# Patient Record
Sex: FEMALE | Race: WHITE | NOT HISPANIC OR LATINO | Employment: OTHER | URBAN - METROPOLITAN AREA
[De-identification: names, ages, dates, MRNs, and addresses within clinical notes are randomized per-mention and may not be internally consistent; named-entity substitution may affect disease eponyms.]

---

## 2017-01-23 ENCOUNTER — OFFICE VISIT - HEALTHEAST (OUTPATIENT)
Dept: FAMILY MEDICINE | Facility: CLINIC | Age: 72
End: 2017-01-23

## 2017-01-23 DIAGNOSIS — J20.9 ACUTE BRONCHITIS: ICD-10-CM

## 2017-01-23 DIAGNOSIS — E03.9 UNSPECIFIED HYPOTHYROIDISM: ICD-10-CM

## 2017-01-23 DIAGNOSIS — J01.90 ACUTE SINUSITIS: ICD-10-CM

## 2018-01-16 ENCOUNTER — HOSPITAL ENCOUNTER (OUTPATIENT)
Dept: MAMMOGRAPHY | Facility: HOSPITAL | Age: 73
Discharge: HOME | End: 2018-01-16
Attending: FAMILY MEDICINE

## 2018-01-16 DIAGNOSIS — Z12.31 VISIT FOR SCREENING MAMMOGRAM: ICD-10-CM

## 2018-01-29 ENCOUNTER — COMMUNICATION - HEALTHEAST (OUTPATIENT)
Dept: FAMILY MEDICINE | Facility: CLINIC | Age: 73
End: 2018-01-29

## 2018-01-29 DIAGNOSIS — E03.9 HYPOTHYROIDISM, UNSPECIFIED TYPE: ICD-10-CM

## 2018-01-29 DIAGNOSIS — Z00.00 HEALTH CARE MAINTENANCE: ICD-10-CM

## 2018-02-02 ENCOUNTER — AMBULATORY - HEALTHEAST (OUTPATIENT)
Dept: LAB | Facility: CLINIC | Age: 73
End: 2018-02-02

## 2018-02-02 DIAGNOSIS — Z00.00 HEALTH CARE MAINTENANCE: ICD-10-CM

## 2018-02-02 DIAGNOSIS — E03.9 HYPOTHYROIDISM, UNSPECIFIED TYPE: ICD-10-CM

## 2018-02-02 LAB
ALBUMIN SERPL-MCNC: 3.8 G/DL (ref 3.5–5)
ALP SERPL-CCNC: 67 U/L (ref 45–120)
ALT SERPL W P-5'-P-CCNC: 17 U/L (ref 0–45)
ANION GAP SERPL CALCULATED.3IONS-SCNC: 8 MMOL/L (ref 5–18)
AST SERPL W P-5'-P-CCNC: 21 U/L (ref 0–40)
BILIRUB SERPL-MCNC: 0.9 MG/DL (ref 0–1)
BUN SERPL-MCNC: 13 MG/DL (ref 8–28)
CALCIUM SERPL-MCNC: 10.9 MG/DL (ref 8.5–10.5)
CHLORIDE BLD-SCNC: 105 MMOL/L (ref 98–107)
CHOLEST SERPL-MCNC: 212 MG/DL
CO2 SERPL-SCNC: 30 MMOL/L (ref 22–31)
CREAT SERPL-MCNC: 0.7 MG/DL (ref 0.6–1.1)
FASTING STATUS PATIENT QL REPORTED: YES
GFR SERPL CREATININE-BSD FRML MDRD: >60 ML/MIN/1.73M2
GLUCOSE BLD-MCNC: 99 MG/DL (ref 70–125)
HDLC SERPL-MCNC: 64 MG/DL
LDLC SERPL CALC-MCNC: 129 MG/DL
POTASSIUM BLD-SCNC: 4.4 MMOL/L (ref 3.5–5)
PROT SERPL-MCNC: 7 G/DL (ref 6–8)
SODIUM SERPL-SCNC: 143 MMOL/L (ref 136–145)
T4 FREE SERPL-MCNC: 1.2 NG/DL (ref 0.7–1.8)
TRIGL SERPL-MCNC: 93 MG/DL
TSH SERPL DL<=0.005 MIU/L-ACNC: 6.45 UIU/ML (ref 0.3–5)

## 2018-02-05 ENCOUNTER — OFFICE VISIT - HEALTHEAST (OUTPATIENT)
Dept: FAMILY MEDICINE | Facility: CLINIC | Age: 73
End: 2018-02-05

## 2018-02-05 DIAGNOSIS — E03.9 HYPOTHYROIDISM: ICD-10-CM

## 2018-02-05 DIAGNOSIS — I10 HYPERTENSION: ICD-10-CM

## 2018-02-07 ENCOUNTER — AMBULATORY - HEALTHEAST (OUTPATIENT)
Dept: FAMILY MEDICINE | Facility: CLINIC | Age: 73
End: 2018-02-07

## 2018-02-07 DIAGNOSIS — E03.9 HYPOTHYROIDISM: ICD-10-CM

## 2018-02-26 ENCOUNTER — AMBULATORY - HEALTHEAST (OUTPATIENT)
Dept: FAMILY MEDICINE | Facility: CLINIC | Age: 73
End: 2018-02-26

## 2018-02-26 ENCOUNTER — COMMUNICATION - HEALTHEAST (OUTPATIENT)
Dept: FAMILY MEDICINE | Facility: CLINIC | Age: 73
End: 2018-02-26

## 2018-02-26 DIAGNOSIS — E03.9 HYPOTHYROIDISM, UNSPECIFIED TYPE: ICD-10-CM

## 2018-03-01 ENCOUNTER — AMBULATORY - HEALTHEAST (OUTPATIENT)
Dept: FAMILY MEDICINE | Facility: CLINIC | Age: 73
End: 2018-03-01

## 2018-03-19 ENCOUNTER — OFFICE VISIT - HEALTHEAST (OUTPATIENT)
Dept: FAMILY MEDICINE | Facility: CLINIC | Age: 73
End: 2018-03-19

## 2018-03-19 DIAGNOSIS — E03.9 HYPOTHYROIDISM, UNSPECIFIED TYPE: ICD-10-CM

## 2018-03-19 DIAGNOSIS — E78.5 HYPERLIPIDEMIA: ICD-10-CM

## 2018-03-19 DIAGNOSIS — I10 HYPERTENSION: ICD-10-CM

## 2018-03-19 LAB
T4 FREE SERPL-MCNC: 1 NG/DL (ref 0.7–1.8)
TSH SERPL DL<=0.005 MIU/L-ACNC: 5.22 UIU/ML (ref 0.3–5)

## 2018-05-03 ENCOUNTER — COMMUNICATION - HEALTHEAST (OUTPATIENT)
Dept: FAMILY MEDICINE | Facility: CLINIC | Age: 73
End: 2018-05-03

## 2018-05-03 DIAGNOSIS — I10 HYPERTENSION: ICD-10-CM

## 2018-05-11 ENCOUNTER — COMMUNICATION - HEALTHEAST (OUTPATIENT)
Dept: FAMILY MEDICINE | Facility: CLINIC | Age: 73
End: 2018-05-11

## 2018-05-17 ENCOUNTER — COMMUNICATION - HEALTHEAST (OUTPATIENT)
Dept: FAMILY MEDICINE | Facility: CLINIC | Age: 73
End: 2018-05-17

## 2018-10-15 ENCOUNTER — RECORDS - HEALTHEAST (OUTPATIENT)
Dept: ADMINISTRATIVE | Facility: OTHER | Age: 73
End: 2018-10-15

## 2019-01-07 ENCOUNTER — COMMUNICATION - HEALTHEAST (OUTPATIENT)
Dept: FAMILY MEDICINE | Facility: CLINIC | Age: 74
End: 2019-01-07

## 2019-01-16 ENCOUNTER — OFFICE VISIT - HEALTHEAST (OUTPATIENT)
Dept: FAMILY MEDICINE | Facility: CLINIC | Age: 74
End: 2019-01-16

## 2019-01-16 DIAGNOSIS — E03.9 HYPOTHYROIDISM, UNSPECIFIED TYPE: ICD-10-CM

## 2019-01-16 DIAGNOSIS — I10 ESSENTIAL HYPERTENSION: ICD-10-CM

## 2019-01-16 DIAGNOSIS — Z00.00 ROUTINE GENERAL MEDICAL EXAMINATION AT A HEALTH CARE FACILITY: ICD-10-CM

## 2019-01-16 LAB
ALBUMIN SERPL-MCNC: 3.9 G/DL (ref 3.5–5)
ALP SERPL-CCNC: 55 U/L (ref 45–120)
ALT SERPL W P-5'-P-CCNC: 16 U/L (ref 0–45)
ANION GAP SERPL CALCULATED.3IONS-SCNC: 11 MMOL/L (ref 5–18)
AST SERPL W P-5'-P-CCNC: 23 U/L (ref 0–40)
BILIRUB SERPL-MCNC: 0.8 MG/DL (ref 0–1)
BUN SERPL-MCNC: 18 MG/DL (ref 8–28)
CALCIUM SERPL-MCNC: 10.6 MG/DL (ref 8.5–10.5)
CHLORIDE BLD-SCNC: 103 MMOL/L (ref 98–107)
CO2 SERPL-SCNC: 27 MMOL/L (ref 22–31)
CREAT SERPL-MCNC: 0.69 MG/DL (ref 0.6–1.1)
ERYTHROCYTE [DISTWIDTH] IN BLOOD BY AUTOMATED COUNT: 12.3 % (ref 11–14.5)
GFR SERPL CREATININE-BSD FRML MDRD: >60 ML/MIN/1.73M2
GLUCOSE BLD-MCNC: 95 MG/DL (ref 70–125)
HCT VFR BLD AUTO: 43.9 % (ref 35–47)
HGB BLD-MCNC: 14.5 G/DL (ref 12–16)
MCH RBC QN AUTO: 30.6 PG (ref 27–34)
MCHC RBC AUTO-ENTMCNC: 33.1 G/DL (ref 32–36)
MCV RBC AUTO: 92 FL (ref 80–100)
PLATELET # BLD AUTO: 186 THOU/UL (ref 140–440)
PMV BLD AUTO: 9.2 FL (ref 7–10)
POTASSIUM BLD-SCNC: 4.2 MMOL/L (ref 3.5–5)
PROT SERPL-MCNC: 6.9 G/DL (ref 6–8)
RBC # BLD AUTO: 4.74 MILL/UL (ref 3.8–5.4)
SODIUM SERPL-SCNC: 141 MMOL/L (ref 136–145)
TSH SERPL DL<=0.005 MIU/L-ACNC: 3.76 UIU/ML (ref 0.3–5)
WBC: 6 THOU/UL (ref 4–11)

## 2019-01-16 ASSESSMENT — MIFFLIN-ST. JEOR: SCORE: 1304.62

## 2019-03-21 ENCOUNTER — COMMUNICATION - HEALTHEAST (OUTPATIENT)
Dept: FAMILY MEDICINE | Facility: CLINIC | Age: 74
End: 2019-03-21

## 2019-04-10 ENCOUNTER — AMBULATORY - HEALTHEAST (OUTPATIENT)
Dept: NURSING | Facility: CLINIC | Age: 74
End: 2019-04-10

## 2019-05-10 ENCOUNTER — COMMUNICATION - HEALTHEAST (OUTPATIENT)
Dept: FAMILY MEDICINE | Facility: CLINIC | Age: 74
End: 2019-05-10

## 2019-05-10 ENCOUNTER — COMMUNICATION - HEALTHEAST (OUTPATIENT)
Dept: SCHEDULING | Facility: CLINIC | Age: 74
End: 2019-05-10

## 2019-05-14 ENCOUNTER — OFFICE VISIT - HEALTHEAST (OUTPATIENT)
Dept: FAMILY MEDICINE | Facility: CLINIC | Age: 74
End: 2019-05-14

## 2019-05-14 DIAGNOSIS — M54.6 CHRONIC RIGHT-SIDED THORACIC BACK PAIN: ICD-10-CM

## 2019-05-14 DIAGNOSIS — G89.29 CHRONIC RIGHT-SIDED THORACIC BACK PAIN: ICD-10-CM

## 2019-05-28 ENCOUNTER — RECORDS - HEALTHEAST (OUTPATIENT)
Dept: GENERAL RADIOLOGY | Facility: CLINIC | Age: 74
End: 2019-05-28

## 2019-05-28 ENCOUNTER — OFFICE VISIT - HEALTHEAST (OUTPATIENT)
Dept: FAMILY MEDICINE | Facility: CLINIC | Age: 74
End: 2019-05-28

## 2019-05-28 DIAGNOSIS — Z78.0 POST-MENOPAUSAL: ICD-10-CM

## 2019-05-28 DIAGNOSIS — E03.9 HYPOTHYROIDISM, UNSPECIFIED TYPE: ICD-10-CM

## 2019-05-28 DIAGNOSIS — M54.6 RIGHT-SIDED THORACIC BACK PAIN, UNSPECIFIED CHRONICITY: ICD-10-CM

## 2019-05-28 DIAGNOSIS — M54.6 PAIN IN THORACIC SPINE: ICD-10-CM

## 2019-05-28 DIAGNOSIS — I10 ESSENTIAL HYPERTENSION: ICD-10-CM

## 2019-05-29 ENCOUNTER — AMBULATORY - HEALTHEAST (OUTPATIENT)
Dept: SCHEDULING | Facility: CLINIC | Age: 74
End: 2019-05-29

## 2019-05-29 DIAGNOSIS — Z78.0 POST-MENOPAUSAL: ICD-10-CM

## 2019-06-11 ENCOUNTER — OFFICE VISIT - HEALTHEAST (OUTPATIENT)
Dept: PHYSICAL THERAPY | Facility: REHABILITATION | Age: 74
End: 2019-06-11

## 2019-06-11 DIAGNOSIS — M54.6 PAIN IN THORACIC SPINE: ICD-10-CM

## 2019-06-11 DIAGNOSIS — M62.81 GENERALIZED MUSCLE WEAKNESS: ICD-10-CM

## 2019-06-11 DIAGNOSIS — R29.3 POOR POSTURE: ICD-10-CM

## 2019-06-13 ENCOUNTER — OFFICE VISIT - HEALTHEAST (OUTPATIENT)
Dept: PHYSICAL THERAPY | Facility: REHABILITATION | Age: 74
End: 2019-06-13

## 2019-06-13 DIAGNOSIS — M62.81 GENERALIZED MUSCLE WEAKNESS: ICD-10-CM

## 2019-06-13 DIAGNOSIS — M54.6 PAIN IN THORACIC SPINE: ICD-10-CM

## 2019-06-13 DIAGNOSIS — R29.3 POOR POSTURE: ICD-10-CM

## 2019-06-19 ENCOUNTER — OFFICE VISIT - HEALTHEAST (OUTPATIENT)
Dept: PHYSICAL THERAPY | Facility: REHABILITATION | Age: 74
End: 2019-06-19

## 2019-06-19 DIAGNOSIS — I10 ESSENTIAL HYPERTENSION: ICD-10-CM

## 2019-06-19 DIAGNOSIS — M62.81 GENERALIZED MUSCLE WEAKNESS: ICD-10-CM

## 2019-06-19 DIAGNOSIS — R29.3 POOR POSTURE: ICD-10-CM

## 2019-06-19 DIAGNOSIS — M54.6 PAIN IN THORACIC SPINE: ICD-10-CM

## 2019-06-26 ENCOUNTER — OFFICE VISIT - HEALTHEAST (OUTPATIENT)
Dept: PHYSICAL THERAPY | Facility: REHABILITATION | Age: 74
End: 2019-06-26

## 2019-06-26 DIAGNOSIS — M54.6 PAIN IN THORACIC SPINE: ICD-10-CM

## 2019-06-26 DIAGNOSIS — R29.3 POOR POSTURE: ICD-10-CM

## 2019-06-26 DIAGNOSIS — M62.81 GENERALIZED MUSCLE WEAKNESS: ICD-10-CM

## 2019-07-02 ENCOUNTER — OFFICE VISIT - HEALTHEAST (OUTPATIENT)
Dept: PHYSICAL THERAPY | Facility: REHABILITATION | Age: 74
End: 2019-07-02

## 2019-07-02 DIAGNOSIS — M54.6 PAIN IN THORACIC SPINE: ICD-10-CM

## 2019-07-02 DIAGNOSIS — R29.3 POOR POSTURE: ICD-10-CM

## 2019-07-02 DIAGNOSIS — M62.81 GENERALIZED MUSCLE WEAKNESS: ICD-10-CM

## 2019-07-10 ENCOUNTER — OFFICE VISIT - HEALTHEAST (OUTPATIENT)
Dept: PHYSICAL THERAPY | Facility: REHABILITATION | Age: 74
End: 2019-07-10

## 2019-07-10 DIAGNOSIS — M54.6 PAIN IN THORACIC SPINE: ICD-10-CM

## 2019-07-10 DIAGNOSIS — M62.81 GENERALIZED MUSCLE WEAKNESS: ICD-10-CM

## 2019-07-10 DIAGNOSIS — I10 ESSENTIAL HYPERTENSION: ICD-10-CM

## 2019-07-10 DIAGNOSIS — R29.3 POOR POSTURE: ICD-10-CM

## 2019-07-18 ENCOUNTER — OFFICE VISIT - HEALTHEAST (OUTPATIENT)
Dept: PHYSICAL THERAPY | Facility: REHABILITATION | Age: 74
End: 2019-07-18

## 2019-07-18 DIAGNOSIS — M62.81 GENERALIZED MUSCLE WEAKNESS: ICD-10-CM

## 2019-07-18 DIAGNOSIS — I10 ESSENTIAL HYPERTENSION: ICD-10-CM

## 2019-07-18 DIAGNOSIS — R29.3 POOR POSTURE: ICD-10-CM

## 2019-07-18 DIAGNOSIS — M54.6 PAIN IN THORACIC SPINE: ICD-10-CM

## 2019-07-24 ENCOUNTER — OFFICE VISIT - HEALTHEAST (OUTPATIENT)
Dept: PHYSICAL THERAPY | Facility: REHABILITATION | Age: 74
End: 2019-07-24

## 2019-07-24 DIAGNOSIS — M62.81 GENERALIZED MUSCLE WEAKNESS: ICD-10-CM

## 2019-07-24 DIAGNOSIS — R29.3 POOR POSTURE: ICD-10-CM

## 2019-07-24 DIAGNOSIS — M54.6 PAIN IN THORACIC SPINE: ICD-10-CM

## 2019-07-24 DIAGNOSIS — I10 ESSENTIAL HYPERTENSION: ICD-10-CM

## 2019-07-29 ENCOUNTER — OFFICE VISIT - HEALTHEAST (OUTPATIENT)
Dept: PHYSICAL THERAPY | Facility: REHABILITATION | Age: 74
End: 2019-07-29

## 2019-07-29 DIAGNOSIS — R29.3 POOR POSTURE: ICD-10-CM

## 2019-07-29 DIAGNOSIS — M54.6 PAIN IN THORACIC SPINE: ICD-10-CM

## 2019-07-29 DIAGNOSIS — M62.81 GENERALIZED MUSCLE WEAKNESS: ICD-10-CM

## 2019-08-07 ENCOUNTER — OFFICE VISIT - HEALTHEAST (OUTPATIENT)
Dept: PHYSICAL THERAPY | Facility: REHABILITATION | Age: 74
End: 2019-08-07

## 2019-08-07 DIAGNOSIS — M62.81 GENERALIZED MUSCLE WEAKNESS: ICD-10-CM

## 2019-08-07 DIAGNOSIS — M54.6 PAIN IN THORACIC SPINE: ICD-10-CM

## 2019-08-07 DIAGNOSIS — R29.3 POOR POSTURE: ICD-10-CM

## 2019-08-28 ENCOUNTER — OFFICE VISIT - HEALTHEAST (OUTPATIENT)
Dept: PHYSICAL THERAPY | Facility: REHABILITATION | Age: 74
End: 2019-08-28

## 2019-08-28 DIAGNOSIS — M54.6 PAIN IN THORACIC SPINE: ICD-10-CM

## 2019-08-28 DIAGNOSIS — M62.81 GENERALIZED MUSCLE WEAKNESS: ICD-10-CM

## 2019-08-28 DIAGNOSIS — R29.3 POOR POSTURE: ICD-10-CM

## 2019-08-29 ENCOUNTER — OFFICE VISIT - HEALTHEAST (OUTPATIENT)
Dept: FAMILY MEDICINE | Facility: CLINIC | Age: 74
End: 2019-08-29

## 2019-08-29 DIAGNOSIS — H26.9 CATARACT OF BOTH EYES, UNSPECIFIED CATARACT TYPE: ICD-10-CM

## 2019-08-29 DIAGNOSIS — M54.6 PAIN IN THORACIC SPINE: ICD-10-CM

## 2019-08-29 DIAGNOSIS — Z01.818 PREOP GENERAL PHYSICAL EXAM: ICD-10-CM

## 2019-08-29 ASSESSMENT — MIFFLIN-ST. JEOR: SCORE: 1338.64

## 2019-09-18 ENCOUNTER — OFFICE VISIT - HEALTHEAST (OUTPATIENT)
Dept: PHYSICAL THERAPY | Facility: REHABILITATION | Age: 74
End: 2019-09-18

## 2019-09-18 DIAGNOSIS — R29.3 POOR POSTURE: ICD-10-CM

## 2019-09-18 DIAGNOSIS — M54.6 PAIN IN THORACIC SPINE: ICD-10-CM

## 2019-09-18 DIAGNOSIS — M62.81 GENERALIZED MUSCLE WEAKNESS: ICD-10-CM

## 2019-10-09 ENCOUNTER — OFFICE VISIT - HEALTHEAST (OUTPATIENT)
Dept: PHYSICAL THERAPY | Facility: REHABILITATION | Age: 74
End: 2019-10-09

## 2019-10-09 DIAGNOSIS — R29.3 POOR POSTURE: ICD-10-CM

## 2019-10-09 DIAGNOSIS — M54.6 PAIN IN THORACIC SPINE: ICD-10-CM

## 2019-10-09 DIAGNOSIS — M62.81 GENERALIZED MUSCLE WEAKNESS: ICD-10-CM

## 2020-01-22 ENCOUNTER — COMMUNICATION - HEALTHEAST (OUTPATIENT)
Dept: FAMILY MEDICINE | Facility: CLINIC | Age: 75
End: 2020-01-22

## 2020-01-22 DIAGNOSIS — E03.9 HYPOTHYROIDISM, UNSPECIFIED TYPE: ICD-10-CM

## 2020-01-22 DIAGNOSIS — I10 ESSENTIAL HYPERTENSION: ICD-10-CM

## 2020-03-09 ENCOUNTER — OFFICE VISIT - HEALTHEAST (OUTPATIENT)
Dept: FAMILY MEDICINE | Facility: CLINIC | Age: 75
End: 2020-03-09

## 2020-03-09 DIAGNOSIS — E03.9 HYPOTHYROIDISM, UNSPECIFIED TYPE: ICD-10-CM

## 2020-03-09 DIAGNOSIS — E83.52 HYPERCALCEMIA: ICD-10-CM

## 2020-03-09 DIAGNOSIS — Z12.11 SCREEN FOR COLON CANCER: ICD-10-CM

## 2020-03-09 DIAGNOSIS — Z00.00 ROUTINE GENERAL MEDICAL EXAMINATION AT A HEALTH CARE FACILITY: ICD-10-CM

## 2020-03-09 DIAGNOSIS — I10 ESSENTIAL HYPERTENSION: ICD-10-CM

## 2020-03-09 LAB
ALBUMIN SERPL-MCNC: 4.2 G/DL (ref 3.5–5)
ALP SERPL-CCNC: 59 U/L (ref 45–120)
ALT SERPL W P-5'-P-CCNC: 20 U/L (ref 0–45)
ANION GAP SERPL CALCULATED.3IONS-SCNC: 9 MMOL/L (ref 5–18)
AST SERPL W P-5'-P-CCNC: 27 U/L (ref 0–40)
BILIRUB SERPL-MCNC: 0.8 MG/DL (ref 0–1)
BUN SERPL-MCNC: 14 MG/DL (ref 8–28)
CALCIUM SERPL-MCNC: 11.2 MG/DL (ref 8.5–10.5)
CHLORIDE BLD-SCNC: 102 MMOL/L (ref 98–107)
CHOLEST SERPL-MCNC: 220 MG/DL
CO2 SERPL-SCNC: 29 MMOL/L (ref 22–31)
CREAT SERPL-MCNC: 0.68 MG/DL (ref 0.6–1.1)
FASTING STATUS PATIENT QL REPORTED: YES
GFR SERPL CREATININE-BSD FRML MDRD: >60 ML/MIN/1.73M2
GLUCOSE BLD-MCNC: 97 MG/DL (ref 70–125)
HDLC SERPL-MCNC: 71 MG/DL
LDLC SERPL CALC-MCNC: 137 MG/DL
POTASSIUM BLD-SCNC: 4 MMOL/L (ref 3.5–5)
PROT SERPL-MCNC: 7.3 G/DL (ref 6–8)
PTH-INTACT SERPL-MCNC: 91 PG/ML (ref 10–86)
SODIUM SERPL-SCNC: 140 MMOL/L (ref 136–145)
TRIGL SERPL-MCNC: 58 MG/DL
TSH SERPL DL<=0.005 MIU/L-ACNC: 4.63 UIU/ML (ref 0.3–5)

## 2020-03-09 ASSESSMENT — ANXIETY QUESTIONNAIRES
2. NOT BEING ABLE TO STOP OR CONTROL WORRYING: NOT AT ALL
5. BEING SO RESTLESS THAT IT IS HARD TO SIT STILL: NOT AT ALL
1. FEELING NERVOUS, ANXIOUS, OR ON EDGE: NOT AT ALL
6. BECOMING EASILY ANNOYED OR IRRITABLE: NOT AT ALL
4. TROUBLE RELAXING: NOT AT ALL
GAD7 TOTAL SCORE: 0
7. FEELING AFRAID AS IF SOMETHING AWFUL MIGHT HAPPEN: NOT AT ALL
3. WORRYING TOO MUCH ABOUT DIFFERENT THINGS: NOT AT ALL

## 2020-03-09 ASSESSMENT — MIFFLIN-ST. JEOR: SCORE: 1318.82

## 2020-03-09 ASSESSMENT — PATIENT HEALTH QUESTIONNAIRE - PHQ9: SUM OF ALL RESPONSES TO PHQ QUESTIONS 1-9: 0

## 2020-03-10 LAB
25(OH)D3 SERPL-MCNC: 50.9 NG/ML (ref 30–80)
25(OH)D3 SERPL-MCNC: 50.9 NG/ML (ref 30–80)

## 2020-03-12 ENCOUNTER — COMMUNICATION - HEALTHEAST (OUTPATIENT)
Dept: ADMINISTRATIVE | Facility: CLINIC | Age: 75
End: 2020-03-12

## 2020-03-19 ENCOUNTER — TRANSCRIBE ORDERS (OUTPATIENT)
Dept: OTHER | Age: 75
End: 2020-03-19

## 2020-03-19 DIAGNOSIS — E83.52 HYPERCALCEMIA: Primary | ICD-10-CM

## 2020-03-20 ENCOUNTER — TELEPHONE (OUTPATIENT)
Dept: ENDOCRINOLOGY | Facility: CLINIC | Age: 75
End: 2020-03-20

## 2020-03-20 NOTE — TELEPHONE ENCOUNTER
To schedulers: Please schedule  for new endocrine virtual visit within 28 days.     Kami Chang MD  Endocrine triage

## 2020-03-27 NOTE — TELEPHONE ENCOUNTER
Scheduled pt as telephone visit 3/30. Informed patient that it may change to virtual visit if our capabilities are up and running by then.

## 2020-03-30 PROBLEM — E83.52 HYPERCALCEMIA: Status: ACTIVE | Noted: 2020-03-30

## 2020-04-01 ENCOUNTER — COMMUNICATION - HEALTHEAST (OUTPATIENT)
Dept: FAMILY MEDICINE | Facility: CLINIC | Age: 75
End: 2020-04-01

## 2020-04-20 ENCOUNTER — COMMUNICATION - HEALTHEAST (OUTPATIENT)
Dept: FAMILY MEDICINE | Facility: CLINIC | Age: 75
End: 2020-04-20

## 2020-04-20 DIAGNOSIS — E03.9 HYPOTHYROIDISM, UNSPECIFIED TYPE: ICD-10-CM

## 2020-07-13 ENCOUNTER — COMMUNICATION - HEALTHEAST (OUTPATIENT)
Dept: FAMILY MEDICINE | Facility: CLINIC | Age: 75
End: 2020-07-13

## 2020-07-13 DIAGNOSIS — E03.9 HYPOTHYROIDISM, UNSPECIFIED TYPE: ICD-10-CM

## 2020-09-13 ENCOUNTER — COMMUNICATION - HEALTHEAST (OUTPATIENT)
Dept: FAMILY MEDICINE | Facility: CLINIC | Age: 75
End: 2020-09-13

## 2020-09-21 ENCOUNTER — OFFICE VISIT - HEALTHEAST (OUTPATIENT)
Dept: FAMILY MEDICINE | Facility: CLINIC | Age: 75
End: 2020-09-21

## 2020-09-21 DIAGNOSIS — I10 ESSENTIAL HYPERTENSION: ICD-10-CM

## 2020-09-21 DIAGNOSIS — E03.9 HYPOTHYROIDISM, UNSPECIFIED TYPE: ICD-10-CM

## 2020-09-21 DIAGNOSIS — Z23 NEED FOR INFLUENZA VACCINATION: ICD-10-CM

## 2020-09-21 DIAGNOSIS — E78.2 MIXED HYPERLIPIDEMIA: ICD-10-CM

## 2020-09-21 DIAGNOSIS — E83.52 HYPERCALCEMIA: ICD-10-CM

## 2020-10-05 LAB
25(OH)D3 SERPL-MCNC: 44.6 NG/ML (ref 30–80)
25(OH)D3 SERPL-MCNC: 44.6 NG/ML (ref 30–80)
ANION GAP SERPL CALCULATED.3IONS-SCNC: 10 MMOL/L (ref 5–18)
BUN SERPL-MCNC: 10 MG/DL (ref 8–28)
CALCIUM SERPL-MCNC: 10.6 MG/DL (ref 8.5–10.5)
CHLORIDE BLD-SCNC: 104 MMOL/L (ref 98–107)
CO2 SERPL-SCNC: 25 MMOL/L (ref 22–31)
CREAT SERPL-MCNC: 0.74 MG/DL (ref 0.6–1.1)
GFR SERPL CREATININE-BSD FRML MDRD: >60 ML/MIN/1.73M2
GLUCOSE BLD-MCNC: 101 MG/DL (ref 70–125)
POTASSIUM BLD-SCNC: 4 MMOL/L (ref 3.5–5)
PTH-INTACT SERPL-MCNC: 110 PG/ML (ref 10–86)
SODIUM SERPL-SCNC: 139 MMOL/L (ref 136–145)

## 2020-10-28 ENCOUNTER — DOCUMENTATION ONLY (OUTPATIENT)
Dept: OTHER | Facility: CLINIC | Age: 75
End: 2020-10-28

## 2020-10-28 ENCOUNTER — AMBULATORY - HEALTHEAST (OUTPATIENT)
Dept: OTHER | Facility: CLINIC | Age: 75
End: 2020-10-28

## 2020-11-03 RX ORDER — UBIDECARENONE 100 MG
100 CAPSULE ORAL
COMMUNITY

## 2020-11-03 RX ORDER — LEVOTHYROXINE SODIUM 25 UG/1
25 TABLET ORAL
COMMUNITY
Start: 2020-09-21 | End: 2022-04-04

## 2020-11-03 RX ORDER — HYDROCHLOROTHIAZIDE 12.5 MG/1
12.5 TABLET ORAL
COMMUNITY
Start: 2020-09-21 | End: 2022-04-04

## 2020-11-03 RX ORDER — HYDROCORTISONE 2.5 %
CREAM (GRAM) TOPICAL
COMMUNITY
Start: 2019-05-08 | End: 2023-01-04

## 2020-11-03 RX ORDER — VITAMIN B COMPLEX
1 CAPSULE ORAL EVERY OTHER DAY
COMMUNITY

## 2020-11-03 RX ORDER — CHLORAL HYDRATE 500 MG
2 CAPSULE ORAL
COMMUNITY

## 2020-11-03 RX ORDER — PRAVASTATIN SODIUM 20 MG
20 TABLET ORAL
COMMUNITY
Start: 2020-09-21 | End: 2021-09-03

## 2020-11-03 RX ORDER — LANOLIN ALCOHOL/MO/W.PET/CERES
500 CREAM (GRAM) TOPICAL
COMMUNITY

## 2020-11-03 NOTE — PROGRESS NOTES
"Kat Diego is a 75 year old female who is being evaluated via a billable video visit.      The patient has been notified of following:     \"This video visit will be conducted via a call between you and your physician/provider. We have found that certain health care needs can be provided without the need for an in-person physical exam.  This service lets us provide the care you need with a video conversation.  If a prescription is necessary we can send it directly to your pharmacy.  If lab work is needed we can place an order for that and you can then stop by our lab to have the test done at a later time.    Video visits are billed at different rates depending on your insurance coverage.  Please reach out to your insurance provider with any questions.    If during the course of the call the physician/provider feels a video visit is not appropriate, you will not be charged for this service.\"    Patient has given verbal consent for Video visit? Yes  How would you like to obtain your AVS? Mail a copy  If you are dropped from the video visit, the video invite should be resent to: Text to cell phone: [phone]  Will anyone else be joining your video visit? No        Video-Visit Details    Type of service:  Video Visit    Video Start Time: 12:00 pm  Video End Time: 12:25 pm    Originating Location (pt. Location): Home    Distant Location (provider location):  [facility] ENDOCRINOLOG[facility] [place]     Platform used for Video Visit: [employer]    Benigno Camejo MD      Endocrinology Clinic Visit 11/04/20  NAME:  Kat Diego  PCP:  No primary care provider on file.  MRN:  [id number]  Reason for Consult:  HYpercalcemia  Requesting Provider:  Referred Self    Chief Complaint     Follow up. Hypercalcemia.     History of Present Illness     Kat Diego is a 75 year old female who is seen via video visit for management of hypercalcemia.      I initially evaluated her in March 2020. Briefly: " Hypercalcemia was first noted in September 2016.  Total calcium then was 11 mg/dL.  PTH check then was 60, which was in the normal range. Subsequent calcium levels checked since then have been elevated, at 10.9 in 2018, and 10.6 in 2019.  And 11.2 on 3/9/2020 and it was elevated at 11.2 mg/dL.  Reference range is 8.5-10.5. PTH was checked on 3/9/2020 and it was elevated at 91 pg/mL, with reference range of 10-86.  Vitamin D level was 50.9 ng/mL, other labs at the same date showed normal TSH of 4.6, normal creatinine and normal albumin.  DXA done on 6/11/2019 showed osteopenia with lowest T score of -1.2 at the left femoral neck.    Interval History:   Last visit March 2020 I asked her to discontinue calcium supplementation she was consuming 5 dairy servings/day).   She continued vitamin D 2000 international unit(s) daily, and hydrochlorothiazide.     Labs checked 9/21/2020: Vitamin D: 44.6, , Calcium 10.6.     Symptoms of hypercalcemia: No GERD, has constipation for which she takes psyllium (chronic long standing), no dyspepsia, no mental status changes/lethargy, some more polyuria. Drinks 1 gallon of water a day.      Other risk factors reviewed in initial visit:   - Previous fractures: No  - Family history of fragility fracture in parent: No  - History of kidney stones: No  - History of kidney disease: No  - Family history of any calcium problems or kidney stones: No  - History of vitamin D deficiency: No  - History of HCTZ use: Yes, since 2016  - History of Lithium use: No  - History of malabsorption (IBD, Celiac, gastric bypass ): No  - History of thyroid disease: Yes: on LT4 25 mcg  - Weight history: stable    Problem List     Patient Active Problem List   Diagnosis     Hypercalcemia        Medications     Reviewed from care everywhere, specifically [facility] records and reconciled.    Allergies       Reviewed from care everywhere, specifically [facility] records and reconciled.      Medical / Surgical  History       Reviewed from care everywhere, specifically [facility] records and reconciled.    Social History     Social History     Socioeconomic History     Marital status: Not on file     Spouse name: Not on file     Number of children: Not on file     Years of education: Not on file     Highest education level: Not on file   Occupational History     Not on file   Social Needs     Financial resource strain: Not on file     Food insecurity     Worry: Not on file     Inability: Not on file     Transportation needs     Medical: Not on file     Non-medical: Not on file   Tobacco Use     Smoking status: Not on file   Substance and Sexual Activity     Alcohol use: Not on file     Drug use: Not on file     Sexual activity: Not on file   Lifestyle     Physical activity     Days per week: Not on file     Minutes per session: Not on file     Stress: Not on file   Relationships     Social connections     Talks on phone: Not on file     Gets together: Not on file     Attends Holiness service: Not on file     Active member of club or organization: Not on file     Attends meetings of clubs or organizations: Not on file     Relationship status: Not on file     Intimate partner violence     Fear of current or ex partner: Not on file     Emotionally abused: Not on file     Physically abused: Not on file     Forced sexual activity: Not on file   Other Topics Concern     Not on file   Social History Narrative     Not on file       Family History     No family history on file.    ROS     Constitutional: no fevers, chills, night sweats. No weight loss/gain. No fatigue. Good appetite  Eyes: no vision changes, no eye redness, no diplopia  Ears, Nose, mouth, throat: no hearing changes, no tinnitus, no rhinorrhea, no nasal congestion, no anosmia  Cardiovascular: no chest pain, no orthopnea or PND, no edema, no palpitations  Respiratory: no dyspnea, no cough, no sputum, no wheezing  Gastrointestinal: no nausea, no vomiting, no abdominal  pain, no diarrhea, no constipation  Genitourinary: no dysuria, no frequency, no urgency, no nocturia  Musculoskeletal: no joint pains, no back pain, no cramps, no fractures  Skin: no rash, no itching, no dryness, no ulcers, no hair loss, no nail changes  Neurological:no headaches, no weakness, no numbness, no tingling, no tremors, no difficulty sleeping, no snoring, no AM sleepiness  Psychiatric: no anxiety, no sadness  Hematologic/lymphatic: no easy bruising, no bleeding, no palor    Physical Exam   There were no vitals taken for this visit.   GENERAL: Healthy, alert and no distress  EYES: Eyes grossly normal to inspection.  No discharge or erythema, or obvious scleral/conjunctival abnormalities.  RESP: No audible wheeze, cough, or visible cyanosis.  No visible retractions or increased work of breathing.    SKIN: Visible skin clear. No significant rash, abnormal pigmentation or lesions.  NEURO: Cranial nerves grossly intact.  Mentation and speech appropriate for age.  PSYCH: Mentation appears normal, affect normal/bright, judgement and insight intact, normal speech and appearance well-groomed.      Labs/Imaging     Pertinent Labs were reviewed and updated in Epic HPI above.   Radiology Results were  reviewed and updated in EPIC.    I personally reviewed the patient's outside records from Care Everywhere. Summary of pertinent findings in HPI.    Impression / Plan     1. Hypercalcemia.   With elevated PTH, the most likely diagnosis is primary hyperparathyroidism.     The latest NIH guidelines conclude that surgery for hyperparathyroidism is indicated in symptomatic patients, or in asymptomatic patients who meet any one of the following conditions:  Serum calcium concentration of 1.0 mg/dL (0.25 mmol/L) or more above the upper limit of normal   Creatinine clearance that is reduced to <60 mL/min   Bone density at the hip, lumbar spine, or distal radius that is more than 2.5 standard deviations below peak bone mass (T  score <-2.5) and/or previous fragility fracture   Age less than 50 years  Operative intervention can be delayed in patients over 50 years of age who are asymptomatic or minimally symptomatic and who have serum calcium concentrations <1.0 mg/dL (0.2 mmol/L) above the upper limit of normal, and in patients who are medically unfit for surgery.    Patient is electing to observe for now.     If surgery is not to be performed, we will defer 24-hour urine collection, especially since the patient is on hydrochlorothiazide.  If at some point in the future, she wants surgery, we will stop hydrochlorothiazide and confirm with a 24 hr urine collection that she does not have FHH.       In the meantime for now:   - reduce vitamin D intake to 1000 international unit(s) daily  - continue hydrochlorothiazide since her BP is good and the serum calcium is not very elevated.       Follow up: April 2021, after she completes annual labs with PCP.     Benigno Camejo MD  Endocrinology, Diabetes and Metabolism  [facility]

## 2020-11-04 ENCOUNTER — VIRTUAL VISIT (OUTPATIENT)
Dept: ENDOCRINOLOGY | Facility: CLINIC | Age: 75
End: 2020-11-04
Payer: COMMERCIAL

## 2020-11-04 DIAGNOSIS — E83.52 HYPERCALCEMIA: Primary | ICD-10-CM

## 2020-11-04 DIAGNOSIS — R79.89 ELEVATED PARATHYROID HORMONE: ICD-10-CM

## 2020-11-04 PROCEDURE — 99214 OFFICE O/P EST MOD 30 MIN: CPT | Mod: 95 | Performed by: INTERNAL MEDICINE

## 2020-11-04 NOTE — LETTER
"11/4/2020       RE: Kat Diego  [address]  [facility] [address]     Dear Colleague,    Thank you for referring your patient, Kat Diego, to the [facility]OCRINOLOGY [facility] [place] at [facility]. Please see a copy of my visit note below.    Kat Diego is a 75 year old female who is being evaluated via a billable video visit.      The patient has been notified of following:     \"This video visit will be conducted via a call between you and your physician/provider. We have found that certain health care needs can be provided without the need for an in-person physical exam.  This service lets us provide the care you need with a video conversation.  If a prescription is necessary we can send it directly to your pharmacy.  If lab work is needed we can place an order for that and you can then stop by our lab to have the test done at a later time.    Video visits are billed at different rates depending on your insurance coverage.  Please reach out to your insurance provider with any questions.    If during the course of the call the physician/provider feels a video visit is not appropriate, you will not be charged for this service.\"    Patient has given verbal consent for Video visit? Yes  How would you like to obtain your AVS? Mail a copy  If you are dropped from the video visit, the video invite should be resent to: Text to cell phone: [phone]  Will anyone else be joining your video visit? No        Video-Visit Details    Type of service:  Video Visit    Video Start Time: 12:00 pm  Video End Time: 12:25 pm    Originating Location (pt. Location): Home    Distant Location (provider location):  [facility]CRINOLOG[facility] [place]     Platform used for Video Visit: Caleb Camejo MD      Endocrinology Clinic Visit 11/04/20  NAME:  Kat Diego  PCP:  No primary care provider on file.  MRN:  [id number]  Reason " for Consult:  HYpercalcemia  Requesting Provider:  Referred Self    Chief Complaint     Follow up. Hypercalcemia.     History of Present Illness     Kat Diego is a 75 year old female who is seen via video visit for management of hypercalcemia.      I initially evaluated her in March 2020. Briefly: Hypercalcemia was first noted in September 2016.  Total calcium then was 11 mg/dL.  PTH check then was 60, which was in the normal range. Subsequent calcium levels checked since then have been elevated, at 10.9 in 2018, and 10.6 in 2019.  And 11.2 on 3/9/2020 and it was elevated at 11.2 mg/dL.  Reference range is 8.5-10.5. PTH was checked on 3/9/2020 and it was elevated at 91 pg/mL, with reference range of 10-86.  Vitamin D level was 50.9 ng/mL, other labs at the same date showed normal TSH of 4.6, normal creatinine and normal albumin.  DXA done on 6/11/2019 showed osteopenia with lowest T score of -1.2 at the left femoral neck.    Interval History:   Last visit March 2020 I asked her to discontinue calcium supplementation she was consuming 5 dairy servings/day).   She continued vitamin D 2000 international unit(s) daily, and hydrochlorothiazide.     Labs checked 9/21/2020: Vitamin D: 44.6, , Calcium 10.6.     Symptoms of hypercalcemia: No GERD, has constipation for which she takes psyllium (chronic long standing), no dyspepsia, no mental status changes/lethargy, some more polyuria. Drinks 1 gallon of water a day.      Other risk factors reviewed in initial visit:   - Previous fractures: No  - Family history of fragility fracture in parent: No  - History of kidney stones: No  - History of kidney disease: No  - Family history of any calcium problems or kidney stones: No  - History of vitamin D deficiency: No  - History of HCTZ use: Yes, since 2016  - History of Lithium use: No  - History of malabsorption (IBD, Celiac, gastric bypass ): No  - History of thyroid disease: Yes: on LT4 25 mcg  - Weight history:  stable    Problem List     Patient Active Problem List   Diagnosis     Hypercalcemia        Medications     Reviewed from care everywhere, specifically [facility] records and reconciled.    Allergies       Reviewed from care everywhere, specifically [facility] records and reconciled.      Medical / Surgical History       Reviewed from care everywhere, specifically [facility] records and reconciled.    Social History     Social History     Socioeconomic History     Marital status: Not on file     Spouse name: Not on file     Number of children: Not on file     Years of education: Not on file     Highest education level: Not on file   Occupational History     Not on file   Social Needs     Financial resource strain: Not on file     Food insecurity     Worry: Not on file     Inability: Not on file     Transportation needs     Medical: Not on file     Non-medical: Not on file   Tobacco Use     Smoking status: Not on file   Substance and Sexual Activity     Alcohol use: Not on file     Drug use: Not on file     Sexual activity: Not on file   Lifestyle     Physical activity     Days per week: Not on file     Minutes per session: Not on file     Stress: Not on file   Relationships     Social connections     Talks on phone: Not on file     Gets together: Not on file     Attends Nondenominational service: Not on file     Active member of club or organization: Not on file     Attends meetings of clubs or organizations: Not on file     Relationship status: Not on file     Intimate partner violence     Fear of current or ex partner: Not on file     Emotionally abused: Not on file     Physically abused: Not on file     Forced sexual activity: Not on file   Other Topics Concern     Not on file   Social History Narrative     Not on file       Family History     No family history on file.    ROS     Constitutional: no fevers, chills, night sweats. No weight loss/gain. No fatigue. Good appetite  Eyes: no vision changes, no eye redness, no  diplopia  Ears, Nose, mouth, throat: no hearing changes, no tinnitus, no rhinorrhea, no nasal congestion, no anosmia  Cardiovascular: no chest pain, no orthopnea or PND, no edema, no palpitations  Respiratory: no dyspnea, no cough, no sputum, no wheezing  Gastrointestinal: no nausea, no vomiting, no abdominal pain, no diarrhea, no constipation  Genitourinary: no dysuria, no frequency, no urgency, no nocturia  Musculoskeletal: no joint pains, no back pain, no cramps, no fractures  Skin: no rash, no itching, no dryness, no ulcers, no hair loss, no nail changes  Neurological:no headaches, no weakness, no numbness, no tingling, no tremors, no difficulty sleeping, no snoring, no AM sleepiness  Psychiatric: no anxiety, no sadness  Hematologic/lymphatic: no easy bruising, no bleeding, no palor    Physical Exam   There were no vitals taken for this visit.   GENERAL: Healthy, alert and no distress  EYES: Eyes grossly normal to inspection.  No discharge or erythema, or obvious scleral/conjunctival abnormalities.  RESP: No audible wheeze, cough, or visible cyanosis.  No visible retractions or increased work of breathing.    SKIN: Visible skin clear. No significant rash, abnormal pigmentation or lesions.  NEURO: Cranial nerves grossly intact.  Mentation and speech appropriate for age.  PSYCH: Mentation appears normal, affect normal/bright, judgement and insight intact, normal speech and appearance well-groomed.      Labs/Imaging     Pertinent Labs were reviewed and updated in Epic HPI above.   Radiology Results were  reviewed and updated in EPIC.    I personally reviewed the patient's outside records from [facility]. Summary of pertinent findings in HPI.    Impression / Plan     1. Hypercalcemia.   With elevated PTH, the most likely diagnosis is primary hyperparathyroidism.     The latest NIH guidelines conclude that surgery for hyperparathyroidism is indicated in symptomatic patients, or in asymptomatic patients who meet  any one of the following conditions:  Serum calcium concentration of 1.0 mg/dL (0.25 mmol/L) or more above the upper limit of normal   Creatinine clearance that is reduced to <60 mL/min   Bone density at the hip, lumbar spine, or distal radius that is more than 2.5 standard deviations below peak bone mass (T score <-2.5) and/or previous fragility fracture   Age less than 50 years  Operative intervention can be delayed in patients over 50 years of age who are asymptomatic or minimally symptomatic and who have serum calcium concentrations <1.0 mg/dL (0.2 mmol/L) above the upper limit of normal, and in patients who are medically unfit for surgery.    Patient is electing to observe for now.     If surgery is not to be performed, we will defer 24-hour urine collection, especially since the patient is on hydrochlorothiazide.  If at some point in the future, she wants surgery, we will stop hydrochlorothiazide and confirm with a 24 hr urine collection that she does not have FHH.       In the meantime for now:   - reduce vitamin D intake to 1000 international unit(s) daily  - continue hydrochlorothiazide since her BP is good and the serum calcium is not very elevated.       Follow up: April 2021, after she completes annual labs with PCP.     Benigno Camejo MD  Endocrinology, Diabetes and Metabolism  [facility]

## 2021-01-04 ENCOUNTER — HEALTH MAINTENANCE LETTER (OUTPATIENT)
Age: 76
End: 2021-01-04

## 2021-01-05 ENCOUNTER — COMMUNICATION - HEALTHEAST (OUTPATIENT)
Dept: FAMILY MEDICINE | Facility: CLINIC | Age: 76
End: 2021-01-05

## 2021-02-03 ENCOUNTER — COMMUNICATION - HEALTHEAST (OUTPATIENT)
Dept: FAMILY MEDICINE | Facility: CLINIC | Age: 76
End: 2021-02-03

## 2021-02-19 ENCOUNTER — AMBULATORY - HEALTHEAST (OUTPATIENT)
Dept: NURSING | Facility: CLINIC | Age: 76
End: 2021-02-19

## 2021-03-12 ENCOUNTER — AMBULATORY - HEALTHEAST (OUTPATIENT)
Dept: NURSING | Facility: CLINIC | Age: 76
End: 2021-03-12

## 2021-03-29 ENCOUNTER — OFFICE VISIT - HEALTHEAST (OUTPATIENT)
Dept: FAMILY MEDICINE | Facility: CLINIC | Age: 76
End: 2021-03-29

## 2021-03-29 ENCOUNTER — AMBULATORY - HEALTHEAST (OUTPATIENT)
Dept: FAMILY MEDICINE | Facility: CLINIC | Age: 76
End: 2021-03-29

## 2021-03-29 DIAGNOSIS — E78.2 MIXED HYPERLIPIDEMIA: ICD-10-CM

## 2021-03-29 DIAGNOSIS — R63.5 WEIGHT GAIN: ICD-10-CM

## 2021-03-29 DIAGNOSIS — I10 ESSENTIAL HYPERTENSION: ICD-10-CM

## 2021-03-29 DIAGNOSIS — E03.9 HYPOTHYROIDISM, UNSPECIFIED TYPE: ICD-10-CM

## 2021-03-29 DIAGNOSIS — Z00.00 ENCOUNTER FOR ANNUAL WELLNESS EXAM IN MEDICARE PATIENT: ICD-10-CM

## 2021-03-29 DIAGNOSIS — E83.52 HYPERCALCEMIA: ICD-10-CM

## 2021-03-29 LAB
ALBUMIN SERPL-MCNC: 3.9 G/DL (ref 3.5–5)
ALP SERPL-CCNC: 59 U/L (ref 45–120)
ALT SERPL W P-5'-P-CCNC: 26 U/L (ref 0–45)
ANION GAP SERPL CALCULATED.3IONS-SCNC: 11 MMOL/L (ref 5–18)
AST SERPL W P-5'-P-CCNC: 29 U/L (ref 0–40)
BILIRUB SERPL-MCNC: 0.8 MG/DL (ref 0–1)
BUN SERPL-MCNC: 10 MG/DL (ref 8–28)
CALCIUM SERPL-MCNC: 10.2 MG/DL (ref 8.5–10.5)
CHLORIDE BLD-SCNC: 104 MMOL/L (ref 98–107)
CHOLEST SERPL-MCNC: 169 MG/DL
CO2 SERPL-SCNC: 26 MMOL/L (ref 22–31)
CREAT SERPL-MCNC: 0.72 MG/DL (ref 0.6–1.1)
ERYTHROCYTE [DISTWIDTH] IN BLOOD BY AUTOMATED COUNT: 13.5 % (ref 11–14.5)
FASTING STATUS PATIENT QL REPORTED: YES
GFR SERPL CREATININE-BSD FRML MDRD: >60 ML/MIN/1.73M2
GLUCOSE BLD-MCNC: 102 MG/DL (ref 70–125)
HCT VFR BLD AUTO: 44.2 % (ref 35–47)
HCV AB SERPL QL IA: NEGATIVE
HDLC SERPL-MCNC: 63 MG/DL
HGB BLD-MCNC: 14.7 G/DL (ref 12–16)
LDLC SERPL CALC-MCNC: 92 MG/DL
MCH RBC QN AUTO: 30.4 PG (ref 27–34)
MCHC RBC AUTO-ENTMCNC: 33.3 G/DL (ref 32–36)
MCV RBC AUTO: 92 FL (ref 80–100)
PLATELET # BLD AUTO: 200 THOU/UL (ref 140–440)
PMV BLD AUTO: 11.3 FL (ref 7–10)
POTASSIUM BLD-SCNC: 4.2 MMOL/L (ref 3.5–5)
PROT SERPL-MCNC: 6.7 G/DL (ref 6–8)
PTH-INTACT SERPL-MCNC: 123 PG/ML (ref 10–86)
RBC # BLD AUTO: 4.83 MILL/UL (ref 3.8–5.4)
SODIUM SERPL-SCNC: 141 MMOL/L (ref 136–145)
T4 FREE SERPL-MCNC: 1.1 NG/DL (ref 0.7–1.8)
TRIGL SERPL-MCNC: 70 MG/DL
TSH SERPL DL<=0.005 MIU/L-ACNC: 5.89 UIU/ML (ref 0.3–5)
WBC: 5.7 THOU/UL (ref 4–11)

## 2021-03-29 ASSESSMENT — PATIENT HEALTH QUESTIONNAIRE - PHQ9: SUM OF ALL RESPONSES TO PHQ QUESTIONS 1-9: 7

## 2021-03-29 ASSESSMENT — MIFFLIN-ST. JEOR: SCORE: 1364.15

## 2021-04-12 RX ORDER — MULTIVIT WITH MINERALS/LUTEIN
1 TABLET ORAL DAILY
COMMUNITY
End: 2022-11-07

## 2021-04-12 NOTE — PROGRESS NOTES
Kat Diego  is being evaluated via a billable video visit.      How would you like to obtain your AVS? [employer]  For the video visit, send the invitation by: Text to cell phone: [phone]  Will anyone else be joining your video visit? No        Video Start Time: 12:30    Assessment & Plan     1. Hypercalcemia.   With elevated PTH, the most likely diagnosis is primary hyperparathyroidism.     As per the full explanation from last visits, and with no major changes in her labs or clinical condition to warrant surgery, Patient is electing to observe for now.     In the meantime:  - continue calcium in diet and MVI only. No extra supplements.   - continue vitamin D 1000 international unit(s) daily  - continue hydrochlorothiazide since her BP is good        2. Acquired Hypothyroidism  She is on 25 mcg of levothyroxine. TSH 5.8. c/o hair thinning. Consider increasing the dose slightly by taking 25 mcg alternating with 50 mcg every other day. Patient wished to have her PCP take a look at this and make the final decisions.       Review of the result(s) of each unique test - labs done at Care everywhere 3/29/2021, [facility] and documented in [facility] below     30 minutes spent on the date of the encounter doing chart review, history and exam, documentation and further activities per the note       Follow up in 6 months.     Benigno Camejo MD  [facility] ENDOCRINOLOGY [facility] [place]    ----------------------------------------------------------------------    Subjective   Kat is a 75 year old who presents for the following health issues: hypercalcemia.     HPI     I initially evaluated her in March 2020. Briefly: Hypercalcemia was first noted in September 2016.  Total calcium then was 11 mg/dL.  PTH check then was 60, which was in the normal range. Subsequent calcium levels checked since then have been elevated, at 10.9 in 2018, and 10.6 in 2019.  And 11.2 on 3/9/2020. PTH on 3/9/2020 was elevated at 91  pg/mL, with reference range of 10-86.  Vitamin D level was 50.9 ng/mL, other labs at the same date showed normal TSH of 4.6, normal creatinine and normal albumin.     DXA done on 6/11/2019 showed osteopenia with lowest T score of -1.2 at the left femoral neck.    March 2020 I asked her to discontinue calcium supplementation she was consuming 5 dairy servings/day). She continued vitamin D 2000 international unit(s) daily, and hydrochlorothiazide.   Labs 9/21/2020: Vitamin D: 44.6, , Calcium 10.6.     Interval History:   Last visit November 2020. I asked her to reduce vitamin D to 1000 international unit(s) daily and continue hydrochlorothiazide as long as she is not symptomatic from hypercalcemia.     Labs 3/29/2021: Vitamin D not checked. , Calcium, 10.2 (normal 8.5-10.5).     Symptoms of hypercalcemia: No GERD, has constipation for which she takes psyllium (chronic long standing), no dyspepsia, no mental status changes/lethargy, some more polyuria. Drinks 1 gallon of water a day.  Reports some more Hair thinning  Good energy level  Went back to the gym  Taking hydrochlorothiazide for HTN.     For hypothyroidism, she takes Levothyroxine 25 mcg.   TSH 5.89, just checked March 2021.     Other risk factors reviewed in initial visit:   - Previous fractures: No  - Family history of fragility fracture in parent: No  - History of kidney stones: No  - History of kidney disease: No  - Family history of any calcium problems or kidney stones: No  - History of vitamin D deficiency: No  - History of HCTZ use: Yes, since 2016  - History of Lithium use: No  - History of malabsorption (IBD, Celiac, gastric bypass ): No  - History of thyroid disease: Yes: on LT4 25 mcg  - Weight history: stable      Review of Systems   Constitutional, HEENT, cardiovascular, pulmonary, gi and gu systems are negative, except as otherwise noted.      Objective           Vitals:  No vitals were obtained today due to virtual  visit.    Physical Exam   GENERAL: Healthy, alert and no distress  EYES: Eyes grossly normal to inspection.  No discharge or erythema, or obvious scleral/conjunctival abnormalities.  RESP: No audible wheeze, cough, or visible cyanosis.  No visible retractions or increased work of breathing.    SKIN: Visible skin clear. No significant rash, abnormal pigmentation or lesions.  NEURO: Cranial nerves grossly intact.  Mentation and speech appropriate for age.  PSYCH: Mentation appears normal, affect normal/bright, judgement and insight intact, normal speech and appearance well-groomed.          Video-Visit Details    Type of service:  Video Visit    Video End Time:12:55    Originating Location (pt. Location): Home    Distant Location (provider location):  [facility] ENDOCRINOLOGY [facility]IN[facility] [place]     Platform used for Video Visit: [employer]

## 2021-04-13 ENCOUNTER — RECORDS - HEALTHEAST (OUTPATIENT)
Dept: ADMINISTRATIVE | Facility: OTHER | Age: 76
End: 2021-04-13

## 2021-04-13 ENCOUNTER — VIRTUAL VISIT (OUTPATIENT)
Dept: ENDOCRINOLOGY | Facility: CLINIC | Age: 76
End: 2021-04-13
Payer: COMMERCIAL

## 2021-04-13 DIAGNOSIS — R79.89 ELEVATED PARATHYROID HORMONE: ICD-10-CM

## 2021-04-13 DIAGNOSIS — E03.9 ACQUIRED HYPOTHYROIDISM: ICD-10-CM

## 2021-04-13 DIAGNOSIS — E83.52 HYPERCALCEMIA: Primary | ICD-10-CM

## 2021-04-13 PROCEDURE — 99214 OFFICE O/P EST MOD 30 MIN: CPT | Mod: 95 | Performed by: INTERNAL MEDICINE

## 2021-04-13 NOTE — LETTER
4/13/2021       RE: Kat Diego  [address]  [facility] [address]     Dear Colleague,    Thank you for referring your patient, Kat Diego, to the [facility] [place] at [facility]. Please see a copy of my visit note below.    Kat Diego  is being evaluated via a billable video visit.      How would you like to obtain your AVS? [employer]  For the video visit, send the invitation by: Text to cell phone: [phone]  Will anyone else be joining your video visit? No        Video Start Time: 12:30    Assessment & Plan     1. Hypercalcemia.   With elevated PTH, the most likely diagnosis is primary hyperparathyroidism.     As per the full explanation from last visits, and with no major changes in her labs or clinical condition to warrant surgery, Patient is electing to observe for now.     In the meantime:  - continue calcium in diet and MVI only. No extra supplements.   - continue vitamin D 1000 international unit(s) daily  - continue hydrochlorothiazide since her BP is good        2. Acquired Hypothyroidism  She is on 25 mcg of levothyroxine. TSH 5.8. c/o hair thinning. Consider increasing the dose slightly by taking 25 mcg alternating with 50 mcg every other day. Patient wished to have her PCP take a look at this and make the final decisions.       Review of the result(s) of each unique test - labs done at [facility] 3/29/2021, [facility] and documented in HPI below     30 minutes spent on the date of the encounter doing chart review, history and exam, documentation and further activities per the note       Follow up in 6 months.     Benigno Camejo MD  [facility] [place]    ----------------------------------------------------------------------    Subjective   Kat is a 75 year old who presents for the following health issues: hypercalcemia.     HPI     I initially evaluated her  in March 2020. Briefly: Hypercalcemia was first noted in September 2016.  Total calcium then was 11 mg/dL.  PTH check then was 60, which was in the normal range. Subsequent calcium levels checked since then have been elevated, at 10.9 in 2018, and 10.6 in 2019.  And 11.2 on 3/9/2020. PTH on 3/9/2020 was elevated at 91 pg/mL, with reference range of 10-86.  Vitamin D level was 50.9 ng/mL, other labs at the same date showed normal TSH of 4.6, normal creatinine and normal albumin.     DXA done on 6/11/2019 showed osteopenia with lowest T score of -1.2 at the left femoral neck.    March 2020 I asked her to discontinue calcium supplementation she was consuming 5 dairy servings/day). She continued vitamin D 2000 international unit(s) daily, and hydrochlorothiazide.   Labs 9/21/2020: Vitamin D: 44.6, , Calcium 10.6.     Interval History:   Last visit November 2020. I asked her to reduce vitamin D to 1000 international unit(s) daily and continue hydrochlorothiazide as long as she is not symptomatic from hypercalcemia.     Labs 3/29/2021: Vitamin D not checked. , Calcium, 10.2 (normal 8.5-10.5).     Symptoms of hypercalcemia: No GERD, has constipation for which she takes psyllium (chronic long standing), no dyspepsia, no mental status changes/lethargy, some more polyuria. Drinks 1 gallon of water a day.  Reports some more Hair thinning  Good energy level  Went back to the gym  Taking hydrochlorothiazide for HTN.     For hypothyroidism, she takes Levothyroxine 25 mcg.   TSH 5.89, just checked March 2021.     Other risk factors reviewed in initial visit:   - Previous fractures: No  - Family history of fragility fracture in parent: No  - History of kidney stones: No  - History of kidney disease: No  - Family history of any calcium problems or kidney stones: No  - History of vitamin D deficiency: No  - History of HCTZ use: Yes, since 2016  - History of Lithium use: No  - History of malabsorption (IBD, Celiac,  gastric bypass ): No  - History of thyroid disease: Yes: on LT4 25 mcg  - Weight history: stable      Review of Systems   Constitutional, HEENT, cardiovascular, pulmonary, gi and gu systems are negative, except as otherwise noted.      Objective           Vitals:  No vitals were obtained today due to virtual visit.    Physical Exam   GENERAL: Healthy, alert and no distress  EYES: Eyes grossly normal to inspection.  No discharge or erythema, or obvious scleral/conjunctival abnormalities.  RESP: No audible wheeze, cough, or visible cyanosis.  No visible retractions or increased work of breathing.    SKIN: Visible skin clear. No significant rash, abnormal pigmentation or lesions.  NEURO: Cranial nerves grossly intact.  Mentation and speech appropriate for age.  PSYCH: Mentation appears normal, affect normal/bright, judgement and insight intact, normal speech and appearance well-groomed.          Video-Visit Details    Type of service:  Video Visit    Video End Time:12:55    Originating Location (pt. Location): Home    Distant Location (provider location):  [facility] [place]     Platform used for Video Visit: [employer]

## 2021-04-28 ENCOUNTER — RECORDS - HEALTHEAST (OUTPATIENT)
Dept: ADMINISTRATIVE | Facility: OTHER | Age: 76
End: 2021-04-28

## 2021-05-27 ASSESSMENT — PATIENT HEALTH QUESTIONNAIRE - PHQ9
SUM OF ALL RESPONSES TO PHQ QUESTIONS 1-9: 0
SUM OF ALL RESPONSES TO PHQ QUESTIONS 1-9: 7

## 2021-05-28 ASSESSMENT — ANXIETY QUESTIONNAIRES: GAD7 TOTAL SCORE: 0

## 2021-05-28 NOTE — TELEPHONE ENCOUNTER
Left message to call back for: establishing care   Information to relay to patient:  Patient will need to make an appointment to establish care with new provider. Please assist patient with schedule est care appt.

## 2021-05-29 NOTE — PROGRESS NOTES
ASSESSMENT:  1. Chronic right-sided thoracic back pain  TiZANidine (ZANAFLEX) 2 MG capsule       PLAN:  Right-sided back pain.  Discussed physical therapy.  Patient has a follow-up appoint with Dr. PANIAGUA next week and would like to try low-dose muscle relaxant after discussed that for about a week and if still bothersome at that point when she follows up would be willing to do some physical therapy.  Blood pressure was in range when checked today.  Continue to monitor.  No problem-specific Assessment & Plan notes found for this encounter.      There are no Patient Instructions on file for this visit.    No orders of the defined types were placed in this encounter.    There are no discontinued medications.    No follow-ups on file.    CHIEF COMPLAINT:  Chief Complaint   Patient presents with     Back Pain     c/o low back pain, unable to sleep at night due to pain      Blood Pressure Check     pt is concerned about her blood pressure and her heart rate        HISTORY OF PRESENT ILLNESS:  Kat is a 73 y.o. female here today to discuss low back pain.  Patient is unable to sleep at night due to pain.  Cannot find a comfortable position.  Patient is also concerned about her blood pressure and her heart rate, today was good in clinic, but has had varied numbers.  No symptoms related to blood pressure are present.  Patient complains of low back pain but actually it is more in the thoracic area, it is localized and not radiating to any other region.  No sciatic pain.  No bowel or bladder changes.  No known injury that she is aware of.    REVIEW OF SYSTEMS:        All other systems are negative  PFSH:  Reviewed, no changes      TOBACCO USE:  Social History     Tobacco Use   Smoking Status Never Smoker   Smokeless Tobacco Never Used       VITALS:  Vitals:    05/14/19 1322   Resp: 16   Weight: 181 lb 6 oz (82.3 kg)     Wt Readings from Last 3 Encounters:   05/14/19 181 lb 6 oz (82.3 kg)   01/16/19 177 lb (80.3 kg)   03/19/18  180 lb (81.6 kg)       PHYSICAL EXAM:   Resp 16   Wt 181 lb 6 oz (82.3 kg)   LMP 08/12/1993   BMI 29.27 kg/m    General appearance: alert, appears stated age and cooperative  Back: range of motion normal, Tenderness to palpation right thoracic area slightly below CVA  Lungs: clear to auscultation bilaterally  Heart: regular rate and rhythm, S1, S2 normal, no murmur, click, rub or gallop  Extremities: extremities normal, atraumatic, no cyanosis or edema  Pulses: 2+ and symmetric  Neurologic: Grossly normal    DATA REVIEWED:  Additional History from Old Records Summarized (2): 0  Decision to Obtain Records (1): 0  Radiology Tests Summarized or Ordered (1): 0  Labs Reviewed or Ordered (1): 0  Medicine Test Summarized or Ordered (1): 00  Independent Review of EKG or X-RAY(2 each): 0    The visit lasted a total of 25 minutes face to face with the patient. Over 50% of the time was spent counseling and educating the patient about plan of care.    MEDICATIONS:  Current Outpatient Medications   Medication Sig Dispense Refill     b complex vitamins capsule Take 1 capsule by mouth daily.       calcium-vitamin D (CALCIUM-VITAMIN D) 500 mg(1,250mg) -200 unit per tablet Take 1 tablet by mouth daily.        cholecalciferol, vitamin D3, 1,000 unit tablet Take 1,000 Units by mouth daily.       CINNAMON BARK (CINNAMON ORAL) Take 1 tablet by mouth daily.        coenzyme Q10 100 mg capsule Take 100 mg by mouth daily.       GLUC/CHND/OM3/DHA/EPA/FISH/STR (GLUCOSAMINE CHONDROITIN PLUS ORAL) Take 1 tablet by mouth daily.        hydroCHLOROthiazide (HYDRODIURIL) 12.5 MG tablet Take 1 tablet (12.5 mg total) by mouth daily. 90 tablet 3     levothyroxine (SYNTHROID, LEVOTHROID) 25 MCG tablet Take 1 tablet (25 mcg total) by mouth Daily at 6:00 am. 90 tablet 3     magnesium oxide (MAG-OX) 400 mg tablet Take 400 mg by mouth daily.       multivitamin with minerals (THERA-M) 4.5 mg iron-200 mcg Tab Take 1 split tab by mouth 2 (two) times a day.        niacin (SLO-NIACIN) 500 mg ER tablet Take 500 mg by mouth every morning.       OMEGA-3/DHA/EPA/FISH OIL (FISH OIL-OMEGA-3 FATTY ACIDS) 300-1,000 mg capsule Take 2 g by mouth bedtime.        clindamycin (CLEOCIN) 300 MG capsule Take 2 capsules by mouth. 1 hour before procedure.       TiZANidine (ZANAFLEX) 2 MG capsule Take 1 capsule (2 mg total) by mouth at bedtime as needed for muscle spasms. 20 capsule 0     No current facility-administered medications for this visit.        This note has been dictated using voice recognition software. Any grammatical or context distortions are unintentional and inherent to the software

## 2021-05-29 NOTE — PROGRESS NOTES
Assessment:     1. Right-sided thoracic back pain, unspecified chronicity  XR Ribs Right    TiZANidine (ZANAFLEX) 2 MG capsule    Ambulatory referral to Physical Therapy   2. Post-menopausal  DXA Bone Density Scan   3. Hypothyroidism, unspecified type     4. Essential hypertension       X-rays ordered and reviewed by me.  No acute fractures.  Continue symptoms care.  Printed education materials provided.    Other medical conditions are reviewed.  She has stable blood pressures and her thyroid labs have been checked in January.      Plan:      The diagnosis was discussed with the patient and evaluation and treatment plans outlined.  See orders for lab and imaging studies.     Subjective:      Kat Diego is a  female who presents for evaluation of   Chief Complaint   Patient presents with     [facility]     Requesting meet and greet- former Dr Babin Pt     Follow-up     Pt here today to follow up thoracic back pain- Pt states its 50% better, muscle relaxer helping at night, feels its improving     Patient is here for follow-up of her back pain this is more on her rib area in the back side of the right lateral ribs.  She feels her back pain is much better with using Flexeril and she would like a refill of them.    She does not report any difficulty in breathing.  Pain with breathing or chest pain.  Appetite is good.    She is trying to follow with a group for healthy lifestyles and dietary modification to lose weight.      The following portions of the patient's history were reviewed and updated as appropriate: allergies, current medications, past family history, past medical history, past social history, past surgical history and problem list.  Allergies   Allergen Reactions     Hay Fever And Allergy Relief      Penicillins Rash     Whole body itchy rash.       Current Outpatient Medications on File Prior to Visit   Medication Sig Dispense Refill     b complex vitamins capsule Take 1 capsule by  mouth daily.       calcium-vitamin D (CALCIUM-VITAMIN D) 500 mg(1,250mg) -200 unit per tablet Take 1 tablet by mouth daily.        cholecalciferol, vitamin D3, 1,000 unit tablet Take 1,000 Units by mouth daily.       CINNAMON BARK (CINNAMON ORAL) Take 1 tablet by mouth daily.        coenzyme Q10 100 mg capsule Take 100 mg by mouth daily.       GLUC/CHND/OM3/DHA/EPA/FISH/STR (GLUCOSAMINE CHONDROITIN PLUS ORAL) Take 1 tablet by mouth daily.        hydroCHLOROthiazide (HYDRODIURIL) 12.5 MG tablet Take 1 tablet (12.5 mg total) by mouth daily. 90 tablet 3     levothyroxine (SYNTHROID, LEVOTHROID) 25 MCG tablet Take 1 tablet (25 mcg total) by mouth Daily at 6:00 am. 90 tablet 3     magnesium oxide (MAG-OX) 400 mg tablet Take 400 mg by mouth daily.       multivitamin with minerals (THERA-M) 4.5 mg iron-200 mcg Tab Take 1 split tab by mouth 2 (two) times a day.       niacin (SLO-NIACIN) 500 mg ER tablet Take 500 mg by mouth every morning.       OMEGA-3/DHA/EPA/FISH OIL (FISH OIL-OMEGA-3 FATTY ACIDS) 300-1,000 mg capsule Take 2 g by mouth bedtime.        clindamycin (CLEOCIN) 300 MG capsule Take 2 capsules by mouth. 1 hour before procedure.       dexamethasone sodium phosphate (DECADRON) 0.1 % ophthalmic solution INSTILL ONE DROP INTO AFFECTED EYE 3 TIMES DAILY  11     hydrocortisone 2.5 % cream APPLY TO AFFECTED AREA TWICE A DAY  11     No current facility-administered medications on file prior to visit.        Patient Active Problem List   Diagnosis     Hypothyroidism     Colon polyps     Knee osteoarthritis     Acute blood loss anemia     History of left knee replacement     Essential hypertension       Past Medical History:   Diagnosis Date     Allergic rhinitis      Atopic dermatitis      Disease of thyroid gland     hypo     Diverticul disease small and large intestine, no perforati or abscess      Rosacea        Past Surgical History:   Procedure Laterality Date     COLONOSCOPY       DENTAL SURGERY       DILATION AND  CURETTAGE OF UTERUS       DE TOTAL KNEE ARTHROPLASTY Left 2015    Procedure: KNEE TOTAL ARTHROPLASTY, LEFT;  Surgeon: Rolf Helton MD;  Location: [facility] Main OR;  Service: Orthopedics     DE TOTAL KNEE ARTHROPLASTY Right 2016    Procedure: RIGHT KNEE TOTAL ARTHROPLASTY;  Surgeon: Rolf Helton MD;  Location: [facility] Main OR;  Service: Orthopedics     TOTAL KNEE ARTHROPLASTY Left 2015    [facility]; Rolf Helton MD     WISDOM TOOTH EXTRACTION         Family History   Problem Relation Age of Onset     Heart disease Mother      Stroke Mother      Colon cancer Father      Hyperlipidemia Sister      Hypertension Sister      Heart disease Brother 60        Massive MI,  in sleep       Social History     Socioeconomic History     Marital status:      Spouse name: None     Number of children: None     Years of education: None     Highest education level: None   Occupational History     None   Social Needs     Financial resource strain: None     Food insecurity:     Worry: None     Inability: None     Transportation needs:     Medical: None     Non-medical: None   Tobacco Use     Smoking status: Never Smoker     Smokeless tobacco: Never Used   Substance and Sexual Activity     Alcohol use: Yes     Comment: rarely     Drug use: No     Sexual activity: None   Lifestyle     Physical activity:     Days per week: None     Minutes per session: None     Stress: None   Relationships     Social connections:     Talks on phone: None     Gets together: None     Attends Mandaeism service: None     Active member of club or organization: None     Attends meetings of clubs or organizations: None     Relationship status: None     Intimate partner violence:     Fear of current or ex partner: None     Emotionally abused: None     Physically abused: None     Forced sexual activity: None   Other Topics Concern     None   Social History Narrative    Patient is a .  She follows with a  Denominational group and like minded ladies for healthy lifestyles and dietary modifications.        Kimmie Cain MD  6/2/2019           Review of Systems  Constitutional: negative  Respiratory: negative  Cardiovascular: negative  Gastrointestinal: negative       Objective:     /71 (Patient Site: Left Arm, Patient Position: Sitting, Cuff Size: Adult Large)   Pulse 72   Resp 16   Wt 181 lb 12.8 oz (82.5 kg)   LMP 08/12/1993   SpO2 96%   BMI 29.34 kg/m      General Appearance: healthy, alert, oriented and no distress  HEENT Exam: Oropharynx clear without lesion or exudate  Neck:  supple, no adenopathy, thyroid normal  Heart: Normal heart sounds, S1 and S2, No murmurs.  Lungs: Normal breath sounds, Clear to auscultation bilateral  MSK: Some tenderness in the eighth and ninth lateral rib area and in general the  Para vertebral area of the spine.  Skin exam: Warm and well-perfused  Neurological exam: gait normal, alert and oriented X 3  Hem/Lymph/Immuno exam: negative findings:  no cervical nodes, no supraclavicular nodes,

## 2021-05-29 NOTE — PROGRESS NOTES
[facility] Daily Progress     Patient Name: Kat Diego  Date: 2019  Visit #:    PTA visit #:   Referral Diagnosis: Right-sided thoracic back pain, unspecified chronicity [M54.6]  - Primary   Referring provider: Kimmie Cain MD  Visit Diagnosis:     ICD-10-CM    1. Pain in thoracic spine M54.6    2. Generalized muscle weakness M62.81    3. Poor posture R29.3          Assessment:   Patient returns for first PT FU. She reports improved pain since IE and has been able to perform ADL's without pain medication. Appears happy with her progress and is appropriate for continued postural strengthening and manual therapy at next visit.     Patient is benefitting from skilled physical therapy and is making steady progress toward functional goals.  Patient is appropriate to continue with skilled physical therapy intervention, as indicated by initial plan of care.    Goal Status:  Pt. will demonstrate/verbalize independence in self-management of condition in : 2 weeks  Pt. will be independent with home exercise program in : 2 weeks    Pt will: transition supine<>sidelying or supine<>sit in the AM without increased pain to improve morning transferes in 8 weeks   Pt will: stand unrestricted due to pain to improve ADL's in 8 weeks  Pt will: report occurance of pain/spasms 2x/week or less to improve ADL's and recreation in 8 weeks      Plan / Patient Education:     Continue with initial plan of care.  Progress with home program as tolerated.   Consider lumbar mobilizations (upper lumbar/lower thoracic), postural strengthening - scap strengthening, bridges, thoracic lift.    Subjective:   Patient reports significant improvement in pain since IE. Appears happy with her progress.     Pain Ratin at rest      Objective:       Treatment Today     TREATMENT MINUTES COMMENTS   Evaluation     Self-care/ Home management     Manual therapy 20 PA's T11-L3 grade III - AP  Transverse PA's grade III with  "oscillations T11-L3  Pinch and roll over R paraspinals and QL   Neuromuscular Re-education     Therapeutic Activity     Therapeutic Exercises 9 Exercises reviewed/progressed:  Exercise #1: Reach and roll   Comment #1: x 5-10 reps B, hold 5\"   Exercise #2: TA- advanced during session to TA with alternating march, slow, with breathin  Comment #2: x 10 reps, 2-3 sets        Gait training     Modality__________________                Total 29    Blank areas are intentional and mean the treatment did not include these items.       Mildred Gee  6/13/2019    "

## 2021-05-29 NOTE — PROGRESS NOTES
[facility] Daily Progress     Patient Name: Kat Diego  Date: 2019  Visit #: 3/8   PTA visit #:   Referral Diagnosis: Right-sided thoracic back pain, unspecified chronicity [M54.6]  - Primary   Referring provider: Kimmie Cain MD  Visit Diagnosis:     ICD-10-CM    1. Pain in thoracic spine M54.6    2. Generalized muscle weakness M62.81    3. Poor posture R29.3          Assessment:     HEP/POC compliance is  good .  The patient demonstrates improvements in lumbar ROM after treatment today.  She has a good understanding of her HEP and is appropriate to continue with skilled PT services at this time.    Goal Status:  Pt. will demonstrate/verbalize independence in self-management of condition in : 2 weeks  Pt. will be independent with home exercise program in : 2 weeks    Pt will: transition supine<>sidelying or supine<>sit in the AM without increased pain to improve morning transferes in 8 weeks   Pt will: stand unrestricted due to pain to improve ADL's in 8 weeks  Pt will: report occurance of pain/spasms 2x/week or less to improve ADL's and recreation in 8 weeks    Plan / Patient Education:     Continue with initial plan of care.  Progress with home program as tolerated.   Continue with lumbar mobilizations if helpful; postural strengthening - scap strengthening, thoracic lift.    Subjective:     Pain Ratin   The patient reports that she was doing well until Saturday and then on  her back started bothering her again.  She had a speech yesterday and after 25 minutes, her back really started hurting.  Overall, her pain is 20% less than when she started.  It feels like her pain moved lower in her spine.    Objective:     Pain with lumbar extension on the R as well as R side bending on the R.  Pain with thoracic R side bending on the R.    Appt time: 11:00AM - 11:28AM    Treatment Today     TREATMENT MINUTES COMMENTS   Evaluation     Self-care/ Home management     Manual therapy 15  "-L S/L R lumbar rotation mobs targeting upper lumbar vertebra and then lower lumbar vertebra grades I-II   Neuromuscular Re-education     Therapeutic Activity     Therapeutic Exercises 13 Exercises reviewed/progressed:  Exercise #1: Reach and roll   Comment #1: HEP x 5-10 reps B, hold 5\"   Exercise #2: TA- advanced during session to TA with alternating march, slow, with breathing  Comment #2: HEP x 10 reps, 2-3 sets   Exercise #3: LTR  Comment #3: x 10 with 5\" hold  Exercise #4: Bridge  Comment #4: x 10; cues to roll up and roll down her spine     Gait training     Modality__________________                Total 28    Blank areas are intentional and mean the treatment did not include these items.       Maxine Torres, PT, DPT  6/19/2019  "

## 2021-05-29 NOTE — PROGRESS NOTES
[facility] Certification Request    June 11, 2019      Patient: Kat Diego  MR Number: [id number]  YOB: 1945  Date of Visit: 6/11/2019      Dear Dr. Kimmie Cain,     Thank you for this referral.   We are seeing Kat Diego for Physical Therapy for thoracic pain.    Medicare and/or Medicaid requires physician review and approval of the treatment plan. Please review the plan of care and verify that you agree with the therapy plan of care by co-signing this note.      Plan of Care  Authorization / Certification Start Date: 06/11/19  Authorization / Certification End Date: 08/20/19  Authorization / Certification Number of Visits: up to 8 visits   Communication with: Referral Source  Patient Related Instruction: Nature of Condition;Treatment plan and rationale;Self Care instruction;Basis of treatment;Body mechanics;Precautions;Next steps;Posture  Times per Week: 1x/week  Number of Weeks: up to 10 weeks   Number of Visits: up to 8 visits   Discharge Planning: to I HEP  Therapeutic Exercise: ROM;Stretching;Strengthening  Neuromuscular Reeducation: kinesio tape;posture;balance/proprioception;TNE;postural restoration;core  Manual Therapy: soft tissue mobilization;myofascial release;joint mobilization;muscle energy  Other Plan #1: therapeutic activity       Goals:  Pt. will demonstrate/verbalize independence in self-management of condition in : 2 weeks  Pt. will be independent with home exercise program in : 2 weeks    Pt will: transition supine<>sidelying or supine<>sit in the AM without increased pain to improve morning transferes in 8 weeks   Pt will: stand unrestricted due to pain to improve ADL's in 8 weeks  Pt will: report occurance of pain/spasms 2x/week or less to improve ADL's and recreation in 8 weeks        If you have any questions or concerns, please don't hesitate to call.    Sincerely,      Mildred Gee, PT        Physician recommendation:     ___ Follow  therapist's recommendation        ___ Modify therapy      *Physician co-signature indicates they certify the need for these services furnished within this plan and while under their care.    [facility]   [facility] Initial Evaluation    Patient Name: Kat Diego  Date of evaluation: 6/11/2019  Referral Diagnosis: Right-sided thoracic back pain, unspecified chronicity [M54.6]  - Primary   Referring provider: Kimmie Cain MD  Visit Diagnosis:     ICD-10-CM    1. Pain in thoracic spine M54.6    2. Generalized muscle weakness M62.81    3. Poor posture R29.3        Assessment:   Kat Diego is a 73 y.o. female who presents to therapy today with chief complaints of right sided thoracic/rib pain since March 2019 without specific injury. Pain is located along right thoracic/flank along floating ribs. Pain is aggravated by standing for long periods, transitions, repetitive movements causing mm spasms. Evaluation reveals: restrictions in lumbar extension and R SB with pain, postural deficits, weakness, + facet hypomobility and myofascial tightness R flank. S/s appear consistent with R sided facet pain and hypomobility with radiation along ribs. Patient will benefit from 1:1 skilled physical therapy services to address the above limitations.       Goals:  Pt. will demonstrate/verbalize independence in self-management of condition in : 2 weeks  Pt. will be independent with home exercise program in : 2 weeks    Pt will: transition supine<>sidelying or supine<>sit in the AM without increased pain to improve morning transferes in 8 weeks   Pt will: stand unrestricted due to pain to improve ADL's in 8 weeks  Pt will: report occurance of pain/spasms 2x/week or less to improve ADL's and recreation in 8 weeks      Patient's expectations/goals are realistic.    Barriers to Learning or Achieving Goals:  No Barriers.       Plan / Patient Instructions:        Plan of Care:   Authorization / Certification  Start Date: 06/11/19  Authorization / Certification End Date: 08/20/19  Authorization / Certification Number of Visits: up to 8 visits   Communication with: Referral Source  Patient Related Instruction: Nature of Condition;Treatment plan and rationale;Self Care instruction;Basis of treatment;Body mechanics;Precautions;Next steps;Posture  Times per Week: 1x/week  Number of Weeks: up to 10 weeks   Number of Visits: up to 8 visits   Discharge Planning: to I HEP  Therapeutic Exercise: ROM;Stretching;Strengthening  Neuromuscular Reeducation: kinesio tape;posture;balance/proprioception;TNE;postural restoration;core  Manual Therapy: soft tissue mobilization;myofascial release;joint mobilization;muscle energy  Other Plan #1: therapeutic activity       Plan for next visit: scap and core strengthening, side body stretch      Subjective:          Honey Diego is a 72 y/o female who presents today with chief c/o right sided thoracic/rib pain since March 2019. Pt is unsure when this happened exactly but was lifting her suitcase/luggage. Did not feel pain immediately, but later in April 2019 and has been worsening since.Has some days without pain, fluctuates from day to day. Pt notices pain/spasms when she takes her shoes off at the end of the day. No pain with sleeping, except rolling onto her right side and sleeping in this position. Also notices this pain with transitions. Notices pain after walking or after standing for long periods.   Notices pain with standing.Did not have any other labs/cardiovascular/GI issues. Pain travels down lateral leg on her right leg to her calf.   Estimates frequency 1x/day or more often.       EXAM: XR RIBS RIGHT  LOCATION: [facility]  DATE/TIME: 5/28/2019 1:39 PM     INDICATION: Right 8-9 rib posterior lateral pain.  COMPARISON: None.     FINDINGS: The visualized heart and lungs are negative. No rib fracture identified.    Social information:   Living Situation:single family  home   Occupation:retired    Pain Ratin  Pain rating at best: 1  Pain rating at worst: 7  Pain description: aching and sharp    Patient reports benefit from:  rest           Objective:      Note: Items left blank indicates the item was not performed or not indicated at the time of the evaluation.    Patient Outcome Measures :    Modified Oswestry Low Back Pain Disablity Questionnaire  in %: 20     Scores range from 0-100%, where a score of 0% represents minimal pain and maximal function. The minimal clinically important difference is a score reduction of 12%.    Examination  1. Pain in thoracic spine     2. Generalized muscle weakness     3. Poor posture         Precautions/Restrictions: Hx of TKA  Involved side: Right    Posture Observation:   Standing:  Sitting:        Lumbar ROM:    Date:      *Indicate scale AROM AROM AROM   Lumbar Flexion + L FB no pain     Lumbar Extension + pain on R side      Right Left Right Left Right Left   Lumbar Sidebending 50% R sided pain Full no pain       Lumbar Rotation Full no pain Full no pain        Thoracic Flexion      Thoracic Extension      Thoracic Sidebending         Thoracic Rotation           Lower Extremity Strength:     Date:      LE strength/5 Right Left Right Left Right Left   Hip Flexion (L1-3) 4 4       Hip Extension (L5-S1)         Hip Abduction (L4-5)         Hip Adduction (L2-3)         Hip External Rotation         Hip Internal Rotation         Knee Extension (L3-4) 5 5       Knee Flexion         Ankle Dorsiflexion (L4-5) 5 4       Great Toe Extension (L5)         Ankle Plantar flexion (S1)         Abdominals                       Reflex Testing:  Patellar (L3-4):  Achilles (S1-2)     Sensation:      Hip PROM with overpressure:   L hip: flexion, IR/ER WNL  HS tightness present with SLR and cross over  IR increased, ER decreased    R hip:  ER tightness present B with + LEANA R>L sides  IR increased, ER decreased    Pt resting supine with B knee valgus and IR  "of both femur,tibia, foot      ROM knee:  R knee ext: 9 from 0  L knee ext: 5 from 0       Lumbar Special Tests:    Lumbar Special Tests Right Left SI Tests Right  Left   Quadrant test   SI Compression - -   Straight leg raise   SI Distraction - -   Crossover response   Thigh Thrust - -   Slump   Sacral Thrust - -     LEANA     Trunk extensor endurance test  Resisted Abduction     Prone instability test  Other:         Palpation:   + lateral R IT band pain and lateral hip pain     Repeated Motion Testing:      Passive Mobility - Joint Integrity:  PA's:   +R hypomobility throughout T11-L4  + R sided pain T12, L1       Treatment Today     TREATMENT MINUTES COMMENTS   Evaluation 26 Thoracic/lumbar eval   Self-care/ Home management     Manual therapy 20 PA's T11-L3  Transverse PA's with oscillations T11-L3  Pinch and roll over R paraspinals and QL    * no pain with lumbar extension post treatment  * no pain with R SB post treatment with improved ROM - pt notes \"I can only feel it's there\".    Neuromuscular Re-education     Therapeutic Activity     Therapeutic Exercises 9 Added to HEP:  Reach and roll - full reach and roll with slight hold into pec strech x 3-4 B   Gait training     Modality__________________                Total 55    Blank areas are intentional and mean the treatment did not include these items.           PT Evaluation Code: (Please list factors)  Patient History/Comorbidities: Hx of B TKA, HTN, hypothyroid  Examination: see above  Clinical Presentation: stable  Clinical Decision Making: low    Patient History/  Comorbidities Examination  (body structures and functions, activity limitations, and/or participation restrictions) Clinical Presentation Clinical Decision Making (Complexity)   No documented Comorbidities or personal factors 1-2 Elements Stable and/or uncomplicated Low   1-2 documented comorbidities or personal factor 3 Elements Evolving clinical presentation with changing characteristics " Moderate   3-4 documented comorbidities or personal factors 4 or more Unstable and unpredictable High           Mildred Gee  6/11/2019  3:02 PM

## 2021-05-30 VITALS — WEIGHT: 168 LBS | BODY MASS INDEX: 27.12 KG/M2

## 2021-05-30 NOTE — PROGRESS NOTES
[facility] Daily Progress     Patient Name: Kat Diego  Date: 7/29/2019   Visit #: 1/4 (8/8 prior to recert)   PTA visit #:   Referral Diagnosis: Right-sided thoracic back pain, unspecified chronicity [M54.6]  - Primary   Referring provider: Kimmie Cain MD  Visit Diagnosis:     ICD-10-CM    1. Pain in thoracic spine M54.6    2. Generalized muscle weakness M62.81    3. Poor posture R29.3      Kat Diego is a 73 y.o. female who presents to therapy today with chief complaints of right sided thoracic/rib pain since March 2019 without specific injury. Pain is located along right thoracic/flank along floating ribs. Pain is aggravated by standing for long periods, transitions, repetitive movements causing mm spasms. Evaluation reveals: restrictions in lumbar extension and R SB with pain, postural deficits, weakness, + facet hypomobility and myofascial tightness R flank. S/s appear consistent with R sided facet pain and hypomobility with radiation along ribs. Patient will benefit from 1:1 skilled physical therapy services to address the above limitations.     Assessment:     Patient reports improvements in her pain level and mobility since last week, with decreased pain and increased mobility.  She has been compliant with her HEP but still feels as if her back can get locked up. She felt much benefit from manual therapy and added exercise last week.  Overall, her symptoms have significantly improved since beginning skilled PT services.  She would like to continue with PT as she is making improvements.    Patient is benefiting from skilled physical therapy and is making steady progress toward functional goals.  Patient is appropriate to continue with skilled physical therapy intervention, as indicated by initial plan of care.    Goal Status:  Pt. will demonstrate/verbalize independence in self-management of condition in : 2 weeks;Progressing toward  Pt. will be independent with home  "exercise program in : 2 weeks;Progressing toward    Pt will: transition supine<>sidelying or supine<>sit in the AM without increased pain to improve morning transferes in 8 weeks; Progressing toward  Pt will: stand unrestricted due to pain to improve ADL's in 8 weeks; Progressing toward  Pt will: report occurance of pain/spasms 2x/week or less to improve ADL's and recreation in 8 weeks; Progressing toward    Plan / Patient Education:     Continue with initial plan of care.  Progress with home program as tolerated.   Continue with lumbar/thoracic mobilizations if helpful (gentle grade due to patient sensitivity); postural strengthening, follow up less frequent after next week.    Subjective:     Pain Rating: 3/10   Patient reports the past week has been a great improvement in her pain and symptoms. She reports improvement is closer to 70%. Side lying stretch has seemed to help over the last week or the manual therapy that was performed. She reports being pain free Friday, Saturday, Sunday but having a slight increase in pain today.    Objective:     Pain with thoracic flexion and R side bending thoracic spine.  Pain with lumbar flexion, extension, R side bending, and L rotation.    Appt time: 11:00AM - 11:29AM    Treatment Today     TREATMENT MINUTES COMMENTS   Evaluation     Self-care/ Home management     Manual therapy 16 -L S/L upper lumbar/lower thoracic rotational mobilizations grade II; also did distraction mobs x 20 reps  -L S/L mobilizations directly to R facet T10 and T11 as well as costochondral joints T10 and T11 x 30 x 3 each grade III   Neuromuscular Re-education     Therapeutic Activity     Therapeutic Exercises 13 Exercise #1: Reach and roll   Comment #1: HEP x 5-10 reps B, hold 5\"   Exercise #2: TA- advanced during session to TA with alternating march, slow, with breathing  Comment #2: HEP x 10 reps, 2-3 sets   Exercise #3: LTR  Comment #3: HEP  Exercise #4: Bridge  Comment #4: HEP  Exercise #5: " "Rafael  Comment #5: HEP  Exercise #6: Seated Cat/Cow  Comment #6: HEP  Exercise #7: L S/L with towel under side and arm above head for stretch  Comment #7: 30 seconds  -Patient to lie on L side with pillow underneath and reach above head to stretch R thoracic spine  - Added education on use of tennis ball for TPR as patient requested advice of how to \"rub and work out the muscle at home\"   Gait training     Modality__________________                Total 29    Blank areas are intentional and mean the treatment did not include these items.       Jocelin Mooney, PT, DPT  7/29/2019  "

## 2021-05-30 NOTE — PROGRESS NOTES
"[facility] Daily Progress     Patient Name: Kat Diego  Date: 2019  Visit #:    PTA visit #:   Referral Diagnosis: Right-sided thoracic back pain, unspecified chronicity [M54.6]  - Primary   Referring provider: Kimmie Cain MD  Visit Diagnosis:     ICD-10-CM    1. Pain in thoracic spine M54.6    2. Generalized muscle weakness M62.81    3. Poor posture R29.3          Assessment:   Patient returns to PT reporting nearly 75% improvement in pain. She continues to experience pain with sleeping on R side, standing for long periods, and walking long distances but pain intensity and frequency is improving. She tolerated mobilizations from last session and today's date with a reported decreased in pain post treatment.     Patient is benefitting from skilled physical therapy and is making steady progress toward functional goals.  Patient is appropriate to continue with skilled physical therapy intervention, as indicated by initial plan of care.      Goal Status:  Pt. will demonstrate/verbalize independence in self-management of condition in : 2 weeks  Pt. will be independent with home exercise program in : 2 weeks    Pt will: transition supine<>sidelying or supine<>sit in the AM without increased pain to improve morning transferes in 8 weeks   Pt will: stand unrestricted due to pain to improve ADL's in 8 weeks  Pt will: report occurance of pain/spasms 2x/week or less to improve ADL's and recreation in 8 weeks    Plan / Patient Education:     Continue with initial plan of care.  Progress with home program as tolerated.   Continue with lumbar mobilizations if helpful; postural strengthening - scap strengthening, thoracic lift.    Subjective:     Pain Ratin   Patient reports 75% improvement in pain overall since IE. \"Much better\".     Objective:     Minimal pain with extension, flexion and R SB post treatment today.     Treatment Today     TREATMENT MINUTES COMMENTS   Evaluation   " "  Self-care/ Home management     Manual therapy 25 -L S/L R lumbar rotation mobs mid-upper lumbar. Pt placed in traction position at approx T12/L1 with rotation.     - Transverse PA's L2-L5 R side grade III with oscillations, added MFR to R QL and along upper iliac crest, pt in prone.      Neuromuscular Re-education     Therapeutic Activity     Therapeutic Exercises 4 Exercises reviewed verbally:   Exercise #1: Reach and roll   Comment #1: HEP x 5-10 reps B, hold 5\"   Exercise #2: TA- advanced during session to TA with alternating march, slow, with breathing  Comment #2: HEP x 10 reps, 2-3 sets   Exercise #3: LTR  Comment #3: x 10 with 5\" hold  Exercise #4: Bridge  Comment #4: x 10; cues to roll up and roll down her spine  Exercise #5: Clamshell  Comment #5: no band - x 20 reps B with TA  attempted supine piriforms but pt did not feel stretch and c/o increased pain in groin on L side so discontinued. No new additions to HEP today.    Gait training     Modality__________________                Total 29    Blank areas are intentional and mean the treatment did not include these items.       Mildred Gee, PT, DPT  7/2/2019  "

## 2021-05-30 NOTE — PROGRESS NOTES
[facility] Re-Certification Request    July 24, 2019      Patient: Kat Diego  MR Number: [id number]  YOB: 1945  Date of Visit: 7/24/2019  Onset Date:  March 2019  Date of Eval: 6/11/19    Dear Dr. Cain:    As you may recall, we have been seeing Kat Diego for Physical Therapy of thoracic/lumbar back pain.    For therapy to continue, Medicare and/or Medicaid requires periodic physician review of the treatment plan. Please review the summary of the patient's progress and our plan for continued therapy, and verify  that you agree therapy should continue by entering certification dates at the bottom of this note and co-signing this note.    Plan of Care  Authorization / Certification Start Date: 07/24/19  Authorization / Certification End Date: 08/30/19  Authorization / Certification Number of Visits: up to 4 visits   Communication with: Referral Source  Patient Related Instruction: Nature of Condition;Treatment plan and rationale;Self Care instruction;Basis of treatment;Body mechanics;Precautions;Next steps;Posture  Times per Week: 1x/week  Number of Weeks: up to 5 weeks   Number of Visits: up to 4 visits   Discharge Planning: Patient will be discharged when independent in self-management of condition or when goals are met.  Precautions / Restrictions : None  Therapeutic Exercise: ROM;Stretching;Strengthening  Neuromuscular Reeducation: kinesio tape;posture;balance/proprioception;TNE;postural restoration;core  Manual Therapy: soft tissue mobilization;myofascial release;joint mobilization;muscle energy  Other Plan #1: therapeutic activity       Goal  Pt. will demonstrate/verbalize independence in self-management of condition in : 2 weeks;Progressing toward  Pt. will be independent with home exercise program in : 2 weeks;Progressing toward    Pt will: transition supine<>sidelying or supine<>sit in the AM without increased pain to improve morning transferes in 8 weeks;  "Progressing toward  Pt will: stand unrestricted due to pain to improve ADL's in 8 weeks; Progressing toward  Pt will: report occurance of pain/spasms 2x/week or less to improve ADL's and recreation in 8 weeks; Progressing toward        If you have any questions or concerns, please don't hesitate to call.    Sincerely,      Maxine Torres, PT, DPT        Physician recommendation:     ___ Follow therapist's recommendation        ___ Modify therapy      *Physician co-signature indicates they certify the need for these services furnished within this plan and while under their care.    [facility] Daily Progress     Patient Name: Kat Diego  Date: 7/24/2019   Visit #: 8/8   PTA visit #:   Referral Diagnosis: Right-sided thoracic back pain, unspecified chronicity [M54.6]  - Primary   Referring provider: Kimmie Cain MD  Visit Diagnosis:     ICD-10-CM    1. Pain in thoracic spine M54.6    2. Generalized muscle weakness M62.81    3. Poor posture R29.3      Kat Diego is a 73 y.o. female who presents to therapy today with chief complaints of right sided thoracic/rib pain since March 2019 without specific injury. Pain is located along right thoracic/flank along floating ribs. Pain is aggravated by standing for long periods, transitions, repetitive movements causing mm spasms. Evaluation reveals: restrictions in lumbar extension and R SB with pain, postural deficits, weakness, + facet hypomobility and myofascial tightness R flank. S/s appear consistent with R sided facet pain and hypomobility with radiation along ribs. Patient will benefit from 1:1 skilled physical therapy services to address the above limitations.     Assessment:     Patient reports improvements in her pain level and mobility after treatment today.  She has been compliant with her HEP but still feels like her back is \"locked up.\"  Overall, her symptoms have significantly improved since beginning skilled PT services.  She " would like to continue with PT as she is making improvements.    Patient is benefitting from skilled physical therapy and is making steady progress toward functional goals.  Patient is appropriate to continue with skilled physical therapy intervention, as indicated by initial plan of care.    Goal Status:  Pt. will demonstrate/verbalize independence in self-management of condition in : 2 weeks;Progressing toward  Pt. will be independent with home exercise program in : 2 weeks;Progressing toward    Pt will: transition supine<>sidelying or supine<>sit in the AM without increased pain to improve morning transferes in 8 weeks; Progressing toward  Pt will: stand unrestricted due to pain to improve ADL's in 8 weeks; Progressing toward  Pt will: report occurance of pain/spasms 2x/week or less to improve ADL's and recreation in 8 weeks; Progressing toward    Plan / Patient Education:     Continue with initial plan of care.  Progress with home program as tolerated.   Continue with lumbar/thoracic mobilizations if helpful; postural strengthening.    Subjective:     Pain Ratin  Patient reports that she feels like she has been going backwards for the last 2 weeks.  She will have really good days but she is also having more bad days.  She feels about 50% improved since beginning PT.  Her mm seem to be in spasm more frequently.  She has less tenderness to the touch.  She feels tight in her back when doing the seated cat/cow.    Objective:     Pain with thoracic flexion and R side bending thoracic spine.  Pain with lumbar flexion, extension, R side bending, and L rotation.    Appt time: 11:00AM - 11:27AM    Treatment Today     TREATMENT MINUTES COMMENTS   Evaluation     Self-care/ Home management     Manual therapy 15 -L S/L upper lumbar/lower thoracic rotational mobilizations grade II; also did distraction mobs x 20 reps  -L S/L mobilizations directly to R facet T10 and T11 as well as costochondral joints T10 and T11 x 30 x 3  "each grade III   Neuromuscular Re-education     Therapeutic Activity     Therapeutic Exercises 12 Exercise #1: Reach and roll   Comment #1: HEP x 5-10 reps B, hold 5\"   Exercise #2: TA- advanced during session to TA with alternating march, slow, with breathing  Comment #2: HEP x 10 reps, 2-3 sets   Exercise #3: LTR  Comment #3: HEP  Exercise #4: Bridge  Comment #4: HEP  Exercise #5: Clamshell  Comment #5: HEP  Exercise #6: Seated Cat/Cow  Comment #6: HEP  Exercise #7: L S/L with towel under side and arm above head for stretch  Comment #7: 30 seconds  -Patient to lie on L side with pillow underneath and reach above head to stretch R thoracic spine   Gait training     Modality__________________                Total 27    Blank areas are intentional and mean the treatment did not include these items.       Maxine Torres, PT, DPT  7/24/2019  "

## 2021-05-30 NOTE — PROGRESS NOTES
[facility] Daily Progress     Patient Name: Kat Diego  Date: 7/10/2019   Dates of service: 6/11/19-7/10/19  Visit #: 6/8   PTA visit #:   Referral Diagnosis: Right-sided thoracic back pain, unspecified chronicity [M54.6]  - Primary   Referring provider: Kimmie Cain MD  Visit Diagnosis:     ICD-10-CM    1. Pain in thoracic spine M54.6    2. Generalized muscle weakness M62.81    3. Poor posture R29.3      Kat Diego is a 73 y.o. female who presents to therapy today with chief complaints of right sided thoracic/rib pain since March 2019 without specific injury. Pain is located along right thoracic/flank along floating ribs. Pain is aggravated by standing for long periods, transitions, repetitive movements causing mm spasms. Evaluation reveals: restrictions in lumbar extension and R SB with pain, postural deficits, weakness, + facet hypomobility and myofascial tightness R flank. S/s appear consistent with R sided facet pain and hypomobility with radiation along ribs. Patient will benefit from 1:1 skilled physical therapy services to address the above limitations.     Assessment:     Patient returns to PT reporting continued improvements in her pain level and function.  At this time, her biggest issue is with sleeping on her R side.  She was able to tolerate treatment well and reported improvements after treatment.  She is progressing toward goals and is appropriate to continue with skilled PT services at this time.    Patient is benefitting from skilled physical therapy and is making steady progress toward functional goals.  Patient is appropriate to continue with skilled physical therapy intervention, as indicated by initial plan of care.    Goal Status:  Pt. will demonstrate/verbalize independence in self-management of condition in : 2 weeks;Progressing toward  Pt. will be independent with home exercise program in : 2 weeks;Progressing toward    Pt will: transition supine<>sidelying  "or supine<>sit in the AM without increased pain to improve morning transferes in 8 weeks; Progressing toward  Pt will: stand unrestricted due to pain to improve ADL's in 8 weeks; Progressing toward  Pt will: report occurance of pain/spasms 2x/week or less to improve ADL's and recreation in 8 weeks; Progressing toward    Plan / Patient Education:     Continue with initial plan of care.  Progress with home program as tolerated.   Continue with lumbar mobilizations if helpful; postural strengthening.    Subjective:     Pain Ratin   Patient reports that she was pretty sore after her last session, but since  she has been feeling really good.  She has not been taking Advil or using the heating pad.  She is having difficulty sleeping on her R side.  There is just the one spot that hurts.  She is doing her exercises 2x/day.    Objective:     Mild pain with lumbar extension ROM; Mod limited.  More tightness with L side bending than R.    Appt time: 9:30AM - 9:57AM    Treatment Today     TREATMENT MINUTES COMMENTS   Evaluation     Self-care/ Home management     Manual therapy 15 -L S/L R lumbar rotation mobs mid-upper lumbar.  -L S/L PA mobs to L1/L2 grade II-III   Neuromuscular Re-education     Therapeutic Activity     Therapeutic Exercises 12 Exercise #1: Reach and roll   Comment #1: HEP x 5-10 reps B, hold 5\"   Exercise #2: TA- advanced during session to TA with alternating march, slow, with breathing  Comment #2: HEP x 10 reps, 2-3 sets   Exercise #3: LTR  Comment #3: x 10 with 5\" hold  Exercise #4: Bridge  Comment #4: x 10; cues to roll up and roll down her spine  Exercise #5: Clamshell  Comment #5: no band - x 20 reps B with TA  Added thoracic lifts and scapular strengthening to HEP for posture.   Gait training     Modality__________________                Total 27    Blank areas are intentional and mean the treatment did not include these items.       Maxine Torres, PT, DPT  7/10/2019  "

## 2021-05-30 NOTE — PROGRESS NOTES
[facility] Daily Progress     Patient Name: Kat Diego  Date: 2019  Visit #:    PTA visit #:   Referral Diagnosis: Right-sided thoracic back pain, unspecified chronicity [M54.6]  - Primary   Referring provider: Kimmie Cain MD  Visit Diagnosis:     ICD-10-CM    1. Pain in thoracic spine M54.6    2. Generalized muscle weakness M62.81    3. Poor posture R29.3          Assessment:     HEP/POC compliance is  good .  Patient reports soreness following last visit but improved overall pain today. She continues to display significant lateral hip weakness as evidenced by knee valgus with amb and with sit>stand and other transfers. She will benefit from continued postural strengthening at next visit.     Goal Status:  Pt. will demonstrate/verbalize independence in self-management of condition in : 2 weeks  Pt. will be independent with home exercise program in : 2 weeks    Pt will: transition supine<>sidelying or supine<>sit in the AM without increased pain to improve morning transferes in 8 weeks   Pt will: stand unrestricted due to pain to improve ADL's in 8 weeks  Pt will: report occurance of pain/spasms 2x/week or less to improve ADL's and recreation in 8 weeks    Plan / Patient Education:     Continue with initial plan of care.  Progress with home program as tolerated.   Continue with lumbar mobilizations if helpful; postural strengthening - scap strengthening, thoracic lift.    Subjective:     Pain Ratin   Patient reports she was sore for approximately 3 days after last treatment, using ice/heat and pain medication. She has returned to baseline today feeing 20-25% improvement in pain since IE.    Objective:     Minimal pain with extension, flexion and R SB post treatment today.     Treatment Today     TREATMENT MINUTES COMMENTS   Evaluation     Self-care/ Home management     Manual therapy 15 -L S/L R lumbar rotation mobs mid-upper lumbar. Pt placed in traction position at approx  "T12/L1 with rotation.      Neuromuscular Re-education     Therapeutic Activity     Therapeutic Exercises 15 Exercises reviewed/progressed:  Exercise #1: Reach and roll   Comment #1: HEP x 5-10 reps B, hold 5\"   Exercise #2: TA- advanced during session to TA with alternating march, slow, with breathing  Comment #2: HEP x 10 reps, 2-3 sets   Exercise #3: LTR  Comment #3: x 10 with 5\" hold  Exercise #4: Bridge  Comment #4: x 10; cues to roll up and roll down her spine  Exercise #5: Clamshell  Comment #5: no band - x 20 reps B with TA     Gait training     Modality__________________                Total 30    Blank areas are intentional and mean the treatment did not include these items.       Mildred Gee, PT, DPT  6/26/2019  "

## 2021-05-30 NOTE — PROGRESS NOTES
[facility] Daily Progress     Patient Name: Kat Diego  Date: 7/18/2019   Visit #: 7/8   PTA visit #:   Referral Diagnosis: Right-sided thoracic back pain, unspecified chronicity [M54.6]  - Primary   Referring provider: Kimmie Cain MD  Visit Diagnosis:     ICD-10-CM    1. Pain in thoracic spine M54.6    2. Generalized muscle weakness M62.81    3. Poor posture R29.3      Kat Diego is a 73 y.o. female who presents to therapy today with chief complaints of right sided thoracic/rib pain since March 2019 without specific injury. Pain is located along right thoracic/flank along floating ribs. Pain is aggravated by standing for long periods, transitions, repetitive movements causing mm spasms. Evaluation reveals: restrictions in lumbar extension and R SB with pain, postural deficits, weakness, + facet hypomobility and myofascial tightness R flank. S/s appear consistent with R sided facet pain and hypomobility with radiation along ribs. Patient will benefit from 1:1 skilled physical therapy services to address the above limitations.     Assessment:     Patient demonstrates overall improvements in her pain level and function since beginning PT.  She reports a 50-60% improvement overall.  The patient was able to tolerate treatment well and felt improvements in mobility afterwards.  She may be appropriate to continue beyond 8 visits as she is making progress and does require skilled manual treatments.  She is therefore appropriate to continue with skilled PT services at this time.    Patient is benefitting from skilled physical therapy and is making steady progress toward functional goals.  Patient is appropriate to continue with skilled physical therapy intervention, as indicated by initial plan of care.    Goal Status:  Pt. will demonstrate/verbalize independence in self-management of condition in : 2 weeks;Progressing toward  Pt. will be independent with home exercise program in : 2  "weeks;Progressing toward    Pt will: transition supine<>sidelying or supine<>sit in the AM without increased pain to improve morning transferes in 8 weeks; Progressing toward  Pt will: stand unrestricted due to pain to improve ADL's in 8 weeks; Progressing toward  Pt will: report occurance of pain/spasms 2x/week or less to improve ADL's and recreation in 8 weeks; Progressing toward    Plan / Patient Education:     Continue with initial plan of care.  Progress with home program as tolerated.   Continue with thoracic mobilizations if helpful; postural strengthening.    Subjective:     Pain Ratin (no Advil or heating pad)   Patient reports that she still has the pain.  She can feel the spot when she does her shoulder blade exercises but it's not painful.  She has been able to go shopping for hours at a time now rather than 25 minutes or so.  Lying on the R side is still painful after she wakes, which gets to a 7/10.  The rest of her pain has come down from a 7 to a 5/10.  She'll get a twinge rather than a seizing of her pain.  She is typically sore for a day or two after treatment, but then overall her pain is better.    Objective:     Pain with lumbar extension and R side bending lumbar spine with decreased ROM.  Pain bilateral facet joints T10-T12.    Appt time: 8:32AM - 8:58AM    Treatment Today     TREATMENT MINUTES COMMENTS   Evaluation     Self-care/ Home management     Manual therapy 15 -Prone PA mobs to bilateral facet joints T10/11 and T11/12 x 30 x 3 grades II-III   Neuromuscular Re-education     Therapeutic Activity     Therapeutic Exercises 11 Exercise #1: Reach and roll   Comment #1: HEP x 5-10 reps B, hold 5\"   Exercise #2: TA- advanced during session to TA with alternating march, slow, with breathing  Comment #2: HEP x 10 reps, 2-3 sets   Exercise #3: LTR  Comment #3: HEP  Exercise #4: Bridge  Comment #4: HEP  Exercise #5: Clamshell  Comment #5: HEP  Exercise #6: Seated Cat/Cow  Comment #6: x " 5  Added seated cat/cow for thoracic mobility.   Gait training     Modality__________________                Total 26    Blank areas are intentional and mean the treatment did not include these items.       Maxine Torres, PT, DPT  7/18/2019

## 2021-05-31 NOTE — PROGRESS NOTES
[facility] Daily Progress     Patient Name: Kat Diego  Date: 8/7/2019   Visit #: 2/4 (8/8 prior to recert)   PTA visit #:   Referral Diagnosis: Right-sided thoracic back pain, unspecified chronicity [M54.6]  - Primary   Referring provider: Kimmie Cain MD  Visit Diagnosis:     ICD-10-CM    1. Pain in thoracic spine M54.6    2. Generalized muscle weakness M62.81    3. Poor posture R29.3      Initial Assessment: Kat Diego is a 73 y.o. female who presents to therapy today with chief complaints of right sided thoracic/rib pain since March 2019 without specific injury. Pain is located along right thoracic/flank along floating ribs. Pain is aggravated by standing for long periods, transitions, repetitive movements causing mm spasms. Evaluation reveals: restrictions in lumbar extension and R SB with pain, postural deficits, weakness, + facet hypomobility and myofascial tightness R flank. S/s appear consistent with R sided facet pain and hypomobility with radiation along ribs. Patient will benefit from 1:1 skilled physical therapy services to address the above limitations.     Assessment:     Patient reports a slight set back since last week, with increased soreness.  She has been compliant with her HEP and feels benefit from all exercises other than the tennis ball exercise. She thinks the tennis ball exercise is what caused her pain. She felt much benefit from manual therapy last week.  Overall, her symptoms have significantly improved since beginning skilled PT services. Treatment continues to focus on strengthening, mobility and manual therapy. She would like to continue with PT as she is making improvements, but will decrease frequency to trial independent management.    Patient is benefiting from skilled physical therapy and is making steady progress toward functional goals.  Patient is appropriate to continue with skilled physical therapy intervention, as indicated by initial plan  "of care.    Goal Status:  Pt. will demonstrate/verbalize independence in self-management of condition in : 2 weeks;Progressing toward  Pt. will be independent with home exercise program in : 2 weeks;Progressing toward    Pt will: transition supine<>sidelying or supine<>sit in the AM without increased pain to improve morning transferes in 8 weeks; Progressing toward  Pt will: stand unrestricted due to pain to improve ADL's in 8 weeks; Progressing toward  Pt will: report occurance of pain/spasms 2x/week or less to improve ADL's and recreation in 8 weeks; Progressing toward    Plan / Patient Education:     Continue with initial plan of care.  Progress with home program as tolerated.   Continue with lumbar/thoracic mobilizations if helpful (gentle grade due to patient sensitivity); postural strengthening, possible discharge    Subjective:     Pain Rating: 3/10   Patient reports the past week she has had some increase in her pain and symptoms. She reports overall improvement is still close to 70%. Side lying stretch has seemed to help but the tennis ball seemed to increase her pain and soreness.    Objective:     Pain location has moved to R side bottom two ribs.    Appt time: 11:00AM - 11:29AM    Treatment Today     TREATMENT MINUTES COMMENTS   Evaluation     Self-care/ Home management     Manual therapy 16 -L S/L upper lumbar/lower thoracic rotational mobilizations grade II; also did distraction mobs x 20 reps  -L S/L mobilizations rib mobilizations grade I-II (due to change in patients pain location)   Neuromuscular Re-education     Therapeutic Activity     Therapeutic Exercises 13 Exercise #1: Reach and roll   Comment #1: HEP x 5-10 reps B, hold 5\"   Exercise #2: TA- advanced during session to TA with alternating march, slow, with breathing  Comment #2: HEP x 10 reps, 2-3 sets   Exercise #3: LTR  Comment #3: HEP  Exercise #4: Bridge  Comment #4: HEP  Exercise #5: Clamshell  Comment #5: HEP  Exercise #6: Seated " Cat/Cow  Comment #6: HEP  Exercise #7: L S/L with towel under side and arm above head for stretch  Comment #7: 30 seconds  -Patient to lie on L side with pillow underneath and reach above head to stretch R thoracic spine  - Added education on use of tennis ball for TPR adjusted to be done on wall as tolerated and as long as pain is not increased.   Gait training     Modality__________________                Total 29    Blank areas are intentional and mean the treatment did not include these items.       Jocelin Mooney, PT, DPT  8/7/2019

## 2021-05-31 NOTE — PROGRESS NOTES
Preoperative Exam    Scheduled Procedure: Bilateral Cataract surgery left eye 9/5/2019, right eye 9/26/2019  Surgery Date:  Left eye 9/5/2019 and Right Eye 9/26/2019  Surgery Location: [facility] [place], [place], fax [phone]    Surgeon:  Dr. Pawan Fuller     Assessment/Plan:     1. Preop general physical exam    2. Cataract of both eyes, unspecified cataract type    3. Pain in thoracic spine  Continue PT  - Ambulatory referral to PT/OT    Surgical Procedure Risk: Low (reported cardiac risk generally < 1%)  Have you had prior anesthesia?: Yes  Have you or any family members had a previous anesthesia reaction:  No  Do you or any family members have a history of a clotting or bleeding disorder?: No  Cardiac Risk Assessment: no increased risk for major cardiac complications    APPROVAL GIVEN to proceed with proposed procedure, without further diagnostic evaluation      Functional Status: Independent  Patient plans to recover at home with family.     Subjective:      Kat Diego is a 73 y.o. female who presents for a preoperative consultation.  She will be having bilateral catarcact surgery left then right. She is feeling well without recent illness or fevers. No recent or remote chest pain shortness of breath or palpitations. Good exercise tolerance. Has tolerated anesthesia well with prior procedures. We discussed to hold supplements prior to surgery and continue other medications.     All other systems reviewed and are negative, other than those listed in the HPI.    Pertinent History  Do you have difficulty breathing or chest pain after walking up a flight of stairs: No  History of obstructive sleep apnea: No  Steroid use in the last 6 months: No  Frequent Aspirin/NSAID use: Yes: baby aspirin  Prior Blood Transfusion: No  Prior Blood Transfusion Reaction: No  If for some reason prior to, during or after the procedure, if it is medically indicated, would you be willing to have a blood transfusion?:   There is no transfusion refusal.    Current Outpatient Medications   Medication Sig Dispense Refill     b complex vitamins capsule Take 1 capsule by mouth daily.       calcium-vitamin D (CALCIUM-VITAMIN D) 500 mg(1,250mg) -200 unit per tablet Take 1 tablet by mouth daily.        cholecalciferol, vitamin D3, 1,000 unit tablet Take 1,000 Units by mouth daily.       CINNAMON BARK (CINNAMON ORAL) Take 1 tablet by mouth daily.        coenzyme Q10 100 mg capsule Take 100 mg by mouth daily.       GLUC/CHND/OM3/DHA/EPA/FISH/STR (GLUCOSAMINE CHONDROITIN PLUS ORAL) Take 1 tablet by mouth daily.        hydroCHLOROthiazide (HYDRODIURIL) 12.5 MG tablet Take 1 tablet (12.5 mg total) by mouth daily. 90 tablet 3     hydrocortisone 2.5 % cream APPLY TO AFFECTED AREA TWICE A DAY  11     levothyroxine (SYNTHROID, LEVOTHROID) 25 MCG tablet Take 1 tablet (25 mcg total) by mouth Daily at 6:00 am. 90 tablet 3     magnesium oxide (MAG-OX) 400 mg tablet Take 400 mg by mouth daily.       multivitamin with minerals (THERA-M) 4.5 mg iron-200 mcg Tab Take 1 split tab by mouth 2 (two) times a day.       niacin (SLO-NIACIN) 500 mg ER tablet Take 500 mg by mouth every morning.       OMEGA-3/DHA/EPA/FISH OIL (FISH OIL-OMEGA-3 FATTY ACIDS) 300-1,000 mg capsule Take 2 g by mouth bedtime.        clindamycin (CLEOCIN) 300 MG capsule Take 2 capsules by mouth. 1 hour before procedure.       dexamethasone sodium phosphate (DECADRON) 0.1 % ophthalmic solution INSTILL ONE DROP INTO AFFECTED EYE 3 TIMES DAILY  11     TiZANidine (ZANAFLEX) 2 MG capsule Take 1 capsule (2 mg total) by mouth at bedtime as needed for muscle spasms. 20 capsule 0     No current facility-administered medications for this visit.         Allergies   Allergen Reactions     Hay Fever And Allergy Relief      Penicillins Rash     Whole body itchy rash.       Patient Active Problem List   Diagnosis     Hypothyroidism     Colon polyps     Knee osteoarthritis     Acute blood loss anemia      History of left knee replacement     Essential hypertension       Past Medical History:   Diagnosis Date     Allergic rhinitis      Atopic dermatitis      Disease of thyroid gland     hypo     Diverticul disease small and large intestine, no perforati or abscess      Rosacea        Past Surgical History:   Procedure Laterality Date     COLONOSCOPY       DENTAL SURGERY       DILATION AND CURETTAGE OF UTERUS       UT TOTAL KNEE ARTHROPLASTY Left 11/25/2015    Procedure: KNEE TOTAL ARTHROPLASTY, LEFT;  Surgeon: Rolf Helton MD;  Location: [facility];  Service: Orthopedics     UT TOTAL KNEE ARTHROPLASTY Right 9/23/2016    Procedure: RIGHT KNEE TOTAL ARTHROPLASTY;  Surgeon: Rolf Helton MD;  Location: [facility];  Service: Orthopedics     TOTAL KNEE ARTHROPLASTY Left 11/25/2015    [facility]; Rolf Helton MD     WISDOM TOOTH EXTRACTION         Social History     Socioeconomic History     Marital status:      Spouse name: Not on file     Number of children: Not on file     Years of education: Not on file     Highest education level: Not on file   Occupational History     Not on file   Social Needs     Financial resource strain: Not on file     Food insecurity:     Worry: Not on file     Inability: Not on file     Transportation needs:     Medical: Not on file     Non-medical: Not on file   Tobacco Use     Smoking status: Never Smoker     Smokeless tobacco: Never Used   Substance and Sexual Activity     Alcohol use: Yes     Comment: rarely     Drug use: No     Sexual activity: Not on file   Lifestyle     Physical activity:     Days per week: Not on file     Minutes per session: Not on file     Stress: Not on file   Relationships     Social connections:     Talks on phone: Not on file     Gets together: Not on file     Attends Zoroastrianism service: Not on file     Active member of club or organization: Not on file     Attends meetings of clubs or organizations: Not on file      "Relationship status: Not on file     Intimate partner violence:     Fear of current or ex partner: Not on file     Emotionally abused: Not on file     Physically abused: Not on file     Forced sexual activity: Not on file   Other Topics Concern     Not on file   Social History Narrative    Patient is a .  She follows with a Taoist group and like minded ladies for healthy lifestyles and dietary modifications.        Kimmie Cain MD  6/2/2019           Patient Care Team:  Kimmie Cain MD as PCP - General (Family Medicine)          Objective:     Vitals:    08/29/19 1112   BP: 147/74   Pulse: 80   Resp: 16   Temp: 98.5  F (36.9  C)   TempSrc: Oral   Weight: 184 lb 8 oz (83.7 kg)   Height: 5' 6\" (1.676 m)   LMP: 08/12/1993          Physical Exam:   /74 (Patient Site: Left Arm, Patient Position: Sitting, Cuff Size: Adult Large)   Pulse 80   Temp 98.5  F (36.9  C) (Oral)   Resp 16   Ht 5' 6\" (1.676 m)   Wt 184 lb 8 oz (83.7 kg)   LMP 08/12/1993   BMI 29.78 kg/m    General appearance: alert, appears stated age and cooperative  Head: Normocephalic, without obvious abnormality, atraumatic  Eyes: conjunctivae/corneas clear. PERRL, EOM's intact. Fundi benign.  Ears: normal TM's and external ear canals both ears  Nose: Nares normal. Septum midline. Mucosa normal. No drainage or sinus tenderness.  Throat: lips, mucosa, and tongue normal; teeth and gums normal  Lungs: clear to auscultation bilaterally  Heart: regular rate and rhythm, S1, S2 normal, no murmur, click, rub or gallop  Extremities: extremities normal, atraumatic, no cyanosis or edema  Pulses: 2+ and symmetric  Neurologic: Grossly normal  Psychologic: Mood and affect normal.      There are no Patient Instructions on file for this visit.    Labs:  No labs were ordered during this visit    Immunization History   Administered Date(s) Administered     Influenza high dose,seasonal,PF, 65+ yrs 09/07/2016     Influenza, inj, historic,unspecified " 11/04/2015     Pneumo Conj 13-V (2010&after) 11/04/2015     Pneumo Polysac 23-V 09/07/2016     Tdap 10/15/2015     ZOSTER, LIVE 01/21/2007     ZOSTER, RECOMBINANT, IM 01/16/2019, 04/10/2019           Electronically signed by MARIANO Diaz 08/29/19 11:07 AM

## 2021-05-31 NOTE — PROGRESS NOTES
[facility] Daily Progress     Patient Name: Kat Diego  Date: 8/28/2019   Visit #: 3/4 (8/8 prior to recert)   PTA visit #:   Referral Diagnosis: Right-sided thoracic back pain, unspecified chronicity [M54.6]  - Primary   Referring provider: Kimmie Cain MD  Visit Diagnosis:     ICD-10-CM    1. Pain in thoracic spine M54.6    2. Generalized muscle weakness M62.81    3. Poor posture R29.3      Initial Assessment: Kat Diego is a 73 y.o. female who presents to therapy today with chief complaints of right sided thoracic/rib pain since March 2019 without specific injury. Pain is located along right thoracic/flank along floating ribs. Pain is aggravated by standing for long periods, transitions, repetitive movements causing mm spasms. Evaluation reveals: restrictions in lumbar extension and R SB with pain, postural deficits, weakness, + facet hypomobility and myofascial tightness R flank. S/s appear consistent with R sided facet pain and hypomobility with radiation along ribs. Patient will benefit from 1:1 skilled physical therapy services to address the above limitations.     Assessment:     Patient presents to follow up appointment for right sided thoracic/rib pain. She is discouraged as her pain has come back at times throughout the past couple weeks. She is hoping to continue therapy and planning on trying to contact the doctor for another referral to continue.   She has been compliant with her HEP and feels benefit from all exercises. The pain comes on as a spasm when she is on her feet walking and feels as if the only limitation at this time is walking for long times. She is hoping to be able to walk without pain.  Overall, her symptoms have significantly improved since beginning skilled PT services. Treatment continues to focus on strengthening, mobility and manual therapy. She would like to continue with PT as she is making improvements, but will decrease frequency to continue  "trial independent management.     Patient is benefiting from skilled physical therapy and is making steady progress toward functional goals.  Patient is appropriate to continue with skilled physical therapy intervention, as indicated by initial plan of care.  Will appreciate doctor recommendation.    Goal Status:  Pt. will demonstrate/verbalize independence in self-management of condition in : 2 weeks;Progressing toward  Pt. will be independent with home exercise program in : 2 weeks;Progressing toward    Pt will: transition supine<>sidelying or supine<>sit in the AM without increased pain to improve morning transferes in 8 weeks; Progressing toward  Pt will: stand unrestricted due to pain to improve ADL's in 8 weeks; Progressing toward  Pt will: report occurance of pain/spasms 2x/week or less to improve ADL's and recreation in 8 weeks; Progressing toward    Plan / Patient Education:     Continue with initial plan of care.  Progress with home program as tolerated.   Continue with lumbar/thoracic mobilizations if helpful (gentle grade due to patient sensitivity); postural strengthening, possible discharge, ask about goals and standing exercise    Subjective:     Pain Rating: no pain right now or today    standing and shopping get a spasm.   Patient reports after about one week of not coming it got bad again, she is \"almost back to square one\". Able to still do functional things she has improved but the pain is back. Good days and bad days present. The back spasm have occurred when walking for long at grocery store or when out and about. Would like to be able to walk more. She notices foot wear makes a big difference. Patient is seeing TARYN De Oliveira tomorrow hoping to get a continued order for PT.    Objective:     Only tender to palpation on R side paraspinal low thoracic spine    Appt time: 10:00AM - 10:29AM    Treatment Today     TREATMENT MINUTES COMMENTS   Evaluation     Self-care/ Home management     Manual " "therapy 16 -L S/L upper lumbar/lower thoracic rotational mobilizations grade II; also did distraction mobs x 20 reps  -L S/L mobilizations rib mobilizations grade I-II (due to change in patients pain location)   Neuromuscular Re-education     Therapeutic Activity     Therapeutic Exercises 13 Exercise #1: Reach and roll   Comment #1: HEP x 5-10 reps B, hold 5\"   Exercise #2: TA- advanced during session to TA with alternating march, slow, with breathing  Comment #2: HEP x 10 reps, 2-3 sets   Exercise #3: LTR  Comment #3: HEP  Exercise #4: Bridge  Comment #4: HEP  Exercise #5: Clamshell  Comment #5: HEP  Exercise #6: Seated Cat/Cow  Comment #6: HEP  Exercise #7: L S/L with towel under side and arm above head for stretch  Comment #7: 30 seconds  Exercise #8: Standing marching x10bil  Comment #8: Standing hip abduction x10bil  -Patient to lie on L side with pillow underneath and reach above head to stretch R thoracic spine  - Added standing exercise to target standing tolerance and strength as patient is only getting pain after standing or walking for a while.   Gait training     Modality__________________                Total 29    Blank areas are intentional and mean the treatment did not include these items.       Jocelin Mooney, PT, DPT  8/28/2019  "

## 2021-06-01 VITALS — WEIGHT: 178.3 LBS | BODY MASS INDEX: 28.78 KG/M2

## 2021-06-01 VITALS — WEIGHT: 180 LBS | BODY MASS INDEX: 29.05 KG/M2

## 2021-06-01 NOTE — PROGRESS NOTES
[facility] Re-Certification Request    September 18, 2019      Patient: Kat Diego  MR Number: [id number]  YOB: 1945  Date of Visit: 9/18/2019  Onset Date:  March 2019  Date of Eval: 6/11/2019    Dear Alexa Redd,    As you may recall, we have been seeing Kat Diego for Physical Therapy of pain in thoracic spine.    For therapy to continue, Medicare and/or Medicaid requires periodic physician review of the treatment plan. Please review the summary of the patient's progress and our plan for continued therapy, and verify  that you agree therapy should continue by entering certification dates at the bottom of this note and co-signing this note.    Plan of Care  Authorization / Certification Start Date: 09/30/19  Authorization / Certification End Date: 12/30/19  Authorization / Certification Number of Visits: 4  Communication with: Referral Source  Patient Related Instruction: Nature of Condition;Treatment plan and rationale;Self Care instruction;Basis of treatment;Body mechanics;Posture;Precautions;Next steps;Expected outcome  Times per Week: 1  Number of Weeks: 2-4  Number of Visits: 2-4  Discharge Planning: Patient will discharge when physical therapy goals are met and patient is confident in independent management of condition.  Therapeutic Exercise: ROM;Stretching;Strengthening  Neuromuscular Reeducation: posture;core  Manual Therapy: joint mobilization;soft tissue mobilization      Goal  Pt. will demonstrate/verbalize independence in self-management of condition in : 4 weeks  Pt. will be independent with home exercise program in : 4 weeks    Pt will: be able to walk for 20+min without an increase in back pain or spasm, within 4 weeks.  Pt will: stand unrestricted due to pain to improve ADL's in 4 weeks; goal met up to 20min (patient encouraged)  Pt will: report occurance of pain/spasms 2x/week or less to improve ADL's and recreation in 8 weeks; Goal met for 2x a week or less,  progress to 1x/ week or less        If you have any questions or concerns, please don't hesitate to call.    Sincerely,      Jocelin Mooney, PT,DPT        Physician recommendation:     ___ Follow therapist's recommendation        ___ Modify therapy      *Physician co-signature indicates they certify the need for these services furnished within this plan and while under their care.    [facility] Assessment and Progress     Patient Name: Kat Diego  Date: 9/18/2019   Visit #: 1/4 (12/12 prior to recert)   PTA visit #:   Referral Diagnosis: Right-sided thoracic back pain, unspecified chronicity [M54.6]  - Primary   Referring provider: Kimmie Cain MD  Visit Diagnosis:     ICD-10-CM    1. Pain in thoracic spine M54.6    2. Generalized muscle weakness M62.81    3. Poor posture R29.3      Initial Assessment: Kat Diego is a 73 y.o. female who presents to therapy today with chief complaints of right sided thoracic/rib pain since March 2019 without specific injury. Pain is located along right thoracic/flank along floating ribs. Pain is aggravated by standing for long periods, transitions, repetitive movements causing mm spasms. Evaluation reveals: restrictions in lumbar extension and R SB with pain, postural deficits, weakness, + facet hypomobility and myofascial tightness R flank. S/s appear consistent with R sided facet pain and hypomobility with radiation along ribs. Patient will benefit from 1:1 skilled physical therapy services to address the above limitations.     Assessment:     Patient presents to follow up appointment for right sided thoracic/rib pain. Patient has been benefiting from physical therapy, with great improvement and progress with performance of HEP focusing on strengthening and mobility. She is hoping to continue therapy as she feels benefit and would like a couple more appointments to ensure she is on the right track towards managing her pain. Patient reports doctor  agrees to this at this time.  She has been compliant with her HEP and feels benefit from all exercises. The pain greatly improved from starting therapy with the only limitation at this time is walking for longer distances (20min+) while running errands or walking for exercise. She is hoping to be able to walk without pain.  Overall, her symptoms have significantly improved since beginning skilled PT services. Goals were adjusted as appropriate.Treatment continues to focus on strengthening, mobility and manual therapy. She would like to continue with PT as she is making improvements and will follow up in three weeks as she trials independent management and presents for progression and checking of goals.      Patient is benefiting from skilled physical therapy and is making steady progress toward functional goals.  Patient is appropriate to continue with skilled physical therapy intervention, as indicated by initial plan of care.  Doctor recommendation to continue for up to 4 appointments.     Goal Status:  Pt. will demonstrate/verbalize independence in self-management of condition in : 4 weeks  Pt. will be independent with home exercise program in : 4 weeks    Pt will: be able to walk for 20+min without an increase in back pain or spasm, within 4 weeks.  Pt will: stand unrestricted due to pain to improve ADL's in 4 weeks; goal met up to 20min (patient encouraged)  Pt will: report occurance of pain/spasms 2x/week or less to improve ADL's and recreation in 8 weeks; Goal met for 2x a week or less, progress to 1x/ week or less      Plan / Patient Education:     Continue with updated plan of care.  Progress with home program as tolerated.     Plan for next session: manual as needed, ensure correct performance of exercise, assess goals and discharge as appropriate    Subjective:     Pain Rating: very minimal, ache still there no pain right now or today    Patient presents to physical therapy appointment reporting continued  "improvement in thoracic back pain over the past three weeks. She reports only getting back spasms when walking for 15-20minutes while at the store with this occurring about 2x/week. She has felt great benefit in exercises and physical therapy treatment. She requested 4 more approved appointments for doctor for exercise progression and to ensure she is on the right track to meet her goals. She will plan on only using 2 if able. She has felt more confident in her standing with no floating rib pain present at this time. She is encouraged by her progress and wants it to continue.    Objective:     Only tender to palpation on R side paraspinal mid to low thoracic spine    ROM not limited on this date    Assessed goals- see above    Appt time: 2:00-2:39    Treatment Today     TREATMENT MINUTES COMMENTS   Evaluation     Self-care/ Home management     Manual therapy 15 -L S/L mid/lower thoracic rotational mobilizations grade II, cross friction to area of increased tissue tension in paraspinal mid-lower thoracic spine.   Neuromuscular Re-education     Therapeutic Activity     Therapeutic Exercises 15 Exercise #1: Reach and roll   Comment #1: HEP x 5-10 reps B, hold 5\"   Exercise #2: TA- advanced during session to TA with alternating march, slow, with breathing  Comment #2: HEP x 10 reps, 2-3 sets   Exercise #3: LTR  Comment #3: HEP  Exercise #4: Bridge  Comment #4: HEP  Exercise #5: Clamshell  Comment #5: HEP  Exercise #6: Seated Cat/Cow  Comment #6: HEP  Exercise #7: L S/L with towel under side and arm above head for stretch  Comment #7: 30 seconds  Exercise #8: Standing marching x10bil  Comment #8: Standing hip abduction x10bil  Exercise #9: Scapula retraction rows x10 L2 band (L3 band provided for progression)  -Patient to lie on L side with pillow underneath and reach above head to stretch R thoracic spine  - Added standing exercise to target standing tolerance and strength as patient is only getting pain after standing " or walking for a while.   Gait training     Modality__________________     Physical Performance Test 9 Reassessed patients goals and outcome measures taken.         Total 39    Blank areas are intentional and mean the treatment did not include these items.       Jocelin Mooney, PT, DPT  9/18/2019

## 2021-06-02 VITALS — WEIGHT: 177 LBS | HEIGHT: 66 IN | BODY MASS INDEX: 28.45 KG/M2

## 2021-06-02 NOTE — PROGRESS NOTES
[facility] Assessment and Progress     Patient Name: Kat Diego  Date: 10/9/2019   Visit #: 2/4 (12/12 prior to recert)   PTA visit #:   Referral Diagnosis: Right-sided thoracic back pain, unspecified chronicity [M54.6]  - Primary   Referring provider: Kimmei Cain MD  Visit Diagnosis:     ICD-10-CM    1. Pain in thoracic spine M54.6    2. Generalized muscle weakness M62.81    3. Poor posture R29.3      Initial Assessment: Kat Diego is a 73 y.o. female who presents to therapy today with chief complaints of right sided thoracic/rib pain since March 2019 without specific injury. Pain is located along right thoracic/flank along floating ribs. Pain is aggravated by standing for long periods, transitions, repetitive movements causing mm spasms. Evaluation reveals: restrictions in lumbar extension and R SB with pain, postural deficits, weakness, + facet hypomobility and myofascial tightness R flank. S/s appear consistent with R sided facet pain and hypomobility with radiation along ribs. Patient will benefit from 1:1 skilled physical therapy services to address the above limitations.     Assessment:     Patient presents to follow up appointment for right sided thoracic/rib pain. Patient has noted that she is continuing to benefit from exercise and activity recommendations and has had no increase in pain since last seen. At this point she is still having back spasm on average 4 days a week, but it is not limiting what she can do functionally as she knows how to manage it.  Patient is progressing towards goals and has met some goals through therapy. Will trial independent management at this time. Educated on when to call or come back in as needed, patient agreed to POC.    Patient is benefiting from skilled physical therapy and is making steady progress toward functional goals.     Goal Status:  Pt. will demonstrate/verbalize independence in self-management of condition in : 4  "weeks;Met  Pt. will be independent with home exercise program in : 4 weeks;Met    Pt will: be able to walk for 20+min without an increase in back pain or spasm, within 4 weeks.- Goal MET  Pt will: stand unrestricted due to pain to improve ADL's in 4 weeks; goal met up to 20min (patient encouraged)  Pt will: report occurance of pain/spasms 2x/week or less to improve ADL's and recreation in 8 weeks; progressing towards goals  Able to walk as long as want no limitation after about 35min spasm, 4 bad days a week at this point with spasms per week.  Progressing towards goals    Plan / Patient Education:     Continue with updated plan of care.  Progress with home program as tolerated.     Trial independent management.  Subjective:     Pain Rating: very minimal, ache still there no pain right now or today      Patient reports there are good days and bad days. Today is a great day ,no pain or spasms present. She is now sleeping on the R side and is able to roll around with no problem. She has found it works well to do exercises before getting up.Patient is encouraged that even though she has good and bad days, in general nothing has gotten worse for a long time. Would like to trial independent management      Objective:       Modified Oswestry Low Back Pain Disablity Questionnaire  in %: 16  In June 20%    Non tender to palpation on this date    ROM not limited on this date    Assessed goals- see above    Appt time: 2:00-2:39    Treatment Today     TREATMENT MINUTES COMMENTS   Evaluation     Self-care/ Home management     Manual therapy     Neuromuscular Re-education     Therapeutic Activity     Therapeutic Exercises 15 Exercise #1: Reach and roll   Comment #1: HEP x 5-10 reps B, hold 5\"   Exercise #2: TA- advanced during session to TA with alternating march, slow, with breathing  Comment #2: HEP x 10 reps, 2-3 sets   Exercise #3: LTR  Comment #3: HEP  Exercise #4: Bridge  Comment #4: HEP  Exercise #5: Clamshell  Comment " #5: HEP  Exercise #6: Seated Cat/Cow  Comment #6: HEP  Exercise #7: L S/L with towel under side and arm above head for stretch  Comment #7: 30 seconds  Exercise #8: Standing marching x10bil  Comment #8: Standing hip abduction x10bil  Exercise #9: Scapula retraction rows x10 L2 band (L3 band provided for progression)  Reviewed and ensured understanding  Educated on progress and trial of independent management.   Gait training     Modality__________________     Physical Performance Test 9 Reassessed patients goals and outcome measures taken.         Total 24    Blank areas are intentional and mean the treatment did not include these items.       Jocelin Mooney, PT, DPT  10/9/2019

## 2021-06-03 VITALS — WEIGHT: 181.38 LBS | BODY MASS INDEX: 29.27 KG/M2

## 2021-06-03 VITALS — WEIGHT: 184.5 LBS | BODY MASS INDEX: 29.65 KG/M2 | HEIGHT: 66 IN

## 2021-06-03 VITALS — BODY MASS INDEX: 29.34 KG/M2 | WEIGHT: 181.8 LBS

## 2021-06-04 VITALS
DIASTOLIC BLOOD PRESSURE: 68 MMHG | SYSTOLIC BLOOD PRESSURE: 149 MMHG | RESPIRATION RATE: 16 BRPM | WEIGHT: 180.6 LBS | BODY MASS INDEX: 29.02 KG/M2 | OXYGEN SATURATION: 98 % | HEART RATE: 81 BPM | HEIGHT: 66 IN

## 2021-06-05 VITALS
RESPIRATION RATE: 16 BRPM | OXYGEN SATURATION: 98 % | WEIGHT: 184.2 LBS | HEART RATE: 100 BPM | DIASTOLIC BLOOD PRESSURE: 77 MMHG | SYSTOLIC BLOOD PRESSURE: 131 MMHG | BODY MASS INDEX: 29.85 KG/M2

## 2021-06-05 VITALS
BODY MASS INDEX: 30.7 KG/M2 | DIASTOLIC BLOOD PRESSURE: 79 MMHG | WEIGHT: 191 LBS | SYSTOLIC BLOOD PRESSURE: 133 MMHG | TEMPERATURE: 97.7 F | HEART RATE: 58 BPM | HEIGHT: 66 IN

## 2021-06-05 NOTE — TELEPHONE ENCOUNTER
RN cannot approve Refill Request    RN can NOT refill this medication Protocol failed and NO refill given.       Annalisa Finley, [facility] Triage/Med Refill 1/22/2020    Requested Prescriptions   Pending Prescriptions Disp Refills     levothyroxine (SYNTHROID, LEVOTHROID) 25 MCG tablet 90 tablet 3     Sig: Take 1 tablet (25 mcg total) by mouth Daily at 6:00 am.       Thyroid Hormones Protocol Failed - 1/22/2020  5:42 PM        Failed - TSH on file in past 12 months for patient age 12 & older     TSH   Date Value Ref Range Status   01/16/2019 3.76 0.30 - 5.00 uIU/mL Final                   Passed - Provider visit in past 12 months or next 3 months     Last office visit with prescriber/PCP: 5/28/2019 Kimmie Cain MD OR same dept: 5/28/2019 Kimmie Cain MD OR same specialty: 5/28/2019 Kimmie Cain MD  Last physical: Visit date not found Last MTM visit: Visit date not found   Next visit within 3 mo: Visit date not found  Next physical within 3 mo: Visit date not found  Prescriber OR PCP: Kimmie Cain MD  Last diagnosis associated with med order: 1. Hypothyroidism, unspecified type  - levothyroxine (SYNTHROID, LEVOTHROID) 25 MCG tablet; Take 1 tablet (25 mcg total) by mouth Daily at 6:00 am.  Dispense: 90 tablet; Refill: 3    2. Essential hypertension  - hydroCHLOROthiazide (HYDRODIURIL) 12.5 MG tablet; Take 1 tablet (12.5 mg total) by mouth daily.  Dispense: 90 tablet; Refill: 3    If protocol passes may refill for 12 months if within 3 months of last provider visit (or a total of 15 months).             hydroCHLOROthiazide (HYDRODIURIL) 12.5 MG tablet 90 tablet 3     Sig: Take 1 tablet (12.5 mg total) by mouth daily.       Diuretics/Combination Diuretics Refill Protocol  Failed - 1/22/2020  5:42 PM        Failed - Serum Potassium in past 12 months      No results found for: LN-POTASSIUM          Failed - Serum Sodium in past 12 months      No results found for: LN-SODIUM          Failed -  Serum Creatinine in past 12 months      Creatinine   Date Value Ref Range Status   01/16/2019 0.69 0.60 - 1.10 mg/dL Final             Passed - Visit with PCP or prescribing provider visit in past 12 months     Last office visit with prescriber/PCP: 5/28/2019 Kimmie Cain MD OR same dept: 5/28/2019 Kimmie Cain MD OR same specialty: 5/28/2019 Kimmie Cain MD  Last physical: Visit date not found Last MTM visit: Visit date not found   Next visit within 3 mo: Visit date not found  Next physical within 3 mo: Visit date not found  Prescriber OR PCP: Kimmie Cain MD  Last diagnosis associated with med order: 1. Hypothyroidism, unspecified type  - levothyroxine (SYNTHROID, LEVOTHROID) 25 MCG tablet; Take 1 tablet (25 mcg total) by mouth Daily at 6:00 am.  Dispense: 90 tablet; Refill: 3    2. Essential hypertension  - hydroCHLOROthiazide (HYDRODIURIL) 12.5 MG tablet; Take 1 tablet (12.5 mg total) by mouth daily.  Dispense: 90 tablet; Refill: 3    If protocol passes may refill for 12 months if within 3 months of last provider visit (or a total of 15 months).             Passed - Blood pressure on file in past 12 months     BP Readings from Last 1 Encounters:   08/29/19 147/74

## 2021-06-06 NOTE — PROGRESS NOTES
[facility] Discharge Summary  Patient Name: Kat Diego  Date: 2/18/2020  Referral Diagnosis: right sided thoracic back pain, unspecified  Referring provider: Kimmie Cain MD  Visit Diagnosis:   1. Pain in thoracic spine     2. Generalized muscle weakness     3. Poor posture          Goals:  Pt. will demonstrate/verbalize independence in self-management of condition in : 4 weeks;Met  Pt. will be independent with home exercise program in : 4 weeks;Met     Pt will: be able to walk for 20+min without an increase in back pain or spasm, within 4 weeks.- Goal MET  Pt will: stand unrestricted due to pain to improve ADL's in 4 weeks; goal met up to 20min - goal MET  Pt will: report occurance of pain/spasms 2x/week or less to improve ADL's and recreation in 8 weeks; progressing towards goals  Able to walk as long as want no limitation after about 35min spasm, 4 bad days a week at this point with spasms per week.    Patient was seen for 14 visits from 6/11/2019 to 10/9/2019 with no missed appointments.  The patient met goals and has demonstrated understanding of and independence in the home program for self-care, and progression to next steps.  She will initiate contact if questions or concerns arise.    Therapy will be discontinued at this time.  The patient will need a new referral to resume.    Thank you for your referral.  Jocelin Mooney, PT, DPT  2/18/2020  7:42 AM

## 2021-06-06 NOTE — PROGRESS NOTES
Assessment and Plan:     1. Routine general medical examination at a health care facility  Lipid [place]    Comprehensive Metabolic Panel   2. Screen for colon cancer     3. Hypercalcemia  Parathyroid Hormone Intact    Vitamin D, Total (25-Hydroxy)   4. Essential hypertension  Comprehensive Metabolic Panel   5. Hypothyroidism, unspecified type  Thyroid [place]         The patient's current medical problems were reviewed.  Noting elevated calcium, will check parathyroid and vitamin D.  She does have osteopenia and her hip fracture is high.  Will refer her to endocrinology if needed.    I have had an Advance Directives discussion with the patient.  The following health maintenance schedule was reviewed with the patient and provided in printed form in the after visit summary:   Health Maintenance   Topic Date Due     MEDICARE ANNUAL WELLNESS VISIT  03/09/2021     FALL RISK ASSESSMENT  03/09/2021     DXA SCAN  06/07/2021     LIPID  02/02/2023     COLORECTAL CANCER SCREENING  10/15/2023     ADVANCE CARE PLANNING  01/16/2024     TD 18+ HE  10/15/2025     PNEUMOCOCCAL IMMUNIZATION 65+ LOW/MEDIUM RISK  Completed     INFLUENZA VACCINE RULE BASED  Completed     ZOSTER VACCINES  Completed        Subjective:     Chief Complaint   Patient presents with     Annual Wellness Visit     Fasting labs- No concerns     Chief Complaint: Kat Diego is an 74 y.o. female here for an Annual Wellness visit.   HPI: She feels well.  I discussed her previously elevated calcium.  She is not aware about this.  Otherwise she takes her other medications regularly.  She does admit to taking calcium and vitamin D daily for her osteopenia.    1- HTN:She indicates that she is feeling well and   denies any symptoms referable to her elevated blood pressure.   Specifically denies chest pain, palpitations, dyspnea, orthopnea,   PND or peripheral edema    2- Hypothyroidism: Feels normal energy levels and appetite.  Weight has been stable    Review  of Systems:    Please see above.  The rest of the review of systems are negative for all systems.    Patient Care Team:  Kimmie Cain MD as PCP - General (Family Medicine)  Kimmie Cain MD as Assigned PCP     Patient Active Problem List   Diagnosis     Hypothyroidism     Colon polyps     Knee osteoarthritis     Acute blood loss anemia     History of left knee replacement     Essential hypertension     Past Medical History:   Diagnosis Date     Allergic rhinitis      Atopic dermatitis      Disease of thyroid gland     hypo     Diverticul disease small and large intestine, no perforati or abscess      Rosacea       Past Surgical History:   Procedure Laterality Date     DENTAL SURGERY       DILATION AND CURETTAGE OF UTERUS       TN TOTAL KNEE ARTHROPLASTY Left 2015    Procedure: KNEE TOTAL ARTHROPLASTY, LEFT;  Surgeon: Rolf Helton MD;  Location: [facility] Main OR;  Service: Orthopedics     TN TOTAL KNEE ARTHROPLASTY Right 2016    Procedure: RIGHT KNEE TOTAL ARTHROPLASTY;  Surgeon: Rolf Helton MD;  Location: [facility] OR;  Service: Orthopedics     TOTAL KNEE ARTHROPLASTY Left 2015    [facility]; Rolf Helton MD     WISDOM TOOTH EXTRACTION        Family History   Problem Relation Age of Onset     Heart disease Mother      Stroke Mother      Colon cancer Father      Hyperlipidemia Sister      Hypertension Sister      Heart disease Brother 60        Massive MI,  in sleep      Social History     Socioeconomic History     Marital status:      Spouse name: Not on file     Number of children: Not on file     Years of education: Not on file     Highest education level: Not on file   Occupational History     Not on file   Social Needs     Financial resource strain: Not on file     Food insecurity     Worry: Not on file     Inability: Not on file     Transportation needs     Medical: Not on file     Non-medical: Not on file   Tobacco Use     Smoking status:  Never Smoker     Smokeless tobacco: Never Used   Substance and Sexual Activity     Alcohol use: Yes     Comment: rarely     Drug use: No     Sexual activity: Not on file   Lifestyle     Physical activity     Days per week: Not on file     Minutes per session: Not on file     Stress: Not on file   Relationships     Social connections     Talks on phone: Not on file     Gets together: Not on file     Attends Confucianism service: Not on file     Active member of club or organization: Not on file     Attends meetings of clubs or organizations: Not on file     Relationship status: Not on file     Intimate partner violence     Fear of current or ex partner: Not on file     Emotionally abused: Not on file     Physically abused: Not on file     Forced sexual activity: Not on file   Other Topics Concern     Not on file   Social History Narrative    Patient is a .  She follows with a Buddhism group and like minded ladies for healthy lifestyles and dietary modifications.        Kimmie Cain MD  6/2/2019            The 10-year ASCVD risk score (Sheela GONZALEZ Jr., et al., 2013) is: 30.5%      Values used to calculate the score:        Age: 74 years        Sex: Female        Is Non- : No        Diabetic: No        Tobacco smoker: No        Systolic Blood Pressure: 149 mmHg        Is BP treated: Yes        HDL Cholesterol: 64 mg/dL        Total Cholesterol: 212 mg/dL      Current Outpatient Medications   Medication Sig Dispense Refill     b complex vitamins capsule Take 1 capsule by mouth daily.       calcium-vitamin D (CALCIUM-VITAMIN D) 500 mg(1,250mg) -200 unit per tablet Take 1 tablet by mouth daily.        cholecalciferol, vitamin D3, 1,000 unit tablet Take 1,000 Units by mouth daily.       CINNAMON BARK (CINNAMON ORAL) Take 1 tablet by mouth daily.        coenzyme Q10 100 mg capsule Take 100 mg by mouth daily.       GLUC/CHND/OM3/DHA/EPA/FISH/STR (GLUCOSAMINE CHONDROITIN PLUS ORAL) Take 1 tablet by  "mouth daily.        hydroCHLOROthiazide (HYDRODIURIL) 12.5 MG tablet Take 1 tablet (12.5 mg total) by mouth daily. 90 tablet 3     levothyroxine (SYNTHROID, LEVOTHROID) 25 MCG tablet Take 1 tablet (25 mcg total) by mouth Daily at 6:00 am. 90 tablet 0     magnesium oxide (MAG-OX) 400 mg tablet Take 325 mg by mouth daily. Powder form- dissolves in water- daily       multivitamin with minerals (THERA-M) 4.5 mg iron-200 mcg Tab Take 1 split tab by mouth 2 (two) times a day.       niacin (SLO-NIACIN) 500 mg ER tablet Take 500 mg by mouth every morning.       OMEGA-3/DHA/EPA/FISH OIL (FISH OIL-OMEGA-3 FATTY ACIDS) 300-1,000 mg capsule Take 2 g by mouth bedtime.        clindamycin (CLEOCIN) 300 MG capsule Take 2 capsules by mouth. 1 hour before procedure.       dexamethasone sodium phosphate (DECADRON) 0.1 % ophthalmic solution INSTILL ONE DROP INTO AFFECTED EYE 3 TIMES DAILY  11     hydrocortisone 2.5 % cream APPLY TO AFFECTED AREA TWICE A DAY  11     No current facility-administered medications for this visit.       Objective:   Vital Signs:   Visit Vitals  /68 (Patient Site: Left Arm, Patient Position: Sitting, Cuff Size: Adult Large)   Pulse 81   Resp 16   Ht 5' 5.87\" (1.673 m)   Wt 180 lb 9.6 oz (81.9 kg)   LMP 08/12/1993   SpO2 98%   BMI 29.27 kg/m         VisionScreening:  No exam data present     PHYSICAL EXAM    General Appearance: healthy, alert, oriented and no distress  HEENT Exam: Oropharynx clear without lesion or exudate  Neck:  supple, no adenopathy, thyroid normal  Heart: Normal heart sounds, S1 and S2, No murmurs.  Lungs: Normal breath sounds, Clear to auscultation bilateral  Skin exam: No visible or palpable abnormalities  Neurological exam: gait normal, alert and oriented X 3  Hem/Lymph/Immuno exam: negative findings:  no cervical nodes, no supraclavicular nodes,      Assessment Results 3/9/2020   Activities of Daily Living No help needed   Instrumental Activities of Daily Living No help needed "   Get Up and Go Score -   Mini Cog Total Score 5   Some recent data might be hidden     A Mini-Cog score of 0-2 suggests the possibility of dementia, score of 3-5 suggests no dementia    Identified Health Risks:     Patient's advanced directive was discussed and I am comfortable with the patient's wishes.

## 2021-06-06 NOTE — PATIENT INSTRUCTIONS - HE
Advance Directive  Patient s advance directive was discussed and I am comfortable with the patient s wishes.  Patient Education   Personalized Prevention Plan  You are due for the preventive services outlined below.  Your care team is available to assist you in scheduling these services.  If you have already completed any of these items, please share that information with your care team to update in your medical record.  Health Maintenance   Topic Date Due     MEDICARE ANNUAL WELLNESS VISIT  03/09/2021     FALL RISK ASSESSMENT  03/09/2021     DXA SCAN  06/07/2021     LIPID  02/02/2023     COLORECTAL CANCER SCREENING  10/15/2023     ADVANCE CARE PLANNING  01/16/2024     TD 18+ HE  10/15/2025     PNEUMOCOCCAL IMMUNIZATION 65+ LOW/MEDIUM RISK  Completed     INFLUENZA VACCINE RULE BASED  Completed     ZOSTER VACCINES  Completed

## 2021-06-06 NOTE — TELEPHONE ENCOUNTER
Authorizing: Kimmie Cain MD in [facility] FAMILY [facility]ICINE/OB             Diagnosis: Hypercalcemia [E83.52]     Okay with [facility] or should the patient be seen sooner with [facility] or [facility]?

## 2021-06-07 NOTE — TELEPHONE ENCOUNTER
Requested Prescriptions     Pending Prescriptions Disp Refills     levothyroxine (SYNTHROID, LEVOTHROID) 25 MCG tablet 90 tablet 0     Sig: Take 1 tablet (25 mcg total) by mouth Daily at 6:00 am.           Ref Range & Units  3/9/20 0936     TSH  0.30 - 5.00 uIU/mL  4.63        Last OV:  3/9/2020

## 2021-06-08 NOTE — PROGRESS NOTES
Subjective: Kat Diego is a 71 y.o. year old female who presents with sinus pain/pressure and cough. Symptoms started 3 weeks ago initially with fever, rhinorrhea, nasal congestion, cough.  Now symptoms continue with frontal sinus pain/pressure, headache, postnasal drip, dry cough with spells, occasional wheezing in lungs home treatment of symptoms include Sudafed, Delsym, Robitussin, Marielle-[place] without any significant relief of symptoms.  Has never had a sinus infection before.      ROS  General: No weight changes, fatigue, weaknesses, chills, fever, night sweats.  Ears: No hearing loss, tinnitus, vertigo (spinning), discharge, ear pain, ear pressure or fullness.  Nose/Sinuses: No  sneezing, itching, allergy, epistaxis.  Mouth/throat/neck: No bleeding gums, hoarseness, sore throat, swollen neck.  Resp: Denies shortness of breath, wheezing, sputum, hemoptysis, or pain with respiration.     History   Smoking Status     Never Smoker   Smokeless Tobacco     Not on file     Patient Active Problem List   Diagnosis     Hypothyroidism     Colon polyps     Knee osteoarthritis     Disease of thyroid gland     Acute blood loss anemia     History of left knee replacement     Past Medical History   Diagnosis Date     Allergic rhinitis      Atopic dermatitis      Disease of thyroid gland      hypo     Diverticul disease small and large intestine, no perforati or abscess      Rosacea      Current Outpatient Prescriptions on File Prior to Visit   Medication Sig Dispense Refill     b complex vitamins capsule Take 1 capsule by mouth daily.       calcium-vitamin D (CALCIUM-VITAMIN D) 500 mg(1,250mg) -200 unit per tablet Take 1 tablet by mouth daily.        cholecalciferol, vitamin D3, 1,000 unit tablet Take 1,000 Units by mouth daily.       CHROMIUM ORAL Take 1 tablet by mouth daily.        CINNAMON BARK (CINNAMON ORAL) Take 1 tablet by mouth daily.        coenzyme Q10 100 mg capsule Take 100 mg by mouth daily.        GLUC/CHND/OM3/DHA/EPA/FISH/STR (GLUCOSAMINE CHONDROITIN PLUS ORAL) Take 1 tablet by mouth daily.        levothyroxine (SYNTHROID, LEVOTHROID) 50 MCG tablet Take 1 tablet (50 mcg total) by mouth daily. 90 tablet 3     multivitamin with minerals (THERA-M) 4.5 mg iron-200 mcg Tab Take 1 split tab by mouth 2 (two) times a day.       niacin (SLO-NIACIN) 500 mg ER tablet Take 500 mg by mouth every morning.       OMEGA-3/DHA/EPA/FISH OIL (FISH OIL-OMEGA-3 FATTY ACIDS) 300-1,000 mg capsule Take 2 g by mouth bedtime.        senna-docusate (PERICOLACE) 8.6-50 mg tablet Take 1 tablet by mouth 2 (two) times a day.  0     No current facility-administered medications on file prior to visit.      Allergies   Allergen Reactions     Penicillins Rash     Whole body itchy rash.       Objective:   Visit Vitals     /78     Pulse 78     Temp 97.9  F (36.6  C) (Oral)     Resp 14     Wt 168 lb (76.2 kg)     LMP 08/12/1993     BMI 27.12 kg/m2     General: Patient appears to be in no3 acute distress.  Ears: No nodules, lesions, masses, discharge or tenderness in auricles or mastoid area. no cerumen in ear canals. Tympanic membranes pearly gray, normal bony landmarks and cone of light bilaterally, no bulges or perforations.  Nose: Inferior turbinate mucosa pink and moist, no polyps,  discharge. Septum midline. Frontal and maxillary sinuses tender with palpitation.  Oropharynx: Uvula midline. Posterior pharynx with out erythema, no exudates. Tonsils + 1.  Lymph: bilateral submandibular Lymph nodes arepalpable and not tender.  Lungs: Unlabored, expiratory rhonchi breath sounds heard throughout lung fields.  Heart: Regular rate and rhythm.    Kat was seen today for sinus problem.    Diagnoses and all orders for this visit:    Acute sinusitis  -     doxycycline (VIBRA-TABS) 100 MG tablet; Take 1 tablet (100 mg total) by mouth 2 (two) times a day for 10 days.  -     guaiFENesin ER (MUCINEX) 600 mg 12 hr tablet; Take 1 tablet (600 mg  total) by mouth daily.    Acute bronchitis  -     codeine-guaiFENesin (GUAIFENESIN AC)  mg/5 mL liquid; Take 5 mL by mouth 3 (three) times a day as needed for cough.  -     benzonatate (TESSALON PERLES) 100 MG capsule; Take 1 capsule (100 mg total) by mouth every 8 (eight) hours as needed for cough.    Hypothyroidism  -     levothyroxine (SYNTHROID, LEVOTHROID) 50 MCG tablet; Take 1 tablet (50 mcg total) by mouth daily.        Return in about 1 week (around 1/30/2017), or if symptoms worsen or fail to improve.      Leatha Dash, APRN, CNP

## 2021-06-09 ENCOUNTER — RECORDS - HEALTHEAST (OUTPATIENT)
Dept: ADMINISTRATIVE | Facility: OTHER | Age: 76
End: 2021-06-09

## 2021-06-09 NOTE — TELEPHONE ENCOUNTER
Requested Prescriptions     Pending Prescriptions Disp Refills     levothyroxine (SYNTHROID, LEVOTHROID) 25 MCG tablet 90 tablet 3     Sig: Take 1 tablet (25 mcg total) by mouth Daily at 6:00 am.

## 2021-06-09 NOTE — TELEPHONE ENCOUNTER
Refill Approved    Rx renewed per Medication Renewal Policy. Medication was last renewed on 4/20/20.    Annalisa Finley, [facility] Triage/Med Refill 7/14/2020     Requested Prescriptions   Pending Prescriptions Disp Refills     levothyroxine (SYNTHROID, LEVOTHROID) 25 MCG tablet 90 tablet 3     Sig: Take 1 tablet (25 mcg total) by mouth Daily at 6:00 am.       Thyroid Hormones Protocol Passed - 7/13/2020  1:27 PM        Passed - Provider visit in past 12 months or next 3 months     Last office visit with prescriber/PCP: 5/28/2019 Kimmie Cain MD OR same dept: Visit date not found OR same specialty: 5/28/2019 Kimmie Cain MD  Last physical: 3/9/2020 Last MTM visit: Visit date not found   Next visit within 3 mo: Visit date not found  Next physical within 3 mo: Visit date not found  Prescriber OR PCP: Kimmie Cain MD  Last diagnosis associated with med order: 1. Hypothyroidism, unspecified type  - levothyroxine (SYNTHROID, LEVOTHROID) 25 MCG tablet; Take 1 tablet (25 mcg total) by mouth Daily at 6:00 am.  Dispense: 90 tablet; Refill: 3    If protocol passes may refill for 12 months if within 3 months of last provider visit (or a total of 15 months).             Passed - TSH on file in past 12 months for patient age 12 & older     TSH   Date Value Ref Range Status   03/09/2020 4.63 0.30 - 5.00 uIU/mL Final

## 2021-06-11 NOTE — PROGRESS NOTES
Assessment:     1. Hypercalcemia  Basic Metabolic Panel    Parathyroid Hormone Intact    Vitamin D, Total (25-Hydroxy)   2. Essential hypertension  hydroCHLOROthiazide (HYDRODIURIL) 12.5 MG tablet   3. Hypothyroidism, unspecified type  levothyroxine (SYNTHROID, LEVOTHROID) 25 MCG tablet    Basic Metabolic Panel   4. Mixed hyperlipidemia  pravastatin (PRAVACHOL) 20 MG tablet   5. Need for influenza vaccination  Influenza,Quad,High Dose,PF 65 YR+     Starting you on Pravasatin - that is Cholesterol medication. Recheck fasting labs in 3-6 months  Checking calcium, Vit D and Parathyroid    BP is fairly normal  If calcium is normal we will continue hydrochlorothiazide  If it is high then we will switch to Lisinopril    Here is your note from your specialist-  I did copy and paste the specialist recommendation which were sent to her in my chart.  This is due to the fact that she is unable to access [facility] messages.    Plan:      The diagnosis was discussed with the patient and evaluation and treatment plans outlined.  See orders for lab and imaging studies.  Further follow-up plans will be based on outcome of lab/imaging studies; see orders.     Subjective:      Kat Diego is a  female who presents for evaluation of   Chief Complaint   Patient presents with     Follow-up     Pt here today to follow up cholesterol and calcium     1- HTN: She indicates that she is feeling well and   denies any symptoms referable to her elevated blood pressure.   Specifically denies chest pain, palpitations, dyspnea, orthopnea,   PND or peripheral edema    2- Hypercalcemia: She saw the endocrinologist at [facility] with a virtual visit at end of March.  The recommendation was to cut down on supplemental calcium intake.  Recheck calcium and vitamin D.  If calcium persist then change hydrochlorothiazide medication.  Reduce vitamin D intake to 2000 units daily.  Patient is already taking only 1000 unit and is here to  recheck the labs.    3- Hyperlipidemia: We had been discussing her labs and calculated ASCVD score.  Patient is willing to try a statin.  However she does not want to try Lipitor as her sister had bad reaction.  She is willing to try other.      The following portions of the patient's history were reviewed and updated as appropriate: allergies, current medications, past family history, past medical history, past social history, past surgical history and problem list.  Allergies   Allergen Reactions     Hay Fever And Allergy Relief      Penicillins Rash     Whole body itchy rash.       Current Outpatient Medications on File Prior to Visit   Medication Sig Dispense Refill     b complex vitamins capsule Take 1 capsule by mouth daily.       calcium-vitamin D (CALCIUM-VITAMIN D) 500 mg(1,250mg) -200 unit per tablet Take 1 tablet by mouth daily.        cholecalciferol, vitamin D3, 1,000 unit tablet Take 1,000 Units by mouth daily.       CINNAMON BARK (CINNAMON ORAL) Take 1 tablet by mouth daily.        coenzyme Q10 100 mg capsule Take 100 mg by mouth daily.       GLUC/CHND/OM3/DHA/EPA/FISH/STR (GLUCOSAMINE CHONDROITIN PLUS ORAL) Take 1 tablet by mouth daily.        magnesium oxide (MAG-OX) 400 mg tablet Take 325 mg by mouth daily. Powder form- dissolves in water- daily       multivitamin with minerals (THERA-M) 4.5 mg iron-200 mcg Tab Take 1 split tab by mouth 2 (two) times a day.       niacin (SLO-NIACIN) 500 mg ER tablet Take 500 mg by mouth every morning.       OMEGA-3/DHA/EPA/FISH OIL (FISH OIL-OMEGA-3 FATTY ACIDS) 300-1,000 mg capsule Take 2 g by mouth bedtime.        [DISCONTINUED] hydroCHLOROthiazide (HYDRODIURIL) 12.5 MG tablet Take 1 tablet (12.5 mg total) by mouth daily. 90 tablet 3     [DISCONTINUED] levothyroxine (SYNTHROID, LEVOTHROID) 25 MCG tablet Take 1 tablet (25 mcg total) by mouth Daily at 6:00 am. 90 tablet 2     hydrocortisone 2.5 % cream APPLY TO AFFECTED AREA TWICE A DAY  11     No current  facility-administered medications on file prior to visit.        Patient Active Problem List   Diagnosis     Hypothyroidism     Colon polyps     Knee osteoarthritis     Acute blood loss anemia     History of left knee replacement     Essential hypertension       Past Medical History:   Diagnosis Date     Allergic rhinitis      Atopic dermatitis      Disease of thyroid gland     hypo     Diverticul disease small and large intestine, no perforati or abscess      Rosacea        Past Surgical History:   Procedure Laterality Date     DENTAL SURGERY       DILATION AND CURETTAGE OF UTERUS       PA TOTAL KNEE ARTHROPLASTY Left 2015    Procedure: KNEE TOTAL ARTHROPLASTY, LEFT;  Surgeon: Rolf Helton MD;  Location: [facility] OR;  Service: Orthopedics     PA TOTAL KNEE ARTHROPLASTY Right 2016    Procedure: RIGHT KNEE TOTAL ARTHROPLASTY;  Surgeon: Rolf Helton MD;  Location: [facility] OR;  Service: Orthopedics     TOTAL KNEE ARTHROPLASTY Left 2015    [facility]; Rolf Helton MD     WISDOM TOOTH EXTRACTION         Family History   Problem Relation Age of Onset     Heart disease Mother      Stroke Mother      Colon cancer Father      Hyperlipidemia Sister      Hypertension Sister      Heart disease Brother 60        Massive MI,  in sleep       Social History     Socioeconomic History     Marital status:      Spouse name: None     Number of children: None     Years of education: None     Highest education level: None   Occupational History     None   Social Needs     Financial resource strain: None     Food insecurity     Worry: None     Inability: None     Transportation needs     Medical: None     Non-medical: None   Tobacco Use     Smoking status: Never Smoker     Smokeless tobacco: Never Used   Substance and Sexual Activity     Alcohol use: Yes     Comment: rarely     Drug use: No     Sexual activity: None   Lifestyle     Physical activity     Days per week: None      Minutes per session: None     Stress: None   Relationships     Social connections     Talks on phone: None     Gets together: None     Attends Church service: None     Active member of club or organization: None     Attends meetings of clubs or organizations: None     Relationship status: None     Intimate partner violence     Fear of current or ex partner: None     Emotionally abused: None     Physically abused: None     Forced sexual activity: None   Other Topics Concern     None   Social History Narrative    Patient is a .  She follows with a [employer] group and like minded ladies for healthy lifestyles and dietary modifications.        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        The 10-year ASCVD risk score (Sheelakinsey GONZALEZ Jr., et al., 2013) is: 30.7%      Values used to calculate the score:        Age: 74 years        Sex: Female        Is Non- : No        Diabetic: No        Tobacco smoker: No        Systolic Blood Pressure: 149 mmHg        Is BP treated: Yes        HDL Cholesterol: 71 mg/dL        Total Cholesterol: 220 mg/dL       Review of Systems  Constitutional: negative  Respiratory: negative  Cardiovascular: negative       Objective:   /77   Pulse 100   Resp 16   Wt 184 lb 3.2 oz (83.6 kg)   LMP 08/12/1993   SpO2 98%   BMI 29.85 kg/m      GENERAL: Healthy, alert and no distress  EYES: Eyes grossly normal to inspection. No discharge or erythema, or obvious scleral/conjunctival abnormalities.  RESP: No audible wheeze, cough, or visible cyanosis.  No visible retractions or increased work of breathing.    NEURO: Cranial nerves grossly intact. Mentation and speech appropriate for age.  PSYCH: Mentation appears normal, affect normal/bright, judgement and insight intact, normal speech and appearance well-groomed

## 2021-06-11 NOTE — PATIENT INSTRUCTIONS - HE
Starting you on Pravasatin - that is Cholesterol medication. Recheck fasting labs in 3-6 months  Checking calcium, Vit D and Parathyroid    BP is fairly normal  If calcium is normal we will continue hydrochlorothiazide  If it is high then we will switch to Lisinopril    Here is your note from your specialist-      Dear Mrs. Diego.  Thank you for a wonderful telephone visit today. I have completed your note which will be available to your primary care physician when she sees you again.    In summary here are my recommendations:  -Please discontinue the extra calcium supplement that you have been taking  -Continue to ensure sufficient calcium intake in your diet as you have been already  -Continue vitamin D supplement Tatian at the current dosage.  -Repeat labs including calcium and vitamin D in September when you see your primary care physician.  -Repeat bone density scan every 2 years    If your calcium lab in September 2020 is above 11, I would advise that you discuss with your primary care the following options:  -Reducing vitamin D intake to 1000 units daily instead of 2000 units daily, while continuing to take your multivitamin  -Switching from hydrochlorothiazide to another blood pressure medicine that will not affect your blood calcium levels.    Sincerely,    Benigno Camejo MD  Endocrinology, Diabetes and Metabolism  [facility]

## 2021-06-15 NOTE — PROGRESS NOTES
Assessment/Plan:        Diagnoses and all orders for this visit:    Hypothyroidism  -     levothyroxine (SYNTHROID, LEVOTHROID) 25 MCG tablet; Take 1 tablet (25 mcg total) by mouth Daily at 6:00 am.  Dispense: 90 tablet; Refill: 3    Hypertension  -     hydroCHLOROthiazide (HYDRODIURIL) 12.5 MG tablet; Take 1 tablet (12.5 mg total) by mouth daily.  Dispense: 90 tablet; Refill: 0    Patient to wait to start taking HCTZ until after trip to [place]. In meantime; I want her to do random checks of BP at home and write them down. She can recheck with me in clinic when she returns from [place] to evaluate what BPs are at home and whether she should be started on HCTZ.    ASCVD 10 year risk is 20% will, speak to her when she returns about risk/benefits of statin therapy        Subjective:    Patient ID: Kat Diego is a 72 y.o. female.    HPI patient is here for medication check for her hypothyroidism.  Her recent blood levels TSH were high with a normal T4.  She denies any symptoms.  She would like to go down on her levothyroxine she has been on 25 micrograms of levothyroxine in the past.  She is currently at 50 mcg.  We discussed going down a little less.  But she is really rather go down to 25 and then retest in a month.    Hypertension: She patient wanted to discuss starting a bite high blood pressure.  She has heard that the new blood pressure guidelines were 130/80.  She has a family history of coronary artery disease in her father who  at 60 from a massive heart attack.  She states that she does have a healthy lifestyle of daily exercise and eating well.  She is to leave on vacation to [place] this next Saturday and will be gone for 2 weeks.  She has had most of the labs done already to check for high blood pressure.  They are essentially normal.  I will calculate her ACSR from current lipid panel.  She refuses an EKG today.    The following portions of the patient's history were reviewed and updated as  appropriate: allergies, current medications, past family history, past medical history, past social history, past surgical history and problem list.    Review of Systems   All other systems reviewed and are negative.            Objective:    Physical Exam   Constitutional: She is oriented to person, place, and time. She appears well-developed and well-nourished.   Neurological: She is alert and oriented to person, place, and time.   Skin: Skin is warm and dry.   Psychiatric: She has a normal mood and affect. Her behavior is normal. Judgment and thought content normal.   Nursing note and vitals reviewed.

## 2021-06-16 NOTE — PROGRESS NOTES
Assessment/Plan:        Diagnoses and all orders for this visit:    Hypothyroidism, unspecified type  -     Thyroid Cascade  -     T4, Free    Hyperlipidemia     risks/benefits were explained of higher risk of heart attack and treatment guidelines.  Patient is not interested in Statin initiation due to side effects. Spoke to patient about the clinical trial of Bemboic Acid as a possible option but not the same side effect profile as statins. Instead patient is more interested in diatary change and recheck cholesterol in November with PCP. Educated her on a low carb/high fat diet. Diet doctor.I-Pulse given as a resource.     Hypertension  Patient would like to try taking HCTZ 12. 5 mg  at night time to see if she has better BP in the morning and throughout the day. She will follow up with me in 2 weeks and we will reevaluate; can consider 25 mg of HCTZ or switching to Lisinopril for better control. I have asked her to continue to check BP at home and bring with when she comes in again. Patient likes to make changes slowly.         Subjective:    Patient ID: Kat Diego is a 72 y.o. female.    HPI Is here for follow up Thyroid check and BP pressure check. Her BP had been consistently above 150/80 at home. I started her on HCTC 12. 5 mg in February. She stated that now her BPs are low at nighttime but high in the morning. She feels well and fine. Has family history of CAD.      We also changed her dose of levothyroxine to 25 mcg per her request because her TSH was elevated in February.     Lipids were checked last time and her ASCVD risk was 20%. Patient is not interested in taking a statin. Her sister has really negative side effects from them.     The following portions of the patient's history were reviewed and updated as appropriate: allergies, current medications, past family history, past medical history, past social history, past surgical history and problem list.    Review of Systems   All other systems  reviewed and are negative.            Objective:    Physical Exam   Constitutional: She is oriented to person, place, and time. She appears well-developed and well-nourished.   Neurological: She is alert and oriented to person, place, and time.   Psychiatric: She has a normal mood and affect. Her behavior is normal. Judgment and thought content normal.   Nursing note and vitals reviewed.

## 2021-06-16 NOTE — PATIENT INSTRUCTIONS - HE
Patient Education   Your Health Risk Assessment indicates you feel you are not in good emotional health.    Recreation   Recreation is not limited to sports and team events. It includes any activity that provides relaxation, interest, enjoyment, and exercise. Recreation provides an outlet for physical, mental, and social energy. It can give a sense of worth and achievement. It can help you stay healthy.    Mental Exercise and Social Involvement  Mental and emotional health is as important as physical health. Keep in touch with friends and family. Stay as active as possible. Continue to learn and challenge yourself.   Things you can do to stay mentally active are:    Learn something new, like a foreign language or musical instrument.     Play SCRABBLE or do crossword puzzles. If you cannot find people to play these games with you at home, you can play them with others on your computer through the Internet.     Join a games club--anything from card games to chess or checkers or lawn bowling.     Start a new hobby.     Go back to school.     Volunteer.     Read.     Keep up with world events.         Advance Directive  Patient s advance directive was discussed and I am comfortable with the patient s wishes.  Patient Education   Personalized Prevention Plan  You are due for the preventive services outlined below.  Your care team is available to assist you in scheduling these services.  If you have already completed any of these items, please share that information with your care team to update in your medical record.  Health Maintenance Due   Topic Date Due     HEPATITIS C SCREENING  Never done

## 2021-06-16 NOTE — PROGRESS NOTES
Assessment and Plan:     1. Encounter for annual wellness exam in Medicare patient  Comprehensive Metabolic Panel    HM2(CBC w/o Differential)    Hepatitis C Antibody (Anti-HCV)   2. Essential hypertension     3. Hypothyroidism, unspecified type  Thyroid Cascade   4. Mixed hyperlipidemia  Lipid [place] FASTING   5. Hypercalcemia  Parathyroid Hormone Intact   6. Weight gain       The patient's current medical problems were reviewed.    Medical decision making: Patient with hypertension, hypothyroidism and hyperlipidemia presents today for follow-up.  Lab and testing as above.  Except for the noted weight gain, otherwise she appears to be in good health.  She will work on exercise and weight loss and dietary modification.  Lab work for hypercalcemia as above.  Lab work for hypothyroidism as above.    I have had an Advance Directives discussion with the patient.  The following health maintenance schedule was reviewed with the patient and provided in printed form in the after visit summary:   Health Maintenance Due   Topic Date Due     HEPATITIS C SCREENING  Never done        Subjective:     Chief Complaint   Patient presents with     Annual Wellness Visit     Fasting     Chief Complaint: Kat Diego is an 75 y.o. female here for an Annual Wellness visit.   HPI: Patient with hypertension, hyperlipidemia and hypercalcemia is here today for follow-up.  She has been worked up for hyperparathyroidism and being followed with endocrinology after she was noted to have calcium at upper limit.  She was started on pravastatin at last visit for lipids and she has tolerated that well.  She denies any shortness of breath or chest pain.  She does have shoulder stiffness and does exercises.  She informs me that she has a small tear in her left shoulder, not enough to be repaired but enough to cause stiffness.  She has gained weight during this pandemic due to lack of activity.  She has stayed home for the last 13 months.  She  just got finished with the COVID-19 vaccine and plans to start gym soon.    She has 1 biological child who lives in [place].  His wife has a child with special needs.  They do not have biological children.  She has 7 stepchildren and 25 grandchildren most of them who reside in [place].  She informs me that sending cards and keeping the birthday in order keeps her busy    Review of Systems:    Please see above.  The rest of the review of systems are negative for all systems.    Patient Care Team:  Kimmie Cain MD as PCP - General (Family Medicine)  Kimmie Cain MD as Assigned PCP     Patient Active Problem List   Diagnosis     Hypothyroidism     Colon polyps     Knee osteoarthritis     Acute blood loss anemia     History of left knee replacement     Essential hypertension     Hypercalcemia     Past Medical History:   Diagnosis Date     Allergic rhinitis      Atopic dermatitis      Disease of thyroid gland     hypo     Diverticul disease small and large intestine, no perforati or abscess      Rosacea       Past Surgical History:   Procedure Laterality Date     DENTAL SURGERY       DILATION AND CURETTAGE OF UTERUS       HI TOTAL KNEE ARTHROPLASTY Left 2015    Procedure: KNEE TOTAL ARTHROPLASTY, LEFT;  Surgeon: Rolf Helton MD;  Location: [facility] OR;  Service: Orthopedics     HI TOTAL KNEE ARTHROPLASTY Right 2016    Procedure: RIGHT KNEE TOTAL ARTHROPLASTY;  Surgeon: Rolf Helton MD;  Location: [facility] OR;  Service: Orthopedics     TOTAL KNEE ARTHROPLASTY Left 2015    [facility]; Rolf Helton MD     WISDOM TOOTH EXTRACTION        Family History   Problem Relation Age of Onset     Heart disease Mother      Stroke Mother      Colon cancer Father      Hyperlipidemia Sister      Hypertension Sister      Heart disease Brother 60        Massive MI,  in sleep      Social History     Socioeconomic History     Marital status:      Spouse name: Not  on file     Number of children: Not on file     Years of education: Not on file     Highest education level: Not on file   Occupational History     Not on file   Social Needs     Financial resource strain: Not on file     Food insecurity     Worry: Not on file     Inability: Not on file     Transportation needs     Medical: Not on file     Non-medical: Not on file   Tobacco Use     Smoking status: Never Smoker     Smokeless tobacco: Never Used   Substance and Sexual Activity     Alcohol use: Yes     Comment: rarely     Drug use: No     Sexual activity: Not on file   Lifestyle     Physical activity     Days per week: Not on file     Minutes per session: Not on file     Stress: Not on file   Relationships     Social connections     Talks on phone: Not on file     Gets together: Not on file     Attends Confucianism service: Not on file     Active member of club or organization: Not on file     Attends meetings of clubs or organizations: Not on file     Relationship status: Not on file     Intimate partner violence     Fear of current or ex partner: Not on file     Emotionally abused: Not on file     Physically abused: Not on file     Forced sexual activity: Not on file   Other Topics Concern     Not on file   Social History Narrative    Patient is a .  She follows with a [employer] group and like minded ladies for healthy lifestyles and dietary modifications.        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        The 10-year ASCVD risk score ([place] CARLOS Jr., et al., 2013) is: 30.7%      Values used to calculate the score:        Age: 74 years        Sex: Female        Is Non- : No        Diabetic: No        Tobacco smoker: No        Systolic Blood Pressure: 149 mmHg        Is BP treated: Yes        HDL Cholesterol: 71 mg/dL        Total Cholesterol: 220 mg/dL      Current Outpatient Medications   Medication Sig  "Dispense Refill     b complex vitamins capsule Take 1 capsule by mouth daily.       cholecalciferol, vitamin D3, 1,000 unit tablet Take 1,000 Units by mouth daily.       CINNAMON BARK (CINNAMON ORAL) Take 1 tablet by mouth daily.        coenzyme Q10 100 mg capsule Take 100 mg by mouth daily.       docosahexaenoic acid-epa 120-180 mg cap Take 2 g by mouth.       GLUC/CHND/OM3/DHA/EPA/FISH/STR (GLUCOSAMINE CHONDROITIN PLUS ORAL) Take 1 tablet by mouth daily.        hydroCHLOROthiazide (HYDRODIURIL) 12.5 MG tablet Take 1 tablet (12.5 mg total) by mouth daily. 90 tablet 3     hydrocortisone 2.5 % cream APPLY TO AFFECTED AREA TWICE A DAY  11     levothyroxine (SYNTHROID, LEVOTHROID) 25 MCG tablet Take 1 tablet (25 mcg total) by mouth Daily at 6:00 am. 90 tablet 2     magnesium oxide (MAG-OX) 400 mg tablet Take 325 mg by mouth daily. Powder form- dissolves in water- daily       multivitamin with minerals (THERA-M) 4.5 mg iron-200 mcg Tab Take 1 split tab by mouth 2 (two) times a day.       niacin (SLO-NIACIN) 500 mg ER tablet Take 500 mg by mouth every morning.       OMEGA-3/DHA/EPA/FISH OIL (FISH OIL-OMEGA-3 FATTY ACIDS) 300-1,000 mg capsule Take 2 g by mouth bedtime.        pravastatin (PRAVACHOL) 20 MG tablet Take 1 tablet (20 mg total) by mouth every evening. 30 tablet 11     VITAMIN B COMPLEX ORAL Take 1 capsule by mouth.       No current facility-administered medications for this visit.       Objective:   Vital Signs:   Visit Vitals  /79   Pulse (!) 58   Temp 97.7  F (36.5  C) (Oral)   Ht 5' 5.75\" (1.67 m)   Wt 191 lb (86.6 kg)   LMP 08/12/1993   BMI 31.06 kg/m           VisionScreening:  No exam data present     PHYSICAL EXAM  GENERAL: Healthy, alert and no distress  EYES: Eyes grossly normal to inspection. No discharge or erythema, or obvious scleral/conjunctival abnormalities.  NECK: no adenopathy, no asymmetry, masses, or scars, thyroid normal to palpation, trachea midline and normal to palpation and no " carotid bruits  RESP: lungs clear to auscultation - no rales, rhonchi or wheezes  CV: regular rates and rhythm  MS: no edema.  Wears PATRICIA stockings.  NEURO: Cranial nerves grossly intact. Mentation and speech appropriate for age.  PSYCH: Mentation appears normal, affect normal/bright, judgement and insight intact, normal speech and appearance well-groomed      Assessment Results 3/29/2021   Activities of Daily Living No help needed   Instrumental Activities of Daily Living No help needed   Get Up and Go Score -   Mini Cog Total Score 5   Some recent data might be hidden     A Mini-Cog score of 0-2 suggests the possibility of dementia, score of 3-5 suggests no dementia    Identified Health Risks:     The patient was provided with suggestions to help her develop a healthy emotional lifestyle.   Patient's advanced directive was discussed and I am comfortable with the patient's wishes.

## 2021-06-17 NOTE — PATIENT INSTRUCTIONS - HE
Patient Instructions by Maxine Torres PT at 7/18/2019  8:30 AM     Author: Maxine Torres PT Service: -- Author Type: Physical Therapist    Filed: 7/18/2019  9:00 AM Encounter Date: 7/18/2019 Status: Signed    : Maxine Torres PT (Physical Therapist)         Stretch forward and backwards  Hold 5 seconds each position  x10-15 repetitions  1-2 sets

## 2021-06-17 NOTE — PATIENT INSTRUCTIONS - HE
Patient Instructions by Jocelin Mooney PT at 8/28/2019 10:00 AM     Author: Jocelin Mooney PT Service: -- Author Type: Physical Therapist    Filed: 8/28/2019 11:32 AM Encounter Date: 8/28/2019 Status: Addendum    : Joceiln Mooney PT (Physical Therapist)    Related Notes: Original Note by Jocelin Mooney PT (Physical Therapist) filed at 8/28/2019 10:28 AM        STANDING MARCHING - SINGLE LEG    While standing, lift your foot and knee up, set it down and then repeat on the same side.    Use your arms for support if needed for balance and safety. Stand on the side with one arm support      GO SLOW, 10x each leg, 2-3 sets             HIP ABDUCTION - STANDING     While standing, raise your leg out to the side. Keep your knee straight and maintain your toes pointed forward the entire time.      Use your arms for support if needed for balance and safety.     10x each leg, 2-3 sets      Reach arm overhead exercise

## 2021-06-17 NOTE — PATIENT INSTRUCTIONS - HE
Patient Instructions by Jocelin Mooney PT at 7/29/2019 10:00 AM     Author: Jocelin Mooney PT Service: -- Author Type: Physical Therapist    Filed: 7/29/2019 10:28 AM Encounter Date: 7/29/2019 Status: Signed    : Jocelin Mooney PT (Physical Therapist)        side lying thoracic stretch    With pillow under your side raise your arm up over your head as far as you can to feel a good stretch along your side. Make sure that you breath and do not stretch into discomfort     ** Make sure hips are in line on your side.

## 2021-06-17 NOTE — PATIENT INSTRUCTIONS - HE
"Patient Instructions by Maxine Torres PT at 6/19/2019 11:00 AM     Author: Maxine Torres PT Service: -- Author Type: Physical Therapist    Filed: 6/19/2019 11:27 AM Encounter Date: 6/19/2019 Status: Signed    : Maxine Torres PT (Physical Therapist)        BRIDGING    While lying on your back, tighten your lower abdominals, squeeze your buttocks and then raise your buttocks off the floor/bed as creating a \"Bridge\" with your body.    Roll up and roll down  x10-15 repetitions  1-2 sets      LOWER TRUNK ROTATIONS - LTR    Lying on your back with your knees bent, gently move your knees side-to-side.    Hold 5-10 seconds  x10-15 repetitions each side            "

## 2021-06-17 NOTE — PATIENT INSTRUCTIONS - HE
Patient Instructions by Jocelin Mooney PT at 9/18/2019  2:00 PM     Author: Jocelin Mooney PT Service: -- Author Type: Physical Therapist    Filed: 9/18/2019  2:30 PM Encounter Date: 9/18/2019 Status: Signed    : Jocelin Mooney PT (Physical Therapist)        ELASTIC BAND ROWS     Holding elastic band with both hands,  Thumbs down. Drop shoulders pull back and in. Keep your elbows near the side of your body.      2x10-15    Orange is easier, green is more resistance

## 2021-06-17 NOTE — PATIENT INSTRUCTIONS - HE
Patient Instructions by Kimmie Cain MD at 5/28/2019 12:50 PM     Author: Kimmie Cain MD Service: -- Author Type: Physician    Filed: 5/28/2019  1:53 PM Encounter Date: 5/28/2019 Status: Addendum    : Kimmie Cain MD (Physician)    Related Notes: Original Note by Kimmie aCin MD (Physician) filed at 5/28/2019  1:52 PM         Relieving Tension in Your Back  Being relaxed helps keep your mind healthy and your back ready to move. Take short breaks often. Walk around. Stretch. Switch tasks. Also give the following a try.  Make time to relax. Start by setting aside 5 minutes daily.   Deep breathing    Deep breathing is a simple way to reduce stress. You can do it almost any time you need to relax.    Inhale slowly through your nose. Let your lungs and stomach expand.    Hold your breath for 2 to 3 seconds.    Exhale slowly through your mouth until your lungs feel empty. Repeat 3 to 4 times.  Relieve tension  Muscle tension can create tender spots called trigger points. The tips below may help relieve muscle tension.    Press the trigger point if you can reach it. If not, lie on a soft tennis ball, or ask a friend to press the spot. Use steady pressure for 10 to 15 seconds. Breathe deeply. Repeat a few times.    Massage trigger points with ice for 2 to 5 minutes. Press lightly at first. Slowly increase firmness.  Date Last Reviewed: 10/18/2015    6032-3745 [employer]. [address] [place] [address], [place] [id number]. All rights reserved. This information is not intended as a substitute for professional medical care. Always follow your healthcare professional's instructions.

## 2021-06-17 NOTE — PATIENT INSTRUCTIONS - HE
Patient Instructions by Maxine Torres PT at 7/10/2019  9:30 AM     Author: Maxine Torres PT Service: -- Author Type: Physical Therapist    Filed: 7/10/2019  9:56 AM Encounter Date: 7/10/2019 Status: Signed    : Maxine Torres PT (Physical Therapist)          THORACIC LIFT    Press both shoulders down toward the floor; keeping your shoulder pressed back, lift your mid-back (between shoulder blades) off the floor.    Hold 2-3 seconds  x10-15 repetitions  1-2 sets        SCAPULAR RETRACTIONS    Draw your shoulder blades back and down.    Hold 5 seconds  x50 throughout the day

## 2021-06-22 NOTE — TELEPHONE ENCOUNTER
New Appointment Needed  What is the reason for the visit:    Shingrix Vaccine #1  Provider Preference: RN  How soon do you need to be seen?: when it becomes available  Waitlist offered?: No  Okay to leave a detailed message:  Yes

## 2021-06-22 NOTE — TELEPHONE ENCOUNTER
I added patient to the wait list for the Shingrix vaccine and we will call once we have some in stock.

## 2021-06-23 NOTE — PATIENT INSTRUCTIONS - HE
Call and check for insurance coverage for new shingles vaccine        Advance Directive  Patient s advance directive was discussed and I am comfortable with the patient s wishes.  Patient Education   Personalized Prevention Plan  You are due for the preventive services outlined below.  Your care team is available to assist you in scheduling these services.  If you have already completed any of these items, please share that information with your care team to update in your medical record.  Health Maintenance   Topic Date Due     ZOSTER VACCINES (2 of 3) 03/18/2007     DXA SCAN  10/27/2008     FALL RISK ASSESSMENT  02/05/2019     ADVANCE DIRECTIVES DISCUSSED WITH PATIENT  01/23/2022     COLONOSCOPY  10/15/2023     TD 18+ HE  10/15/2025     PNEUMOCOCCAL POLYSACCHARIDE VACCINE AGE 65 AND OVER  Completed     INFLUENZA VACCINE RULE BASED  Completed     PNEUMOCOCCAL CONJUGATE VACCINE FOR ADULTS (PCV13 OR PREVNAR)  Completed

## 2021-06-23 NOTE — PROGRESS NOTES
Assessment and Plan:       1. Routine general medical examination at a health care facility  Immunizations reviewed and updated .  Alcohol use, safety and moderation discussed.  Recommended adequate calcium intake/osteoporosis prevention.  Discussed colon cancer screening at age 50, 45 if -American.  Diet and exercise reviewed, including goal of aerobic exercise 30-90 minutes most days of the week, moderation of portions sizes, avoiding eating out and fast food and increase in fruits and vegetables.  Discussed sun protection.  Discussed weight management.    - Comprehensive Metabolic Panel  - HM2(CBC w/o Differential)    2. Hypothyroidism, unspecified type  Pt with h/o hypothyroidism.  Due for labs and refill  - Thyroid Cascade  - levothyroxine (SYNTHROID, LEVOTHROID) 25 MCG tablet; Take 1 tablet (25 mcg total) by mouth Daily at 6:00 am.  Dispense: 90 tablet; Refill: 3    3. Essential hypertension  BP under control due for labs.  Plan to continue HCTZ.  - Comprehensive Metabolic Panel  - HM2(CBC w/o Differential)  - hydroCHLOROthiazide (HYDRODIURIL) 12.5 MG tablet; Take 1 tablet (12.5 mg total) by mouth daily.  Dispense: 90 tablet; Refill: 3     The patient's current medical problems were reviewed.    I have had an Advance Directives discussion with the patient.  The following high BMI interventions were performed this visit: encouragement to exercise and lifestyle education regarding diet  The following health maintenance schedule was reviewed with the patient and provided in printed form in the after visit summary:   Health Maintenance   Topic Date Due     ZOSTER VACCINES (2 of 3) 03/18/2007     DXA SCAN  10/27/2008     FALL RISK ASSESSMENT  02/05/2019     ADVANCE DIRECTIVES DISCUSSED WITH PATIENT  01/23/2022     COLONOSCOPY  10/15/2023     TD 18+ HE  10/15/2025     PNEUMOCOCCAL POLYSACCHARIDE VACCINE AGE 65 AND OVER  Completed     INFLUENZA VACCINE RULE BASED  Completed     PNEUMOCOCCAL CONJUGATE VACCINE  FOR ADULTS (PCV13 OR PREVNAR)  Completed        Subjective:   Chief Complaint: Kat Diego is an 73 y.o. female here for an Annual Wellness visit.   HPI: Patient presents today for her annual wellness exam.  She does have a history of hypothyroidism and is due for labs today.  Patient states she exercises regularly at [employer].  She wears daily and does between 5 and 7000 steps a day.  She is also and a [employer] weight loss program and her goal weight is 155.  So she is hoping to lose 20.  Patient states that since she has had both knees replaced she is able to exercise more and that has helped her with her weight.  She is happy with the knee replacement.  Finally patient noticed that she once had a very fast heart rate.  Last about a day and went away.  It happened one time only and has not reoccurred.  She denies any triggers for it.  No pain when she had it.  She denies any caffeine use.    Review of Systems:    Please see above.  The rest of the review of systems are negative for all systems.    Patient Care Team:  Farida Babin MD as PCP - General (Family Medicine)     Patient Active Problem List   Diagnosis     Hypothyroidism     Colon polyps     Knee osteoarthritis     Disease of thyroid gland     Acute blood loss anemia     History of left knee replacement     Past Medical History:   Diagnosis Date     Allergic rhinitis      Atopic dermatitis      Disease of thyroid gland     hypo     Diverticul disease small and large intestine, no perforati or abscess      Rosacea       Past Surgical History:   Procedure Laterality Date     COLONOSCOPY       DENTAL SURGERY       DILATION AND CURETTAGE OF UTERUS       SD TOTAL KNEE ARTHROPLASTY Left 11/25/2015    Procedure: KNEE TOTAL ARTHROPLASTY, LEFT;  Surgeon: Rolf Helton MD;  Location: [facility];  Service: Orthopedics     SD TOTAL KNEE ARTHROPLASTY Right 9/23/2016    Procedure: RIGHT KNEE TOTAL ARTHROPLASTY;  Surgeon: Rolf Helton MD;   Location: [facility];  Service: Orthopedics     TOTAL KNEE ARTHROPLASTY Left 2015    [facility]; Rolf Helton MD     WISDOM TOOTH EXTRACTION        Family History   Problem Relation Age of Onset     Heart disease Mother      Stroke Mother      Colon cancer Father      Hyperlipidemia Sister      Hypertension Sister      Heart disease Brother 60        Massive MI,  in sleep      Social History     Socioeconomic History     Marital status:      Spouse name: Not on file     Number of children: Not on file     Years of education: Not on file     Highest education level: Not on file   Social Needs     Financial resource strain: Not on file     Food insecurity - worry: Not on file     Food insecurity - inability: Not on file     Transportation needs - medical: Not on file     Transportation needs - non-medical: Not on file   Occupational History     Not on file   Tobacco Use     Smoking status: Never Smoker     Smokeless tobacco: Never Used   Substance and Sexual Activity     Alcohol use: Yes     Comment: rarely     Drug use: No     Sexual activity: Not on file   Other Topics Concern     Not on file   Social History Narrative     Not on file      Current Outpatient Medications   Medication Sig Dispense Refill     b complex vitamins capsule Take 1 capsule by mouth daily.       calcium-vitamin D (CALCIUM-VITAMIN D) 500 mg(1,250mg) -200 unit per tablet Take 1 tablet by mouth daily.        cholecalciferol, vitamin D3, 1,000 unit tablet Take 1,000 Units by mouth daily.       CINNAMON BARK (CINNAMON ORAL) Take 1 tablet by mouth daily.        clindamycin (CLEOCIN) 300 MG capsule Take 2 capsules by mouth. 1 hour before procedure.       coenzyme Q10 100 mg capsule Take 100 mg by mouth daily.       GLUC/CHND/OM3/DHA/EPA/FISH/STR (GLUCOSAMINE CHONDROITIN PLUS ORAL) Take 1 tablet by mouth daily.        hydroCHLOROthiazide (HYDRODIURIL) 12.5 MG tablet Take 1 tablet (12.5 mg total) by mouth daily. 90  "tablet 2     levothyroxine (SYNTHROID, LEVOTHROID) 25 MCG tablet Take 1 tablet (25 mcg total) by mouth Daily at 6:00 am. 90 tablet 3     magnesium oxide (MAG-OX) 400 mg tablet Take 400 mg by mouth daily.       multivitamin with minerals (THERA-M) 4.5 mg iron-200 mcg Tab Take 1 split tab by mouth 2 (two) times a day.       niacin (SLO-NIACIN) 500 mg ER tablet Take 500 mg by mouth every morning.       OMEGA-3/DHA/EPA/FISH OIL (FISH OIL-OMEGA-3 FATTY ACIDS) 300-1,000 mg capsule Take 2 g by mouth bedtime.        No current facility-administered medications for this visit.       Objective:   Vital Signs:   Visit Vitals  /73 (Patient Site: Left Arm, Patient Position: Sitting, Cuff Size: Adult Large)   Pulse 80   Temp 96.9  F (36.1  C) (Oral)   Resp 16   Ht 5' 6\" (1.676 m)   Wt 177 lb (80.3 kg)   LMP 08/12/1993   BMI 28.57 kg/m         VisionScreening:  No exam data present     PHYSICAL EXAM  GENERAL:  Reveals an alert female in NAD.  Vitals:  Per nursing notes.  EYES: PERRLA. Extraocular movements intact. Normal conjunctiva and lids.   ENT:  Hearing grossly normal.  Normal appearance to ears and nose.  Bilateral TM s, external canals, oropharynx normal. Normal lips, gums and teeth.  Normal nasal mucosa, septum and turbinates.  NECK:  Supple, without thyromegaly or mass.  LUNGS:  Clear to auscultation without crackles, wheezes or distress.  Normal respiratory effort.   CV:  Regular rate and rhythm without murmurs, rubs or gallops. No varicosities or edema. Carotids without bruits.   ABDOMEN:  Soft, non-tender, without organomegaly, masses, or hernias.   BREASTS:  Non-tender, without masses, nipple discharge, erythema, or axillary adenopathy.    :  Not examined  LYMPH: Normal palpation of neck, groin and axilla. No lymphadenopathy. No bruising.  NEURO:  CN II-XII intact.   PSYCH:  Alert & oriented with normal mood and affect.   SKIN:  Normal inspection and palpation.  MSK: Normal gait and station. Normal inspection, " ROM, stability and strength    Assessment Results 1/16/2019   Activities of Daily Living No help needed   Instrumental Activities of Daily Living No help needed   Get Up and Go Score Less than 12 seconds   Mini Cog Total Score 5   Some recent data might be hidden     A Mini-Cog score of 0-2 suggests the possibility of dementia, score of 3-5 suggests no dementia    Identified Health Risks:     Patient's advanced directive was discussed and I am comfortable with the patient's wishes.

## 2021-07-05 ENCOUNTER — COMMUNICATION - HEALTHEAST (OUTPATIENT)
Dept: FAMILY MEDICINE | Facility: CLINIC | Age: 76
End: 2021-07-05

## 2021-07-05 DIAGNOSIS — E03.9 HYPOTHYROIDISM, UNSPECIFIED TYPE: ICD-10-CM

## 2021-07-05 NOTE — TELEPHONE ENCOUNTER
Telephone Encounter by Dinorah Kimball CMA at 7/5/2021  9:37 AM     Author: Dinorah Kimball CMA Service: -- Author Type: Certified Medical Assistant    Filed: 7/5/2021  9:37 AM Encounter Date: 7/5/2021 Status: Signed    : Dinorah Kimball CMA (Certified Medical Assistant)       Requested Prescriptions     Pending Prescriptions Disp Refills   ? levothyroxine (SYNTHROID, LEVOTHROID) 25 MCG tablet 90 tablet 2     Sig: Take 1 tablet (25 mcg total) by mouth Daily at 6:00 am.

## 2021-07-05 NOTE — TELEPHONE ENCOUNTER
Telephone Encounter by Osiris Downs RN at 7/5/2021 11:42 AM     Author: Osiris Downs RN Service: -- Author Type: Registered Nurse    Filed: 7/5/2021 11:43 AM Encounter Date: 7/5/2021 Status: Signed    : Osiris Downs RN (Registered Nurse)       Refill Approved    Rx renewed per Medication Renewal Policy. Medication was last renewed on 09/21/2020.  Last office visit was 03/29/2021 with PCP.    Osiris Downs, Care Connection Triage/Med Refill 7/5/2021     Requested Prescriptions   Pending Prescriptions Disp Refills   ? levothyroxine (SYNTHROID, LEVOTHROID) 25 MCG tablet 90 tablet 2     Sig: Take 1 tablet (25 mcg total) by mouth Daily at 6:00 am.       Thyroid Hormones Protocol Passed - 7/5/2021  9:37 AM        Passed - Provider visit in past 12 months or next 3 months     Last office visit with prescriber/PCP: 9/21/2020 Kimmie Cain MD OR same dept: 9/21/2020 Kimmie Cain MD OR same specialty: 9/21/2020 Kimmie Cain MD  Last physical: 3/29/2021 Last MTM visit: Visit date not found   Next visit within 3 mo: Visit date not found  Next physical within 3 mo: Visit date not found  Prescriber OR PCP: Kimmie Cain MD  Last diagnosis associated with med order: 1. Hypothyroidism, unspecified type  - levothyroxine (SYNTHROID, LEVOTHROID) 25 MCG tablet; Take 1 tablet (25 mcg total) by mouth Daily at 6:00 am.  Dispense: 90 tablet; Refill: 2    If protocol passes may refill for 12 months if within 3 months of last provider visit (or a total of 15 months).             Passed - TSH on file in past 12 months for patient age 12 & older     TSH   Date Value Ref Range Status   03/29/2021 5.89 (H) 0.30 - 5.00 uIU/mL Final

## 2021-07-06 ENCOUNTER — COMMUNICATION - HEALTHEAST (OUTPATIENT)
Dept: FAMILY MEDICINE | Facility: CLINIC | Age: 76
End: 2021-07-06

## 2021-07-06 DIAGNOSIS — E78.2 MIXED HYPERLIPIDEMIA: ICD-10-CM

## 2021-07-06 NOTE — TELEPHONE ENCOUNTER
Telephone Encounter by Rica Miles RN at 7/6/2021  1:54 PM     Author: Rica Miles RN Service: -- Author Type: Registered Nurse    Filed: 7/6/2021  1:56 PM Encounter Date: 7/6/2021 Status: Signed    : Rica Miles RN (Registered Nurse)       Medication refill request declined: should have refills on file already     Disp Refills Start End    pravastatin (PRAVACHOL) 20 MG tablet 30 tablet 11 9/21/2020     Sig - Route: Take 1 tablet (20 mg total) by mouth every evening. - Oral    Sent to pharmacy as: pravastatin 20 mg tablet (PravachoL)    E-Prescribing Status: Receipt confirmed by pharmacy (9/21/2020  8:58 AM CDT)      Rica Miles RN [facility] Care Connection Triage/Med Refill 7/6/2021 1:55 PM

## 2021-07-08 ENCOUNTER — COMMUNICATION - HEALTHEAST (OUTPATIENT)
Dept: FAMILY MEDICINE | Facility: CLINIC | Age: 76
End: 2021-07-08

## 2021-07-08 DIAGNOSIS — E03.9 HYPOTHYROIDISM, UNSPECIFIED TYPE: ICD-10-CM

## 2021-07-08 DIAGNOSIS — I10 ESSENTIAL HYPERTENSION: ICD-10-CM

## 2021-07-08 NOTE — TELEPHONE ENCOUNTER
Telephone Encounter by Linda Fowler LPN at 7/8/2021 11:41 AM     Author: Linda Fowler LPN Service: -- Author Type: Licensed Nurse    Filed: 7/8/2021 11:42 AM Encounter Date: 7/8/2021 Status: Signed    : Linda Fowler LPN (Licensed Nurse)       Requested Prescriptions     Pending Prescriptions Disp Refills   ? hydroCHLOROthiazide (HYDRODIURIL) 12.5 MG tablet 90 tablet 3     Sig: Take 1 tablet (12.5 mg total) by mouth daily.

## 2021-07-08 NOTE — TELEPHONE ENCOUNTER
Telephone Encounter by Edward Holland RN at 7/8/2021 12:39 PM     Author: Edward Holland RN Service: -- Author Type: Registered Nurse    Filed: 7/8/2021 12:39 PM Encounter Date: 7/8/2021 Status: Signed    : Edward Holland, RN (Registered Nurse)       RN cannot approve Refill Request    RN can NOT refill this medication   Patient request early refill. Medication last filled 9/21/20 for qty 90 refill 3. Provider to advise on request. . Last office visit: 9/21/2020 Kimmie Cain MD Last Physical: 3/29/2021 Last MTM visit: Visit date not found Last visit same specialty: 9/21/2020 Kimmie Cain MD.  Next visit within 3 mo: Visit date not found  Next physical within 3 mo: Visit date not found      Edward Holland, [facility] Connection Triage/Med Refill 7/8/2021    Requested Prescriptions   Pending Prescriptions Disp Refills   ? hydroCHLOROthiazide (HYDRODIURIL) 12.5 MG tablet 90 tablet 3     Sig: Take 1 tablet (12.5 mg total) by mouth daily.       Diuretics/Combination Diuretics Refill Protocol  Passed - 7/8/2021 11:42 AM        Passed - Visit with PCP or prescribing provider visit in past 12 months     Last office visit with prescriber/PCP: 9/21/2020 Kimmie Cain MD OR same dept: 9/21/2020 Kimmie Cain MD OR same specialty: 9/21/2020 Kimmie Cain MD  Last physical: 3/29/2021 Last MTM visit: Visit date not found   Next visit within 3 mo: Visit date not found  Next physical within 3 mo: Visit date not found  Prescriber OR PCP: Kimmie Cain MD  Last diagnosis associated with med order: 1. Hypothyroidism, unspecified type    2. Essential hypertension  - hydroCHLOROthiazide (HYDRODIURIL) 12.5 MG tablet; Take 1 tablet (12.5 mg total) by mouth daily.  Dispense: 90 tablet; Refill: 3    If protocol passes may refill for 12 months if within 3 months of last provider visit (or a total of 15 months).             Passed - Serum Potassium in past 12 months      Lab Results   Component Value Date    Potassium  4.2 03/29/2021             Passed - Serum Sodium in past 12 months      Lab Results   Component Value Date    Sodium 141 03/29/2021             Passed - Blood pressure on file in past 12 months     BP Readings from Last 1 Encounters:   03/29/21 133/79             Passed - Serum Creatinine in past 12 months      Creatinine   Date Value Ref Range Status   03/29/2021 0.72 0.60 - 1.10 mg/dL Final

## 2021-07-08 NOTE — TELEPHONE ENCOUNTER
Telephone Encounter by Amy Pizarro CMA at 7/8/2021  4:14 PM     Author: Amy Pizarro CMA Service: -- Author Type: Certified Medical Assistant    Filed: 7/8/2021  4:16 PM Encounter Date: 7/8/2021 Status: Signed    : Amy Pizarro CMA (Certified Medical Assistant)       A user error has taken place : encounter opened in error, closed for administrative reasons.

## 2021-09-03 DIAGNOSIS — E78.2 MIXED HYPERLIPIDEMIA: Primary | ICD-10-CM

## 2021-09-04 RX ORDER — PRAVASTATIN SODIUM 20 MG
20 TABLET ORAL DAILY
Qty: 90 TABLET | Refills: 1 | Status: SHIPPED | OUTPATIENT
Start: 2021-09-04 | End: 2022-03-11

## 2021-09-04 NOTE — TELEPHONE ENCOUNTER
"  Last office visit provider:  3/29/21     Requested Prescriptions   Pending Prescriptions Disp Refills     pravastatin (PRAVACHOL) 20 MG tablet 90 tablet 3     Sig: Take 1 tablet (20 mg) by mouth daily       Statins Protocol Passed - 9/3/2021  5:23 PM        Passed - LDL on file in past 12 months     Recent Labs   Lab Test 03/29/21  0953   LDL 92             Passed - No abnormal creatine kinase in past 12 months     No lab results found.             Passed - Recent (12 mo) or future (30 days) visit within the authorizing provider's specialty     Patient has had an office visit with the authorizing provider or a provider within the authorizing providers department within the previous 12 mos or has a future within next 30 days. See \"Patient Info\" tab in inbasket, or \"Choose Columns\" in Meds & Orders section of the refill encounter.              Passed - Medication is active on med list        Passed - Patient is age 18 or older        Passed - No active pregnancy on record        Passed - No positive pregnancy test in past 12 months             nadia ko RN 09/04/21 11:45 AM  "

## 2021-09-13 ENCOUNTER — TRANSFERRED RECORDS (OUTPATIENT)
Dept: HEALTH INFORMATION MANAGEMENT | Facility: CLINIC | Age: 76
End: 2021-09-13

## 2021-10-10 ENCOUNTER — HEALTH MAINTENANCE LETTER (OUTPATIENT)
Age: 76
End: 2021-10-10

## 2022-03-10 DIAGNOSIS — E78.2 MIXED HYPERLIPIDEMIA: ICD-10-CM

## 2022-03-11 RX ORDER — PRAVASTATIN SODIUM 20 MG
20 TABLET ORAL DAILY
Qty: 90 TABLET | Refills: 0 | Status: SHIPPED | OUTPATIENT
Start: 2022-03-11 | End: 2022-04-04

## 2022-03-11 NOTE — TELEPHONE ENCOUNTER
"Last Written Prescription Date:  9/4/21  Last Fill Quantity: 90,  # refills: 1   Last office visit provider:  3/29/21     Requested Prescriptions   Pending Prescriptions Disp Refills     pravastatin (PRAVACHOL) 20 MG tablet 90 tablet 1     Sig: Take 1 tablet (20 mg) by mouth daily       Statins Protocol Passed - 3/10/2022  3:27 PM        Passed - LDL on file in past 12 months     Recent Labs   Lab Test 03/29/21  0953   LDL 92             Passed - No abnormal creatine kinase in past 12 months     No lab results found.             Passed - Recent (12 mo) or future (30 days) visit within the authorizing provider's specialty     Patient has had an office visit with the authorizing provider or a provider within the authorizing providers department within the previous 12 mos or has a future within next 30 days. See \"Patient Info\" tab in inbasket, or \"Choose Columns\" in Meds & Orders section of the refill encounter.              Passed - Medication is active on med list        Passed - Patient is age 18 or older        Passed - No active pregnancy on record        Passed - No positive pregnancy test in past 12 months             Rica Miles RN 03/11/22 9:00 AM  "

## 2022-04-04 ENCOUNTER — OFFICE VISIT (OUTPATIENT)
Dept: FAMILY MEDICINE | Facility: CLINIC | Age: 77
End: 2022-04-04
Payer: COMMERCIAL

## 2022-04-04 VITALS
SYSTOLIC BLOOD PRESSURE: 135 MMHG | HEIGHT: 65 IN | DIASTOLIC BLOOD PRESSURE: 61 MMHG | OXYGEN SATURATION: 98 % | HEART RATE: 72 BPM | WEIGHT: 187 LBS | BODY MASS INDEX: 31.16 KG/M2

## 2022-04-04 DIAGNOSIS — Z12.31 ENCOUNTER FOR SCREENING MAMMOGRAM FOR BREAST CANCER: Primary | ICD-10-CM

## 2022-04-04 DIAGNOSIS — L30.9 ECZEMA, UNSPECIFIED TYPE: ICD-10-CM

## 2022-04-04 DIAGNOSIS — I10 ESSENTIAL HYPERTENSION: ICD-10-CM

## 2022-04-04 DIAGNOSIS — E03.9 HYPOTHYROIDISM, UNSPECIFIED TYPE: ICD-10-CM

## 2022-04-04 DIAGNOSIS — M70.61 TROCHANTERIC BURSITIS OF RIGHT HIP: ICD-10-CM

## 2022-04-04 DIAGNOSIS — Z00.00 ENCOUNTER FOR MEDICARE ANNUAL WELLNESS EXAM: ICD-10-CM

## 2022-04-04 DIAGNOSIS — E83.52 HYPERCALCEMIA: ICD-10-CM

## 2022-04-04 DIAGNOSIS — E78.2 MIXED HYPERLIPIDEMIA: ICD-10-CM

## 2022-04-04 DIAGNOSIS — Z78.0 POSTMENOPAUSAL STATUS: ICD-10-CM

## 2022-04-04 LAB
ALBUMIN SERPL-MCNC: 3.9 G/DL (ref 3.5–5)
ALP SERPL-CCNC: 51 U/L (ref 45–120)
ALT SERPL W P-5'-P-CCNC: 17 U/L (ref 0–45)
ANION GAP SERPL CALCULATED.3IONS-SCNC: 14 MMOL/L (ref 5–18)
AST SERPL W P-5'-P-CCNC: ABNORMAL U/L
BASOPHILS # BLD AUTO: 0 10E3/UL (ref 0–0.2)
BASOPHILS NFR BLD AUTO: 1 %
BILIRUB SERPL-MCNC: 0.8 MG/DL (ref 0–1)
BUN SERPL-MCNC: 15 MG/DL (ref 8–28)
CALCIUM SERPL-MCNC: 10.9 MG/DL (ref 8.5–10.5)
CHLORIDE BLD-SCNC: 103 MMOL/L (ref 98–107)
CHOLEST SERPL-MCNC: 178 MG/DL
CO2 SERPL-SCNC: 23 MMOL/L (ref 22–31)
CREAT SERPL-MCNC: 0.68 MG/DL (ref 0.6–1.1)
EOSINOPHIL # BLD AUTO: 0.1 10E3/UL (ref 0–0.7)
EOSINOPHIL NFR BLD AUTO: 2 %
ERYTHROCYTE [DISTWIDTH] IN BLOOD BY AUTOMATED COUNT: 13.5 % (ref 10–15)
FASTING STATUS PATIENT QL REPORTED: YES
GFR SERPL CREATININE-BSD FRML MDRD: 89 ML/MIN/1.73M2
GLUCOSE BLD-MCNC: 101 MG/DL (ref 70–125)
HCT VFR BLD AUTO: 42.5 % (ref 35–47)
HDLC SERPL-MCNC: 60 MG/DL
HGB BLD-MCNC: 14.4 G/DL (ref 11.7–15.7)
IMM GRANULOCYTES # BLD: 0 10E3/UL
IMM GRANULOCYTES NFR BLD: 0 %
LDLC SERPL CALC-MCNC: 104 MG/DL
LYMPHOCYTES # BLD AUTO: 1 10E3/UL (ref 0.8–5.3)
LYMPHOCYTES NFR BLD AUTO: 17 %
MCH RBC QN AUTO: 30.4 PG (ref 26.5–33)
MCHC RBC AUTO-ENTMCNC: 33.9 G/DL (ref 31.5–36.5)
MCV RBC AUTO: 90 FL (ref 78–100)
MONOCYTES # BLD AUTO: 0.4 10E3/UL (ref 0–1.3)
MONOCYTES NFR BLD AUTO: 7 %
NEUTROPHILS # BLD AUTO: 4.4 10E3/UL (ref 1.6–8.3)
NEUTROPHILS NFR BLD AUTO: 73 %
PLATELET # BLD AUTO: 177 10E3/UL (ref 150–450)
POTASSIUM BLD-SCNC: ABNORMAL MMOL/L
PROT SERPL-MCNC: 7.2 G/DL (ref 6–8)
PTH-INTACT SERPL-MCNC: 123 PG/ML (ref 10–86)
RBC # BLD AUTO: 4.73 10E6/UL (ref 3.8–5.2)
SODIUM SERPL-SCNC: 140 MMOL/L (ref 136–145)
T4 FREE SERPL-MCNC: 1.08 NG/DL (ref 0.7–1.8)
TRIGL SERPL-MCNC: 70 MG/DL
TSH SERPL DL<=0.005 MIU/L-ACNC: 5.31 UIU/ML (ref 0.3–5)
WBC # BLD AUTO: 6 10E3/UL (ref 4–11)

## 2022-04-04 PROCEDURE — 85025 COMPLETE CBC W/AUTO DIFF WBC: CPT | Performed by: FAMILY MEDICINE

## 2022-04-04 PROCEDURE — 84460 ALANINE AMINO (ALT) (SGPT): CPT | Performed by: FAMILY MEDICINE

## 2022-04-04 PROCEDURE — 82565 ASSAY OF CREATININE: CPT | Performed by: FAMILY MEDICINE

## 2022-04-04 PROCEDURE — 82374 ASSAY BLOOD CARBON DIOXIDE: CPT | Performed by: FAMILY MEDICINE

## 2022-04-04 PROCEDURE — 84075 ASSAY ALKALINE PHOSPHATASE: CPT | Performed by: FAMILY MEDICINE

## 2022-04-04 PROCEDURE — 82306 VITAMIN D 25 HYDROXY: CPT | Performed by: FAMILY MEDICINE

## 2022-04-04 PROCEDURE — 80061 LIPID PANEL: CPT | Performed by: FAMILY MEDICINE

## 2022-04-04 PROCEDURE — 99397 PER PM REEVAL EST PAT 65+ YR: CPT | Performed by: FAMILY MEDICINE

## 2022-04-04 PROCEDURE — 84295 ASSAY OF SERUM SODIUM: CPT | Performed by: FAMILY MEDICINE

## 2022-04-04 PROCEDURE — 82947 ASSAY GLUCOSE BLOOD QUANT: CPT | Performed by: FAMILY MEDICINE

## 2022-04-04 PROCEDURE — 82435 ASSAY OF BLOOD CHLORIDE: CPT | Performed by: FAMILY MEDICINE

## 2022-04-04 PROCEDURE — 84520 ASSAY OF UREA NITROGEN: CPT | Performed by: FAMILY MEDICINE

## 2022-04-04 PROCEDURE — 36415 COLL VENOUS BLD VENIPUNCTURE: CPT | Performed by: FAMILY MEDICINE

## 2022-04-04 PROCEDURE — 82247 BILIRUBIN TOTAL: CPT | Performed by: FAMILY MEDICINE

## 2022-04-04 PROCEDURE — 84155 ASSAY OF PROTEIN SERUM: CPT | Performed by: FAMILY MEDICINE

## 2022-04-04 PROCEDURE — 99214 OFFICE O/P EST MOD 30 MIN: CPT | Mod: 25 | Performed by: FAMILY MEDICINE

## 2022-04-04 PROCEDURE — 84439 ASSAY OF FREE THYROXINE: CPT | Performed by: FAMILY MEDICINE

## 2022-04-04 PROCEDURE — 82310 ASSAY OF CALCIUM: CPT | Performed by: FAMILY MEDICINE

## 2022-04-04 PROCEDURE — 84443 ASSAY THYROID STIM HORMONE: CPT | Performed by: FAMILY MEDICINE

## 2022-04-04 PROCEDURE — 83970 ASSAY OF PARATHORMONE: CPT | Performed by: FAMILY MEDICINE

## 2022-04-04 PROCEDURE — 82040 ASSAY OF SERUM ALBUMIN: CPT | Performed by: FAMILY MEDICINE

## 2022-04-04 RX ORDER — PRAVASTATIN SODIUM 20 MG
20 TABLET ORAL DAILY
Qty: 90 TABLET | Refills: 3 | Status: SHIPPED | OUTPATIENT
Start: 2022-04-04 | End: 2023-05-16

## 2022-04-04 RX ORDER — LEVOTHYROXINE SODIUM 25 UG/1
25 TABLET ORAL DAILY
Qty: 90 TABLET | Refills: 3 | Status: SHIPPED | OUTPATIENT
Start: 2022-04-04 | End: 2022-04-05

## 2022-04-04 RX ORDER — HYDROCHLOROTHIAZIDE 12.5 MG/1
12.5 TABLET ORAL DAILY
Qty: 90 TABLET | Refills: 3 | Status: SHIPPED | OUTPATIENT
Start: 2022-04-04 | End: 2022-10-04

## 2022-04-04 RX ORDER — TRIAMCINOLONE ACETONIDE 1 MG/G
OINTMENT TOPICAL 2 TIMES DAILY
Qty: 30 G | Refills: 1 | Status: SHIPPED | OUTPATIENT
Start: 2022-04-04

## 2022-04-04 ASSESSMENT — ACTIVITIES OF DAILY LIVING (ADL): CURRENT_FUNCTION: NO ASSISTANCE NEEDED

## 2022-04-04 NOTE — PROGRESS NOTES
ASSESSMENT / PLAN:       ICD-10-CM    1. Encounter for screening mammogram for breast cancer  Z12.31 *MA Screening Digital Bilateral   2. Mixed hyperlipidemia  E78.2 pravastatin (PRAVACHOL) 20 MG tablet     Lipid panel     Lipid panel   3. Trochanteric bursitis of right hip  M70.61    4. Encounter for Medicare annual wellness exam  Z00.00 CBC with platelets and differential     CBC with platelets and differential   5. Hypercalcemia  E83.52 Parathyroid Hormone Intact     Vitamin D Deficiency     Parathyroid Hormone Intact     Vitamin D Deficiency   6. Essential hypertension  I10 hydrochlorothiazide (HYDRODIURIL) 12.5 MG tablet     Comprehensive metabolic panel (BMP + Alb, Alk Phos, ALT, AST, Total. Bili, TP)     Comprehensive metabolic panel (BMP + Alb, Alk Phos, ALT, AST, Total. Bili, TP)   7. Hypothyroidism, unspecified type  E03.9 levothyroxine (SYNTHROID/LEVOTHROID) 25 MCG tablet     TSH with free T4 reflex     TSH with free T4 reflex   8. Postmenopausal status  Z78.0 DX Hip/Pelvis/Spine   9. Eczema, unspecified type  L30.9 triamcinolone (KENALOG) 0.1 % external ointment     Medical decision making: Patient here today for annual wellness visit.  Following medical problems addressed    1: Hypertension: Blood pressure is stable.  Continue on current dose of hydrochlorothiazide 12.5 mg daily.  It was initially high but subsequently went low at the end of the visit.  2: Hypothyroidism: Followed with endocrinology last year and the recommendation was to alternate 25 mcg with 50 mcg of levothyroxine noting borderline TSH.  Patient did not follow the directions.  Discussed that we will recheck the TSH today and I will send her directions to follow.  Patient verbalizes understanding  3.:  Hypercalcemia: Patient with primary hyperparathyroidism likely due to elevated PTH and was being followed with endocrinology.  The endocrinologist has left [place] and patient has not had a follow-up.  Reviewed the specialist  "note that mentions rechecking in 6 months.  Discussed this with the patient.  If PTH remains high, I will refer her back to endocrinology.  Patient verbalizes understanding.  4: Osteopenia.  Discussed last DEXA scan and need for repeat DEXA scan.  Patient will follow through.  5: Eczema: With history it looks like possible contact dermatitis.  Use Kenalog intermittently as before.  Continue with good skin cares.  Patient had old Pred pack and wondering if she needs it.  Discussed long-term side effects and it is better to use topical preparation.  6: Trochanteric bursitis: Currently not in a flareup.  Discussed cortisone injections if needed.  Patient informs me that they have never helped in the past.  7: Hyperlipidemia: Continue pravastatin.  Will check lipid panel today.  8: Healthcare maintenance: Patient will be pursuing a colonoscopy later this year and plans to continue mammograms for now.      COUNSELING:  Reviewed preventive health counseling, as reflected in patient instructions       Regular exercise       Healthy diet/nutrition       Vision screening       Hearing screening       Dental care       Osteoporosis prevention/bone health       Colon cancer screening    Estimated body mass index is 31.12 kg/m  as calculated from the following:    Height as of this encounter: 1.651 m (5' 5\").    Weight as of this encounter: 84.8 kg (187 lb).        She reports that she has never smoked. She has never used smokeless tobacco.      Appropriate preventive services were discussed with this patient, including applicable screening as appropriate for cardiovascular disease, diabetes, osteopenia/osteoporosis, and glaucoma.  As appropriate for age/gender, discussed screening for colorectal cancer, prostate cancer, breast cancer, and cervical cancer. Checklist reviewing preventive services available has been given to the patient.    Reviewed patients plan of care and provided an AVS. The Basic Care Plan (routine screening " "as documented in Health Maintenance) for Kat meets the Care Plan requirement. This Care Plan has been established and reviewed with the Patient.      SUBJECTIVE:   Kat Diego is a 76 year old female who presents for Preventive Visit.  Chief Complaint   Patient presents with     Wellness Visit     Pt is fasting today, pt wants to talk about an ongoing skin issue, also has been working on weight loss.  Found out this past year she has dry eye in the left eye and brusitis.      Patient has been advised of split billing requirements and indicates understanding: Yes  Are you in the first 12 months of your Medicare coverage?  No    Healthy Habits:     In general, how would you rate your overall health?  Excellent    Frequency of exercise:  2-3 days/week    Duration of exercise:  15-30 minutes    Do you usually eat at least 4 servings of fruit and vegetables a day, include whole grains    & fiber and avoid regularly eating high fat or \"junk\" foods?  Yes    Taking medications regularly:  Yes    Barriers to taking medications:  None    Medication side effects:  None    Ability to successfully perform activities of daily living:  No assistance needed    Home Safety:  No safety concerns identified    Hearing Impairment:  No hearing concerns    In the past 6 months, have you been bothered by leaking of urine? Yes    In general, how would you rate your overall mental or emotional health?  Excellent      PHQ-2 Total Score: 0    Additional concerns today:  Yes    Do you feel safe in your environment? Yes    Have you ever done Advance Care Planning? (For example, a Health Directive, POLST, or a discussion with a medical provider or your loved ones about your wishes): Yes, patient states has an Advance Care Planning document and will bring a copy to the clinic.       Fall risk  Fallen 2 or more times in the past year?: No  Any fall with injury in the past year?: No    Cognitive Screening   1) Repeat 3 items (Leader, " Season, Table)    2) Clock draw: NORMAL  3) 3 item recall: Recalls 3 objects  Results: 3 items recalled: COGNITIVE IMPAIRMENT LESS LIKELY    Mini-CogTM Copyright KLAUS Rutherford. Licensed by the author for use in [facility]; reprinted with permission ([place]@[facility]). All rights reserved.      Do you have sleep apnea, excessive snoring or daytime drowsiness?: no    Reviewed and updated as needed this visit by clinical staff   Tobacco  Allergies  Meds  Problems  Med Hx  Surg Hx  Fam Hx          Reviewed and updated as needed this visit by Provider   Tobacco  Allergies  Meds  Problems  Med Hx  Surg Hx  Fam Hx         Social History     Tobacco Use     Smoking status: Never Smoker     Smokeless tobacco: Never Used   Substance Use Topics     Alcohol use: Yes     Comment: Alcoholic Drinks/day: rarely     If you drink alcohol do you typically have >3 drinks per day or >7 drinks per week? No    Alcohol Use 4/4/2022   Prescreen: >3 drinks/day or >7 drinks/week? No   Prescreen: >3 drinks/day or >7 drinks/week? -   No flowsheet data found.      Current providers sharing in care for this patient include:   Patient Care Team:  Kimmie Cain MD as PCP - General  Kimmie Cain MD as Referring Physician (Family Practice)  Benigno Camejo MD as Assigned Endocrinology Provider  Kimmie Cain MD as Assigned PCP    The following health maintenance items are reviewed in Epic and correct as of today:  Health Maintenance Due   Topic Date Due     ANNUAL REVIEW OF HM ORDERS  Never done     FALL RISK ASSESSMENT  03/29/2022     BP Readings from Last 3 Encounters:   04/04/22 135/61   03/29/21 133/79   09/21/20 131/77    Wt Readings from Last 3 Encounters:   04/04/22 84.8 kg (187 lb)   03/29/21 86.6 kg (191 lb)   09/21/20 83.6 kg (184 lb 3.2 oz)                  Patient Active Problem List   Diagnosis     Hypercalcemia     Acute blood loss anemia     Colon polyps     Essential hypertension     History of  left knee replacement     Hypothyroidism     Knee osteoarthritis     Past Surgical History:   Procedure Laterality Date     DENTAL SURGERY       DILATION AND CURETTAGE       TOTAL KNEE ARTHROPLASTY Left 2015    [facility]; Rolf Helton MD     WISDOM TOOTH EXTRACTION       [facility] TOTAL KNEE ARTHROPLASTY Left 2015    Procedure: KNEE TOTAL ARTHROPLASTY, LEFT;  Surgeon: Rolf Helton MD;  Location: [facility];  Service: Orthopedics     [facility] TOTAL KNEE ARTHROPLASTY Right 2016    Procedure: RIGHT KNEE TOTAL ARTHROPLASTY;  Surgeon: Rolf Helton MD;  Location: [facility];  Service: Orthopedics       Social History     Tobacco Use     Smoking status: Never Smoker     Smokeless tobacco: Never Used   Substance Use Topics     Alcohol use: Yes     Comment: Alcoholic Drinks/day: rarely     Family History   Problem Relation Age of Onset     Heart Disease Mother      Cerebrovascular Disease Mother      Colon Cancer Father      Hyperlipidemia Sister      Hypertension Sister      Heart Disease Brother 60.00        Massive MI,  in sleep         Current Outpatient Medications   Medication Sig Dispense Refill     co-enzyme Q-10 100 MG CAPS capsule Take 100 mg by mouth       hydrochlorothiazide (HYDRODIURIL) 12.5 MG tablet Take 1 tablet (12.5 mg) by mouth daily 90 tablet 3     hydrocortisone 2.5 % cream APPLY TO AFFECTED AREA TWICE A DAY       levothyroxine (SYNTHROID/LEVOTHROID) 25 MCG tablet Take 1 tablet (25 mcg) by mouth daily 90 tablet 3     magnesium oxide (MAG-OX) 400 (241.3 Mg) MG tablet Take 325 mg by mouth       multivitamin (CENTRUM SILVER) tablet Take 1 tablet by mouth daily       niacin (SLO-NIACIN) 500 MG CR tablet Take 500 mg by mouth       Omega-3 1000 MG capsule Take 2 g by mouth       pravastatin (PRAVACHOL) 20 MG tablet Take 1 tablet (20 mg) by mouth daily 90 tablet 3     triamcinolone (KENALOG) 0.1 % external ointment Apply topically 2 times daily 30 g 1      "vitamin (B COMPLEX) capsule Take 1 capsule by mouth every other day            Mammogram Screening - Patient over age 75, has elected to continue with screening.  Pertinent mammograms are reviewed under the imaging tab.    Review of Systems  Constitutional, HEENT, cardiovascular, pulmonary, gi and gu systems are negative, except as otherwise noted.    OBJECTIVE:   /61 (BP Location: Left arm, Patient Position: Sitting, Cuff Size: Adult Large)   Pulse 72   Ht 1.651 m (5' 5\")   Wt 84.8 kg (187 lb)   SpO2 98%   BMI 31.12 kg/m   Estimated body mass index is 31.12 kg/m  as calculated from the following:    Height as of this encounter: 1.651 m (5' 5\").    Weight as of this encounter: 84.8 kg (187 lb).  Physical Exam  GENERAL: healthy, alert and no distress  NECK: no adenopathy, no asymmetry, masses, or scars and thyroid normal to palpation  RESP: lungs clear to auscultation - no rales, rhonchi or wheezes  CV: regular rate and rhythm, normal S1 S2, no S3 or S4, no murmur, click or rub, no peripheral edema and peripheral pulses strong  ABDOMEN: soft, nontender, no hepatosplenomegaly, no masses and bowel sounds normal  MS: no gross musculoskeletal defects noted, no edema    Diagnostic Test Results:  Labs reviewed in Epic  Results for orders placed or performed in visit on 04/04/22 (from the past 24 hour(s))   CBC with platelets and differential    Narrative    The following orders were created for panel order CBC with platelets and differential.  Procedure                               Abnormality         Status                     ---------                               -----------         ------                     CBC with platelets and d...[[id number]]                      Final result                 Please view results for these tests on the individual orders.   CBC with platelets and differential   Result Value Ref Range    WBC Count 6.0 4.0 - 11.0 10e3/uL    RBC Count 4.73 3.80 - 5.20 10e6/uL    Hemoglobin " 14.4 11.7 - 15.7 g/dL    Hematocrit 42.5 35.0 - 47.0 %    MCV 90 78 - 100 fL    MCH 30.4 26.5 - 33.0 pg    MCHC 33.9 31.5 - 36.5 g/dL    RDW 13.5 10.0 - 15.0 %    Platelet Count 177 150 - 450 10e3/uL    % Neutrophils 73 %    % Lymphocytes 17 %    % Monocytes 7 %    % Eosinophils 2 %    % Basophils 1 %    % Immature Granulocytes 0 %    Absolute Neutrophils 4.4 1.6 - 8.3 10e3/uL    Absolute Lymphocytes 1.0 0.8 - 5.3 10e3/uL    Absolute Monocytes 0.4 0.0 - 1.3 10e3/uL    Absolute Eosinophils 0.1 0.0 - 0.7 10e3/uL    Absolute Basophils 0.0 0.0 - 0.2 10e3/uL    Absolute Immature Granulocytes 0.0 <=0.4 10e3/uL     Counseling Resources:  ATP IV Guidelines  Pooled Cohorts Equation Calculator  Breast Cancer Risk Calculator  Breast Cancer: Medication to Reduce Risk  FRAX Risk Assessment  ICSI Preventive Guidelines  Dietary Guidelines for Americans, 2010  [employer] MyPlate  ASA Prophylaxis  Lung CA Screening    Kimmie Cain MD  [facility]    Identified Health Risks:    Information on urinary incontinence and treatment options given to patient.

## 2022-04-04 NOTE — PATIENT INSTRUCTIONS
Patient Education   Personalized Prevention Plan  You are due for the preventive services outlined below.  Your care team is available to assist you in scheduling these services.  If you have already completed any of these items, please share that information with your care team to update in your medical record.  Health Maintenance Due   Topic Date Due     ANNUAL REVIEW OF  ORDERS  Never done     FALL RISK ASSESSMENT  03/29/2022       Urinary Incontinence, Female (Adult)   Urinary incontinence means loss of bladder control. This problem affects many women, especially as they get older. If you have incontinence, you may be embarrassed to ask for help. But know that this problem can be treated.   Types of Incontinence  There are different types of incontinence. Two of the main types are described here. You can have more than one type.     Stress incontinence. With this type, urine leaks when pressure (stress) is put on the bladder. This may happen when you cough, sneeze, or laugh. Stress incontinence most often occurs because the pelvic floor muscles that support the bladder and urethra are weak. This can happen after pregnancy and vaginal childbirth or a hysterectomy. It can also be due to excess body weight or hormone changes.    Urge incontinence (also called overactive bladder). With this type, a sudden urge to urinate is felt often. This may happen even though there may not be much urine in the bladder. The need to urinate often during the night is common. Urge incontinence most often occurs because of bladder spasms. This may be due to bladder irritation or infection. Damage to bladder nerves or pelvic muscles, constipation, and certain medicines can also lead to urge incontinence.  Treatment depends on the cause. Further evaluation is needed to find the type you have. This will likely include an exam and certain tests. Based on the results, you and your healthcare provider can then plan treatment. Until a  diagnosis is made, the home care tips below can help ease symptoms.   Home care    Do pelvic floor muscle exercises, if they are prescribed. The pelvic floor muscles help support the bladder and urethra. Many women find that their symptoms improve when doing special exercises that strengthen these muscles. To do the exercises, contract the muscles you would use to stop your stream of urine. But do this when you re not urinating. Hold for 10 seconds, then relax. Repeat 10 to 20 times in a row, at least 3 times a day. Your healthcare provider may give you other instructions for how to do the exercises and how often.    Keep a bladder diary. This helps track how often and how much you urinate over a set period of time. Bring this diary with you to your next visit with the provider. The information can help your provider learn more about your bladder problem.    Lose weight, if advised to by your provider. Extra weight puts pressure on the bladder. Your provider can help you create a weight-loss plan that s right for you. This may include exercising more and making certain diet changes.    Don't have foods and drinks that may irritate the bladder. These can include alcohol and caffeinated drinks.    Quit smoking. Smoking and other tobacco use can lead to a long-term (chronic) cough that strains the pelvic floor muscles. Smoking may also damage the bladder and urethra. Talk with your provider about treatments or methods you can use to quit smoking.    If drinking large amounts of fluid makes you have symptoms, you may be advised to limit your fluid intake. You may also be advised to drink most of your fluids during the day and to limit fluids at night.    If you re worried about urine leakage or accidents, you may wear absorbent pads to catch urine. Change the pads often. This helps reduce discomfort. It may also reduce the risk of skin or bladder infections.    Follow-up care  Follow up with your healthcare provider, or  as directed. It may take some to find the right treatment for your problem. But healthy lifestyle changes can be made right away. These include such things as exercising on a regular basis, eating a healthy diet, losing weight (if needed), and quitting smoking. Your treatment plan may include special therapies or medicines. Certain procedures or surgery may also be options. Talk about any questions you have with your provider.   When to seek medical advice  Call the healthcare provider right away if any of these occur:    Fever of 100.4 F (38 C) or higher, or as directed by your provider    Bladder pain or fullness    Belly swelling    Nausea or vomiting    Back pain    Weakness, dizziness, or fainting  [employer] last reviewed this educational content on 1/1/2020 2000-2021 [employer]. All rights reserved. This information is not intended as a substitute for professional medical care. Always follow your healthcare professional's instructions.

## 2022-04-05 DIAGNOSIS — E03.9 HYPOTHYROIDISM, UNSPECIFIED TYPE: Primary | ICD-10-CM

## 2022-04-05 DIAGNOSIS — E03.9 HYPOTHYROIDISM, UNSPECIFIED TYPE: ICD-10-CM

## 2022-04-05 DIAGNOSIS — E83.52 HYPERCALCEMIA: ICD-10-CM

## 2022-04-05 LAB — DEPRECATED CALCIDIOL+CALCIFEROL SERPL-MC: 52 UG/L (ref 20–75)

## 2022-04-05 RX ORDER — LEVOTHYROXINE SODIUM 25 UG/1
25 TABLET ORAL DAILY
Qty: 135 TABLET | Refills: 3 | Status: SHIPPED | OUTPATIENT
Start: 2022-04-05 | End: 2022-10-04 | Stop reason: DRUGHIGH

## 2022-06-22 ENCOUNTER — TRANSFERRED RECORDS (OUTPATIENT)
Dept: HEALTH INFORMATION MANAGEMENT | Facility: CLINIC | Age: 77
End: 2022-06-22

## 2022-09-16 ENCOUNTER — ANCILLARY PROCEDURE (OUTPATIENT)
Dept: MAMMOGRAPHY | Facility: CLINIC | Age: 77
End: 2022-09-16
Attending: FAMILY MEDICINE
Payer: COMMERCIAL

## 2022-09-16 DIAGNOSIS — Z12.31 ENCOUNTER FOR SCREENING MAMMOGRAM FOR BREAST CANCER: ICD-10-CM

## 2022-09-16 PROCEDURE — 77067 SCR MAMMO BI INCL CAD: CPT

## 2022-09-19 ENCOUNTER — HOSPITAL ENCOUNTER (OUTPATIENT)
Dept: BONE DENSITY | Facility: HOSPITAL | Age: 77
Discharge: HOME | End: 2022-09-19
Attending: FAMILY MEDICINE | Admitting: FAMILY MEDICINE
Payer: COMMERCIAL

## 2022-09-19 DIAGNOSIS — Z78.0 POSTMENOPAUSAL STATUS: ICD-10-CM

## 2022-09-19 PROCEDURE — 77080 DXA BONE DENSITY AXIAL: CPT

## 2022-09-20 DIAGNOSIS — M85.80 OSTEOPENIA, UNSPECIFIED LOCATION: Primary | ICD-10-CM

## 2022-09-23 ENCOUNTER — LAB (OUTPATIENT)
Dept: LAB | Facility: CLINIC | Age: 77
End: 2022-09-23

## 2022-09-23 ENCOUNTER — OFFICE VISIT (OUTPATIENT)
Dept: ENDOCRINOLOGY | Facility: CLINIC | Age: 77
End: 2022-09-23
Attending: FAMILY MEDICINE
Payer: COMMERCIAL

## 2022-09-23 ENCOUNTER — HOSPITAL ENCOUNTER (OUTPATIENT)
Dept: GENERAL RADIOLOGY | Facility: HOSPITAL | Age: 77
Discharge: HOME | End: 2022-09-23
Attending: INTERNAL MEDICINE
Payer: COMMERCIAL

## 2022-09-23 VITALS
WEIGHT: 187.4 LBS | DIASTOLIC BLOOD PRESSURE: 69 MMHG | HEART RATE: 72 BPM | SYSTOLIC BLOOD PRESSURE: 146 MMHG | BODY MASS INDEX: 31.18 KG/M2

## 2022-09-23 DIAGNOSIS — E83.52 HYPERCALCEMIA: ICD-10-CM

## 2022-09-23 DIAGNOSIS — E03.9 HYPOTHYROIDISM, UNSPECIFIED TYPE: ICD-10-CM

## 2022-09-23 DIAGNOSIS — E21.0 PRIMARY HYPERPARATHYROIDISM (H): ICD-10-CM

## 2022-09-23 DIAGNOSIS — E83.52 HYPERCALCEMIA: Primary | ICD-10-CM

## 2022-09-23 DIAGNOSIS — M85.80 OSTEOPENIA, UNSPECIFIED LOCATION: ICD-10-CM

## 2022-09-23 LAB
ALBUMIN SERPL BCG-MCNC: 4.3 G/DL (ref 3.5–5.2)
ALP SERPL-CCNC: 60 U/L (ref 35–104)
ALT SERPL W P-5'-P-CCNC: 17 U/L (ref 10–35)
ANION GAP SERPL CALCULATED.3IONS-SCNC: 8 MMOL/L (ref 7–15)
AST SERPL W P-5'-P-CCNC: ABNORMAL U/L
BILIRUB SERPL-MCNC: 0.6 MG/DL
BUN SERPL-MCNC: 19.3 MG/DL (ref 8–23)
CALCIUM SERPL-MCNC: 10.6 MG/DL (ref 8.8–10.2)
CHLORIDE SERPL-SCNC: 103 MMOL/L (ref 98–107)
CREAT SERPL-MCNC: 0.55 MG/DL (ref 0.51–0.95)
DEPRECATED CALCIDIOL+CALCIFEROL SERPL-MC: 55 UG/L (ref 20–75)
DEPRECATED HCO3 PLAS-SCNC: 29 MMOL/L (ref 22–29)
GFR SERPL CREATININE-BSD FRML MDRD: >90 ML/MIN/1.73M2
GLUCOSE SERPL-MCNC: 103 MG/DL (ref 70–99)
PHOSPHATE SERPL-MCNC: 2.8 MG/DL (ref 2.5–4.5)
POTASSIUM SERPL-SCNC: 4.9 MMOL/L (ref 3.4–5.3)
PROT SERPL-MCNC: 7.5 G/DL (ref 6.4–8.3)
PTH-INTACT SERPL-MCNC: 72 PG/ML (ref 15–65)
SODIUM SERPL-SCNC: 140 MMOL/L (ref 136–145)
T4 FREE SERPL-MCNC: 1.46 NG/DL (ref 0.9–1.7)
TSH SERPL DL<=0.005 MIU/L-ACNC: 4.36 UIU/ML (ref 0.3–4.2)

## 2022-09-23 PROCEDURE — 99215 OFFICE O/P EST HI 40 MIN: CPT | Performed by: INTERNAL MEDICINE

## 2022-09-23 PROCEDURE — 84460 ALANINE AMINO (ALT) (SGPT): CPT

## 2022-09-23 PROCEDURE — 84443 ASSAY THYROID STIM HORMONE: CPT

## 2022-09-23 PROCEDURE — 84439 ASSAY OF FREE THYROXINE: CPT

## 2022-09-23 PROCEDURE — 82306 VITAMIN D 25 HYDROXY: CPT

## 2022-09-23 PROCEDURE — 82247 BILIRUBIN TOTAL: CPT

## 2022-09-23 PROCEDURE — 84155 ASSAY OF PROTEIN SERUM: CPT

## 2022-09-23 PROCEDURE — 80069 RENAL FUNCTION PANEL: CPT

## 2022-09-23 PROCEDURE — 36415 COLL VENOUS BLD VENIPUNCTURE: CPT

## 2022-09-23 PROCEDURE — 99417 PROLNG OP E/M EACH 15 MIN: CPT | Performed by: INTERNAL MEDICINE

## 2022-09-23 PROCEDURE — 72100 X-RAY EXAM L-S SPINE 2/3 VWS: CPT | Mod: FY

## 2022-09-23 PROCEDURE — 84075 ASSAY ALKALINE PHOSPHATASE: CPT

## 2022-09-23 PROCEDURE — 72070 X-RAY EXAM THORAC SPINE 2VWS: CPT | Mod: FY

## 2022-09-23 PROCEDURE — 83970 ASSAY OF PARATHORMONE: CPT

## 2022-09-23 NOTE — PATIENT INSTRUCTIONS
-Labs today  -Spine x-rays today  -Schedule forearm DXA scan (do not need scans of other sites); please let us know if having difficulty scheduling  -Continue dietary intake of calcium and continue vitamin D supplement without changes  -I will discuss possibly replacing HCTZ with an alternate blood pressure medication with Dr. Cain  -Return for a follow-up visit in 3 months  -We will communicate results via [email or web address], or if needed by phone

## 2022-09-23 NOTE — LETTER
9/23/2022         RE: Kat Diego  [address]  [facility] [address]        Dear Colleague,    Thank you for referring your patient, Kat Diego, to the [facility]. Please see a copy of my visit note below.      ENDOCRINOLOGY NEW PATIENT VISIT      HISTORY OF PRESENT ILLNESS    Kat Diego is a former patient of Dr. Mason, here to establish care.     1. Hypercalcemia.  Patient has history of hypercalcemia dating back to 2016. Also later found to have elevated PTH. Patient denies symptoms of hypercalcemia including confusion, mental status changes, GI upset, constipation, and fatigue.     She was previously on calcium supplement but discontinued in 04/2021. Patient currently eats 4 servings of dairy/day and has diet rich in leafy greens.     Takes 1000 IU vitamin D daily.     Notably, patient takes hydrochlorothiazide for hypertension.     No history of nephrolithiasis.    No history of treatment with lithium.    Patient notes her sister also has hypercalcemia.     2. Osteopenia  Patient has history of osteopenia, with last DXA scan in 09/2022 (see below). Patient has history of mechanical fall on ice, but no fractures. Underwent menopause in late 40s, has 10 year history of OCP use and 1 child. 20 year history of hydrocortisone use for eczema, taken mostly topically. No history of oral steroid use >3 months. Mother and sister have osteopenia. Works out at [employer] 3-4x/week. Patient notes history of loss of height: previously 5'7 but now 5'5 following knee replacement. No history of assessment for vertebral fractures. Patient notes chronic low back pain, and has osteoarthritis.     Most recent DXA scan was performed on 9/19/2022.  Images personally reviewed.  -L2-L4 T score 1.1.  Discrepant BMD at L1.  Nonetheless, and interpretation L1-L4 was compared to prior study and showed 1.2% decline in BMD compared to 6/7/2019.  -Left femoral neck T score -1.2, left  total hip T score -0.6.  1.2% increase in BMD compared to 6/7/2019.  -Right femoral neck T score -0.8, right total hip T score -0.7.  0.1% increase in BMD compared to 6/7/2019.    3. Hypothyroidism  Patient has a history of  Hypothyroidism and is on levothyroxine. Dose increased from 25 mcg to 25 mcg and 50 mcg alternating every other day in 04/2022. She has not had repeat TFTs since increasing her dose. Patient states she has good energy, and has never been symptomatic from a thyroid standpoint. Patient notes her mother, sister, and niece all have thyroid disease.     Pertinent Social History: Patient is . She has one son and step children with whom she is close with. 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 Patient has an active social life and enjoys her [employer] fitness class, golfing, having lunch with friends, and watching movies.     PAST MEDICAL HISTORY  Past Medical History:   Diagnosis Date     Allergic rhinitis      Atopic dermatitis      Disease of thyroid gland     hypo     Diverticul disease small and large intestine, no perforati or abscess      Rosacea        MEDICATIONS  Current Outpatient Medications   Medication Sig Dispense Refill     co-enzyme Q-10 100 MG CAPS capsule Take 100 mg by mouth       hydrochlorothiazide (HYDRODIURIL) 12.5 MG tablet Take 1 tablet (12.5 mg) by mouth daily 90 tablet 3     hydrocortisone 2.5 % cream APPLY TO AFFECTED AREA TWICE A DAY       levothyroxine (SYNTHROID/LEVOTHROID) 25 MCG tablet Take 1 tablet (25 mcg) by mouth daily 135 tablet 3     magnesium oxide (MAG-OX) 400 (241.3 Mg) MG tablet Take 325 mg by mouth       multivitamin (CENTRUM SILVER) tablet Take 1 tablet by mouth daily       niacin (SLO-NIACIN) 500 MG CR tablet Take 500 mg by mouth       Omega-3 1000 MG capsule Take 2 g by mouth       pravastatin (PRAVACHOL) 20 MG tablet Take 1 tablet (20 mg) by mouth daily 90 tablet 3     triamcinolone (KENALOG) 0.1 % external ointment Apply topically 2  times daily 30 g 1     vitamin (B COMPLEX) capsule Take 1 capsule by mouth every other day          Allergies, family, and social history were reviewed and documented as needed in EHR.     REVIEW OF SYSTEMS  A focused ROS was performed, with pertinent positives and negatives as noted in the HPI.    PHYSICAL EXAM  BP (!) 146/69 (BP Location: Right arm, Patient Position: Sitting)   Pulse 72   Wt 85 kg (187 lb 6.4 oz)   BMI 31.18 kg/m    Body mass index is 31.18 kg/m .  Constitutional: Vital signs reviewed, as recorded above. Patient is alert, oriented and appears in no acute distress.  Eyes: PER, EOMI, no stare, lid lag, or retraction; no conjunctival injection.  ENMT: OP clear with moist MM. Lips are without lesions.   Neck: Neck supple, no palpable thyromegaly.  Lymphatic: No cervical or supraclavicular LAD.  Cardiovascular: RRR, normal S1/S2, compression socks on.  Respiratory: CTAB, without wheezes, crackles or rhonchi; normal chest wall motion and respiratory effort.  GI: Positive bowel sounds, soft, NT/ND.  MSK: No clubbing or cyanosis; normal muscle bulk and tone.  Skin: Normal skin color, temperature, turgor and texture, no purple striae.  Neurological: Alert and oriented times 3. No tremor.      DATA REVIEW  Each of the following laboratory and/or imaging studies were reviewed.    Office Visit on 04/04/2022   Component Date Value Ref Range Status     Sodium 04/04/2022 140  136 - 145 mmol/L Final     Potassium 04/04/2022    Final    Specimen hemolyzed, result invalid     Chloride 04/04/2022 103  98 - 107 mmol/L Final     Carbon Dioxide (CO2) 04/04/2022 23  22 - 31 mmol/L Final     Anion Gap 04/04/2022 14  5 - 18 mmol/L Final     Urea Nitrogen 04/04/2022 15  8 - 28 mg/dL Final     Creatinine 04/04/2022 0.68  0.60 - 1.10 mg/dL Final     Calcium 04/04/2022 10.9 (A) 8.5 - 10.5 mg/dL Final     Glucose 04/04/2022 101  70 - 125 mg/dL Final     Alkaline Phosphatase 04/04/2022 51  45 - 120 U/L Final     AST  04/04/2022    Final    Specimen hemolyzed, result invalid     ALT 04/04/2022 17  0 - 45 U/L Final     Protein Total 04/04/2022 7.2  6.0 - 8.0 g/dL Final     Albumin 04/04/2022 3.9  3.5 - 5.0 g/dL Final     Bilirubin Total 04/04/2022 0.8  0.0 - 1.0 mg/dL Final     GFR Estimate 04/04/2022 89  >60 mL/min/1.73m2 Final    Effective December 21, 2021 eGFRcr in adults is calculated using the 2021 CKD-EPI creatinine equation which includes age and gender (Senait et al., NE, DOI: 10.1056/HGBIfi7265025)     Cholesterol 04/04/2022 178  <=199 mg/dL Final     Triglycerides 04/04/2022 70  <=149 mg/dL Final     Direct Measure HDL 04/04/2022 60  >=50 mg/dL Final    HDL Cholesterol Reference Range:     0-2 years:   No reference ranges established for patients under 2 years old  at [employer] for lipid analytes.    2-8 years:  Greater than 45 mg/dL     18 years and older:   Female: Greater than or equal to 50 mg/dL   Male:   Greater than or equal to 40 mg/dL     LDL Cholesterol Calculated 04/04/2022 104  <=129 mg/dL Final     Patient Fasting > 8hrs? 04/04/2022 Yes   Final     Parathyroid Hormone Intact 04/04/2022 123 (A) 10 - 86 pg/mL Final     Vitamin D, Total (25-Hydroxy) 04/04/2022 52  20 - 75 ug/L Final     TSH 04/04/2022 5.31 (A) 0.30 - 5.00 uIU/mL Final     WBC Count 04/04/2022 6.0  4.0 - 11.0 10e3/uL Final     RBC Count 04/04/2022 4.73  3.80 - 5.20 10e6/uL Final     Hemoglobin 04/04/2022 14.4  11.7 - 15.7 g/dL Final     Hematocrit 04/04/2022 42.5  35.0 - 47.0 % Final     MCV 04/04/2022 90  78 - 100 fL Final     MCH 04/04/2022 30.4  26.5 - 33.0 pg Final     MCHC 04/04/2022 33.9  31.5 - 36.5 g/dL Final     RDW 04/04/2022 13.5  10.0 - 15.0 % Final     Platelet Count 04/04/2022 177  150 - 450 10e3/uL Final     % Neutrophils 04/04/2022 73  % Final     % Lymphocytes 04/04/2022 17  % Final     % Monocytes 04/04/2022 7  % Final     % Eosinophils 04/04/2022 2  % Final     % Basophils 04/04/2022 1  % Final     % Immature  Granulocytes 2022 0  % Final     Absolute Neutrophils 2022 4.4  1.6 - 8.3 10e3/uL Final     Absolute Lymphocytes 2022 1.0  0.8 - 5.3 10e3/uL Final     Absolute Monocytes 2022 0.4  0.0 - 1.3 10e3/uL Final     Absolute Eosinophils 2022 0.1  0.0 - 0.7 10e3/uL Final     Absolute Basophils 2022 0.0  0.0 - 0.2 10e3/uL Final     Absolute Immature Granulocytes 2022 0.0  <=0.4 10e3/uL Final     Free T4 2022 1.08  0.70 - 1.80 ng/dL Final    Performance of the Free T4 test has not been established with  specimens (<= 2 months of age).         ASSESSMENT  1.Primary hyperparathyroidism  Patient has a several year history of hypercalcemia in the presence of elevated PTH favoring primary hyperparathyroidism as the cause of her hypercalcemia. She is currently receiving plenty of calcium in her diet. She is on Vitamin D 1000 IU daily as well. Will check vitamin D level today to ensure it is in ideal range, would avoid excess vitamin D as it can exacerbate hypercalcemia. Notably, patient is also on hydrochlorothiazide, which could also be contributing to her hypercalcemia, although probably not the cause alone. Will reach out to her primary to see if alternate antihypertensive is an option. Can consider 24 hr urine collection once off hydrochlorothiazide in the future, especially if surgical intervention is considered. DXA of wrist also ordered to determine if patient has osteoporosis 2/2 hyperparathyroidism, which may not be evident in the DXA of the spine and hip she had done earlier this year.    At present, there is no indication for surgical intervention for primary hyperparathyroidism.  However, if our work-up reveals osteoporosis or an occult fracture (see discussion below), or if serum calcium is rising or renal function declining, surgical intervention can be reconsidered.    2. Osteopenia  Patient has a history of osteopenia with FRAX indicating 12% risk of major  osteoporotic fracture, and 2.2% risk of hip fracture in the next 10 years. Patient currently receiving adequate dietary calcium, and taking vitamin D supplementation. Will order DXA of wrist to rule out osteoporosis due to hyperparathyroidism.  Given significant height loss, would also screen for an occult vertebral fracture, which might change her diagnoses and be an indication for surgical intervention in primary hyperparathyroidism.    3. Hypothyroidism  Patient has a history of hypothyroidism on levothyroxine. Patient is clinically euthyroid, but dose of levothyroxine was increased to 25 mcg and 50 mcg alternating every other day in 04/2022 following an elevated TSH. Will obtain TFTs, as they have not been checked since her dose was increased.     PLAN  -Labs today  -Spine x-rays today  -Schedule forearm DXA scan (do not need scans of other sites); please let us know if having difficulty scheduling  -Continue dietary intake of calcium and continue vitamin D supplement without changes  -I will discuss possibly replacing HCTZ with an alternate blood pressure medication with Dr. Cain  -Ms. Diego will clarify her sister's history/cause of her hypercalcemia  -Return for a follow-up visit in 3 months  -We will communicate results via [employer], or if needed by phone    Orders Placed This Encounter   Procedures     XR Thoracic Spine 2 Views     XR Lumbar Spine 2/3 Views     DX Wrist Heel Radius     Vitamin D Deficiency     Phosphorus     Parathyroid Hormone Intact     Comprehensive metabolic panel     TSH with free T4 reflex       Marquita Ashby, MS4  [employer] [facility] [employer]      I was present with the medical student who participated in the service and in the documentation of the note. I have verified the history and personally performed the physical exam and medical decision making. I have made revisions to note as needed and agree with the assessment and plan of care as documented in the note.    I  personally spent a total of 70 minutes on the date of encounter reviewing medical records, evaluating the patient, coordinating care and documenting in the EHR, as detailed above.      Debi Snow MD   Division of Diabetes, Endocrinology and Metabolism  Department of Medicine      cc: Kimmie Cain MD              Again, thank you for allowing me to participate in the care of your patient.        Sincerely,        DEBI Snow MD

## 2022-09-24 ENCOUNTER — HEALTH MAINTENANCE LETTER (OUTPATIENT)
Age: 77
End: 2022-09-24

## 2022-09-25 ENCOUNTER — TELEPHONE (OUTPATIENT)
Dept: FAMILY MEDICINE | Facility: CLINIC | Age: 77
End: 2022-09-25

## 2022-09-25 ENCOUNTER — MYC MEDICAL ADVICE (OUTPATIENT)
Dept: FAMILY MEDICINE | Facility: CLINIC | Age: 77
End: 2022-09-25

## 2022-09-26 NOTE — TELEPHONE ENCOUNTER
Please inform patient that I have received an update from her endocrinologist.  There is a concern that hydrochlorothiazide may be also contributing to her hypercalcemia.  Perhaps, we should discuss alternative medication for her blood pressure.  I would like to consider lisinopril or amlodipine for her.  Please asked patient if she is familiar with this medication and willing to change her hydrochlorothiazide.  Or would she like to see me for a clinic visit or a televisit and please help schedule an appointment as needed.    I will wait to hear from her.    Kimmie Cain MD

## 2022-09-26 NOTE — TELEPHONE ENCOUNTER
----- Message from Jorge Snow MD sent at 9/23/2022  9:55 AM CDT -----    Hi Dr. Cain,    I saw Ms. Diego in clinic today--thank you for referring her to endocrinology.  I will copy you my consult note.  I wanted to check in with you to see if it would be feasible to replace hydrochlorothiazide with an alternate antihypertensive.  I suspect she has primary hyperparathyroidism, but I wonder how much hydrochlorothiazide may be exacerbating hypercalcemia for Ms. Diego (it also poses the risk of worsening hypercalcemia in the future).  I have told patient that she may hear from your clinic with information on whether you think a change to an alternate regimen would be advisable/possible and that she may need to see you in clinic to coordinate this transition.    Please let me know if any questions or concerns.    Best regards,  Crissy

## 2022-09-26 NOTE — TELEPHONE ENCOUNTER
Tried to schedule patient for Thursday virtual opening at 1:30 and patient states that will not work. Did you want to fit patient in sooner or can we push out till next week. Please advise.

## 2022-10-04 ENCOUNTER — VIRTUAL VISIT (OUTPATIENT)
Dept: FAMILY MEDICINE | Facility: CLINIC | Age: 77
End: 2022-10-04
Payer: COMMERCIAL

## 2022-10-04 DIAGNOSIS — E83.52 HYPERCALCEMIA: ICD-10-CM

## 2022-10-04 DIAGNOSIS — E03.9 HYPOTHYROIDISM, UNSPECIFIED TYPE: ICD-10-CM

## 2022-10-04 DIAGNOSIS — I10 ESSENTIAL HYPERTENSION: Primary | ICD-10-CM

## 2022-10-04 PROBLEM — Z96.651 HISTORY OF TOTAL RIGHT KNEE REPLACEMENT: Status: ACTIVE | Noted: 2022-10-04

## 2022-10-04 PROCEDURE — 99213 OFFICE O/P EST LOW 20 MIN: CPT | Mod: 95 | Performed by: FAMILY MEDICINE

## 2022-10-04 RX ORDER — LISINOPRIL 10 MG/1
10 TABLET ORAL DAILY
Qty: 90 TABLET | Refills: 3 | Status: SHIPPED | OUTPATIENT
Start: 2022-10-04 | End: 2022-11-07

## 2022-10-04 RX ORDER — LEVOTHYROXINE SODIUM 50 UG/1
50 TABLET ORAL DAILY
Qty: 90 TABLET | Refills: 1 | Status: SHIPPED | OUTPATIENT
Start: 2022-10-04 | End: 2023-01-04 | Stop reason: DRUGHIGH

## 2022-10-04 NOTE — PROGRESS NOTES
"Honey is a 76 year old who is being evaluated via a billable video visit.      How would you like to obtain your AVS? MyChart  If the video visit is dropped, the invitation should be resent by: Text to cell phone: [phone]  Will anyone else be joining your video visit? No        Assessment & Plan     ICD-10-CM    1. Essential hypertension  I10 lisinopril (ZESTRIL) 10 MG tablet   2. Hypothyroidism, unspecified type  E03.9    3. Hypercalcemia  E83.52      Medication making: Patient with hypothyroidism, hypercalcemia and elevated PTH presents today for medication management.  She had been on hydrochlorothiazide.  Endocrinology has asked to discontinue this as she will benefit from lower calcium level.  This is discussed with the patient.  We will change to lisinopril 10 mg.  Patient will come for follow-up in clinic in we will check the blood pressure.  Her other issues of thyroid are stable and she follows with endocrinology.  She is due for a pneumonia vaccine and that that is ordered for her    Reviewed recent lab work.  Reviewed notes from endocrinologist.         BMI:   Estimated body mass index is 31.18 kg/m  as calculated from the following:    Height as of 4/4/22: 1.651 m (5' 5\").    Weight as of 9/23/22: 85 kg (187 lb 6.4 oz).       MEDICATIONS:   Orders Placed This Encounter   Medications     lisinopril (ZESTRIL) 10 MG tablet     Sig: Take 1 tablet (10 mg) by mouth daily     Dispense:  90 tablet     Refill:  3     Medications Discontinued During This Encounter   Medication Reason     hydrochlorothiazide (HYDRODIURIL) 12.5 MG tablet           - Continue other medications without change    Return in about 4 weeks (around 11/1/2022) for Follow up.    Kimmie Cain MD  [facility]    Subjective   Honey is a 76 year old accompanied by her N/A, presenting for the following health issues:  Recheck Medication (Med check, pt also has questions for Dr. CONTRERAS )      History of Present " Illness       Hypothyroidism:     Since last visit, patient describes the following symptoms::  None    She eats 4 or more servings of fruits and vegetables daily.She consumes 0 sweetened beverage(s) daily. She exercises with enough effort to increase her heart rate 4 days per week.   She is taking medications regularly.     Patient Active Problem List   Diagnosis     Hypercalcemia     Acute blood loss anemia     Colon polyps     Essential hypertension     History of left knee replacement     Hypothyroidism     Knee osteoarthritis     Benign neoplasm of sigmoid colon     Diverticular disease of large intestine     History of total right knee replacement     Current Outpatient Medications   Medication     co-enzyme Q-10 100 MG CAPS capsule     hydrocortisone 2.5 % cream     levothyroxine (SYNTHROID/LEVOTHROID) 50 MCG tablet     lisinopril (ZESTRIL) 10 MG tablet     multivitamin (CENTRUM SILVER) tablet     niacin (SLO-NIACIN) 500 MG CR tablet     Omega-3 1000 MG capsule     pravastatin (PRAVACHOL) 20 MG tablet     triamcinolone (KENALOG) 0.1 % external ointment     vitamin (B COMPLEX) capsule     magnesium oxide (MAG-OX) 400 (241.3 Mg) MG tablet     UNABLE TO FIND     No current facility-administered medications for this visit.       Review of Systems   Constitutional, HEENT, cardiovascular, pulmonary, gi and gu systems are negative, except as otherwise noted.      Objective           Vitals:  No vitals were obtained today due to virtual visit.    Physical Exam   GENERAL: Healthy, alert and no distress  EYES: Eyes grossly normal to inspection.  No discharge or erythema, or obvious scleral/conjunctival abnormalities.  RESP: No audible wheeze, cough, or visible cyanosis.  No visible retractions or increased work of breathing.    SKIN: Visible skin clear. No significant rash, abnormal pigmentation or lesions.  NEURO: Cranial nerves grossly intact.  Mentation and speech appropriate for age.  PSYCH: Mentation appears  normal, affect normal/bright, judgement and insight intact, normal speech and appearance well-groomed.    Lab on 09/23/2022   Component Date Value Ref Range Status     Vitamin D, Total (25-Hydroxy) 09/23/2022 55  20 - 75 ug/L Final     Phosphorus 09/23/2022 2.8  2.5 - 4.5 mg/dL Final     Parathyroid Hormone Intact 09/23/2022 72 (A) 15 - 65 pg/mL Final     Sodium 09/23/2022 140  136 - 145 mmol/L Final     Potassium 09/23/2022 4.9  3.4 - 5.3 mmol/L Final    Specimen slightly hemolyzed, potassium may be falsely elevated.  Specimen slightly hemolyzed, potassium may be falsely elevated.     Chloride 09/23/2022 103  98 - 107 mmol/L Final     Carbon Dioxide (CO2) 09/23/2022 29  22 - 29 mmol/L Final     Anion Gap 09/23/2022 8  7 - 15 mmol/L Final     Urea Nitrogen 09/23/2022 19.3  8.0 - 23.0 mg/dL Final     Creatinine 09/23/2022 0.55  0.51 - 0.95 mg/dL Final     Calcium 09/23/2022 10.6 (A) 8.8 - 10.2 mg/dL Final     Glucose 09/23/2022 103 (A) 70 - 99 mg/dL Final     Alkaline Phosphatase 09/23/2022 60  35 - 104 U/L Final     AST 09/23/2022    Final    Unsatisfactory specimen - hemolyzed    Specimen is hemolyzed which can falsely elevate AST. Analysis of a non-hemolyzed specimen may result in a lower value.     ALT 09/23/2022 17  10 - 35 U/L Final     Protein Total 09/23/2022 7.5  6.4 - 8.3 g/dL Final     Albumin 09/23/2022 4.3  3.5 - 5.2 g/dL Final     Bilirubin Total 09/23/2022 0.6  <=1.2 mg/dL Final     GFR Estimate 09/23/2022 >90  >60 mL/min/1.73m2 Final    Effective December 21, 2021 eGFRcr in adults is calculated using the 2021 CKD-EPI creatinine equation which includes age and gender (Senait et al., NEJM, DOI: 10.1056/KZHRkg7078846)     TSH 09/23/2022 4.36 (A) 0.30 - 4.20 uIU/mL Final     Free T4 09/23/2022 1.46  0.90 - 1.70 ng/dL Final               Video-Visit Details    Video Start Time: 1:39    Type of service:  Video Visit    Video End Time:1:48 PM    Originating Location (pt. Location): Home    Distant Location  (provider location):  [facility]     Platform used for Video Visit: Jeanne

## 2022-11-07 ENCOUNTER — OFFICE VISIT (OUTPATIENT)
Dept: FAMILY MEDICINE | Facility: CLINIC | Age: 77
End: 2022-11-07
Payer: COMMERCIAL

## 2022-11-07 VITALS
WEIGHT: 188 LBS | SYSTOLIC BLOOD PRESSURE: 122 MMHG | OXYGEN SATURATION: 100 % | BODY MASS INDEX: 30.22 KG/M2 | HEART RATE: 83 BPM | DIASTOLIC BLOOD PRESSURE: 64 MMHG | HEIGHT: 66 IN

## 2022-11-07 DIAGNOSIS — E83.52 HYPERCALCEMIA: ICD-10-CM

## 2022-11-07 DIAGNOSIS — I10 ESSENTIAL HYPERTENSION: Primary | ICD-10-CM

## 2022-11-07 DIAGNOSIS — E03.9 ACQUIRED HYPOTHYROIDISM: ICD-10-CM

## 2022-11-07 LAB
ANION GAP SERPL CALCULATED.3IONS-SCNC: 10 MMOL/L (ref 7–15)
BUN SERPL-MCNC: 18.8 MG/DL (ref 8–23)
CALCIUM SERPL-MCNC: 10.1 MG/DL (ref 8.8–10.2)
CHLORIDE SERPL-SCNC: 104 MMOL/L (ref 98–107)
CREAT SERPL-MCNC: 0.7 MG/DL (ref 0.51–0.95)
DEPRECATED HCO3 PLAS-SCNC: 26 MMOL/L (ref 22–29)
GFR SERPL CREATININE-BSD FRML MDRD: 87 ML/MIN/1.73M2
GLUCOSE SERPL-MCNC: 109 MG/DL (ref 70–99)
POTASSIUM SERPL-SCNC: 4.1 MMOL/L (ref 3.4–5.3)
SODIUM SERPL-SCNC: 140 MMOL/L (ref 136–145)

## 2022-11-07 PROCEDURE — 36415 COLL VENOUS BLD VENIPUNCTURE: CPT | Performed by: FAMILY MEDICINE

## 2022-11-07 PROCEDURE — 99214 OFFICE O/P EST MOD 30 MIN: CPT | Mod: 25 | Performed by: FAMILY MEDICINE

## 2022-11-07 PROCEDURE — 0124A COVID-19,PF,PFIZER BOOSTER BIVALENT: CPT | Performed by: FAMILY MEDICINE

## 2022-11-07 PROCEDURE — G0009 ADMIN PNEUMOCOCCAL VACCINE: HCPCS | Performed by: FAMILY MEDICINE

## 2022-11-07 PROCEDURE — 91312 COVID-19,PF,PFIZER BOOSTER BIVALENT: CPT | Performed by: FAMILY MEDICINE

## 2022-11-07 PROCEDURE — 90677 PCV20 VACCINE IM: CPT | Performed by: FAMILY MEDICINE

## 2022-11-07 PROCEDURE — 80048 BASIC METABOLIC PNL TOTAL CA: CPT | Performed by: FAMILY MEDICINE

## 2022-11-07 RX ORDER — LISINOPRIL 20 MG/1
20 TABLET ORAL DAILY
Qty: 90 TABLET | Refills: 3 | Status: SHIPPED | OUTPATIENT
Start: 2022-11-07 | End: 2023-11-01

## 2022-11-07 NOTE — PROGRESS NOTES
"  Assessment & Plan     ICD-10-CM    1. Essential hypertension  I10 Basic metabolic panel  (Ca, Cl, CO2, Creat, Gluc, K, Na, BUN)     lisinopril (ZESTRIL) 20 MG tablet     Basic metabolic panel  (Ca, Cl, CO2, Creat, Gluc, K, Na, BUN)      2. Hypercalcemia  E83.52       3. Acquired hypothyroidism  E03.9         Medication making: Patient presents today for her hypertension.  Her HCTZ was discontinued due to noted hypercalcemia and further evaluation with endocrinologist.  Patient had significant symptoms after discontinuing including a headache for a couple of weeks and blood pressure that remain high and she has been taking lisinopril 2 tablets daily at 20 mg.  She brings her log which shows blood pressure mostly in 140s/80s millimeters some lower.  Today initially, she wanted to discontinue lisinopril and get back on HCTZ.  Blood pressure was elevated as patient was slightly frustrated initially.  After having a conversation and the reason to switch medication, repeat blood pressure was normal.  Patient was reassured and happy.  We discussed that patient can continue lisinopril 20 mg daily and a new prescription will be sent.  We will recheck her electrolytes today to see if her calcium levels have also benefited.  Further we can make a decision if she would like to consider staying on lisinopril versus starting amlodipine 5 mg.  Patient is going for a wedding in [place] and would like to come back for a repeat check in a month's time.  This is reasonable.  Will check TSH at next visit.  It was borderline elevated last visit.    Total time spent including chart review, specialist note, patient interview, exam and documentation was 30 minutes  BMI:   Estimated body mass index is 30.12 kg/m  as calculated from the following:    Height as of this encounter: 1.683 m (5' 6.25\").    Weight as of this encounter: 85.3 kg (188 lb).       MEDICATIONS:  Continue current medications without change  See Patient " "Instructions    Return in about 6 weeks (around 12/19/2022) for Follow up.    Kimmie Cain MD  [facility]    Arianne Méndez is a 77 year old accompanied by her N/A, presenting for the following health issues:  Hypertension (Pt is having a horrible time with the new blood pressure medication, horrible headache, just overall not feeling good, dizziness. Wants to talk about getting back on her old medication. )      History of Present Illness       Reason for visit:  Blood pressure RX    She eats 4 or more servings of fruits and vegetables daily.She consumes 0 sweetened beverage(s) daily.She exercises with enough effort to increase her heart rate 20 to 29 minutes per day.  She exercises with enough effort to increase her heart rate 4 days per week.   She is taking medications regularly.     Patient Active Problem List   Diagnosis     Hypercalcemia     Acute blood loss anemia     Colon polyps     Essential hypertension     History of left knee replacement     Hypothyroidism     Knee osteoarthritis     Benign neoplasm of sigmoid colon     Diverticular disease of large intestine     History of total right knee replacement     Current Outpatient Medications   Medication     co-enzyme Q-10 100 MG CAPS capsule     hydrocortisone 2.5 % cream     levothyroxine (SYNTHROID/LEVOTHROID) 50 MCG tablet     lisinopril (ZESTRIL) 20 MG tablet     niacin (SLO-NIACIN) 500 MG CR tablet     Omega-3 1000 MG capsule     pravastatin (PRAVACHOL) 20 MG tablet     triamcinolone (KENALOG) 0.1 % external ointment     vitamin (B COMPLEX) capsule     No current facility-administered medications for this visit.       Review of Systems   Constitutional, HEENT, cardiovascular, pulmonary, gi and gu systems are negative, except as otherwise noted.      Objective    /64   Pulse 83   Ht 1.683 m (5' 6.25\")   Wt 85.3 kg (188 lb)   SpO2 100%   BMI 30.12 kg/m    Body mass index is 30.12 kg/m .  Physical Exam "   GENERAL: Healthy, alert and no distress  EYES: Eyes grossly normal to inspection.  No discharge or erythema, or obvious scleral/conjunctival abnormalities.  RESP: No audible wheeze, cough, or visible cyanosis.  No visible retractions or increased work of breathing.    SKIN: Visible skin clear. No significant rash, abnormal pigmentation or lesions.  NEURO: Cranial nerves grossly intact.  Mentation and speech appropriate for age.  PSYCH: Mentation appears normal, affect normal/bright, judgement and insight intact, normal speech and appearance well-groomed.      Lab on 09/23/2022   Component Date Value Ref Range Status     Vitamin D, Total (25-Hydroxy) 09/23/2022 55  20 - 75 ug/L Final     Phosphorus 09/23/2022 2.8  2.5 - 4.5 mg/dL Final     Parathyroid Hormone Intact 09/23/2022 72 (H)  15 - 65 pg/mL Final     Sodium 09/23/2022 140  136 - 145 mmol/L Final     Potassium 09/23/2022 4.9  3.4 - 5.3 mmol/L Final    Specimen slightly hemolyzed, potassium may be falsely elevated.  Specimen slightly hemolyzed, potassium may be falsely elevated.     Chloride 09/23/2022 103  98 - 107 mmol/L Final     Carbon Dioxide (CO2) 09/23/2022 29  22 - 29 mmol/L Final     Anion Gap 09/23/2022 8  7 - 15 mmol/L Final     Urea Nitrogen 09/23/2022 19.3  8.0 - 23.0 mg/dL Final     Creatinine 09/23/2022 0.55  0.51 - 0.95 mg/dL Final     Calcium 09/23/2022 10.6 (H)  8.8 - 10.2 mg/dL Final     Glucose 09/23/2022 103 (H)  70 - 99 mg/dL Final     Alkaline Phosphatase 09/23/2022 60  35 - 104 U/L Final     AST 09/23/2022    Final    Unsatisfactory specimen - hemolyzed    Specimen is hemolyzed which can falsely elevate AST. Analysis of a non-hemolyzed specimen may result in a lower value.     ALT 09/23/2022 17  10 - 35 U/L Final     Protein Total 09/23/2022 7.5  6.4 - 8.3 g/dL Final     Albumin 09/23/2022 4.3  3.5 - 5.2 g/dL Final     Bilirubin Total 09/23/2022 0.6  <=1.2 mg/dL Final     GFR Estimate 09/23/2022 >90  >60 mL/min/1.73m2 Final     Effective December 21, 2021 eGFRcr in adults is calculated using the 2021 CKD-EPI creatinine equation which includes age and gender (Senait et al., NEJM, DOI: 10.1056/VNATqu8202191)     TSH 09/23/2022 4.36 (H)  0.30 - 4.20 uIU/mL Final     Free T4 09/23/2022 1.46  0.90 - 1.70 ng/dL Final

## 2022-12-19 ENCOUNTER — OFFICE VISIT (OUTPATIENT)
Dept: FAMILY MEDICINE | Facility: CLINIC | Age: 77
End: 2022-12-19
Payer: COMMERCIAL

## 2022-12-19 VITALS
BODY MASS INDEX: 29.09 KG/M2 | DIASTOLIC BLOOD PRESSURE: 62 MMHG | RESPIRATION RATE: 16 BRPM | OXYGEN SATURATION: 98 % | HEART RATE: 81 BPM | WEIGHT: 181 LBS | SYSTOLIC BLOOD PRESSURE: 106 MMHG | HEIGHT: 66 IN

## 2022-12-19 DIAGNOSIS — I10 ESSENTIAL HYPERTENSION: Primary | ICD-10-CM

## 2022-12-19 DIAGNOSIS — R73.9 ELEVATED BLOOD SUGAR: ICD-10-CM

## 2022-12-19 DIAGNOSIS — R51.9 INTERMITTENT HEADACHE: ICD-10-CM

## 2022-12-19 PROCEDURE — 99214 OFFICE O/P EST MOD 30 MIN: CPT | Performed by: FAMILY MEDICINE

## 2022-12-19 NOTE — PROGRESS NOTES
"  Assessment & Plan     ICD-10-CM    1. Essential hypertension  I10       2. Elevated blood sugar  R73.9 Hemoglobin A1c      3. Intermittent headache  R51.9         Medical decision making: Patient is here today for follow-up of hypertension.  She has had elevated reading at home and has been taking 20 mg of lisinopril.  She wants her blood pressure and blood sugars to be in optimal control.  She has been dieting and has lost 8 pounds and further plans to lose 10 pounds more to be in ideal weight range.  She has been complaining about some intermittent head pressure and pain.  Today we talked about possible hypoglycemia versus hypotension and she should be checking her blood sugar and blood pressure during this episode.  Blood pressure in the clinic is low and I have asked her to stay well-hydrated and go back on lisinopril 10 mg if blood pressure is low.  We also discussed imaging if she has any persistent headache.  Patient will let me know.       BMI:   Estimated body mass index is 28.99 kg/m  as calculated from the following:    Height as of this encounter: 1.683 m (5' 6.25\").    Weight as of this encounter: 82.1 kg (181 lb).       MEDICATIONS:  Continue current medications without change    Return in about 4 months (around 4/19/2023).    Kimmie Cain MD  [facility]    Arianne Méndez is a 77 year old accompanied by her N/A, presenting for the following health issues:  RECHECK (Follow up on blood pressure and follow up from last visit. Pt is thinking she might need to go up a little bit more on her blood pressure medication. Pt wants a glucose reading today as well. )      History of Present Illness       Diabetes:   She presents for follow up of diabetes.  She is not checking blood glucose. She has no concerns regarding her diabetes at this time.  She is not experiencing numbness or burning in feet, excessive thirst, blurry vision, weight changes or redness, sores or blisters " "on feet.         Hypertension: She presents for follow up of hypertension.  She does check blood pressure  regularly outside of the clinic. Outside blood pressures have been over 140/90. She follows a low salt diet.       Patient Active Problem List   Diagnosis     Hypercalcemia     Acute blood loss anemia     Colon polyps     Essential hypertension     History of left knee replacement     Hypothyroidism     Knee osteoarthritis     Benign neoplasm of sigmoid colon     Diverticular disease of large intestine     History of total right knee replacement     Current Outpatient Medications   Medication     co-enzyme Q-10 100 MG CAPS capsule     hydrocortisone 2.5 % cream     levothyroxine (SYNTHROID/LEVOTHROID) 50 MCG tablet     lisinopril (ZESTRIL) 20 MG tablet     niacin (SLO-NIACIN) 500 MG CR tablet     Omega-3 1000 MG capsule     pravastatin (PRAVACHOL) 20 MG tablet     triamcinolone (KENALOG) 0.1 % external ointment     vitamin (B COMPLEX) capsule     No current facility-administered medications for this visit.       Review of Systems   Constitutional, HEENT, cardiovascular, pulmonary, gi and gu systems are negative, except as otherwise noted.      Objective    /62   Pulse 81   Resp 16   Ht 1.683 m (5' 6.25\")   Wt 82.1 kg (181 lb)   SpO2 98%   BMI 28.99 kg/m    Body mass index is 28.99 kg/m .  Physical Exam   GENERAL: healthy, alert and no distress  PSYCH: mentation appears normal, affect normal/bright    Office Visit on 11/07/2022   Component Date Value Ref Range Status     Sodium 11/07/2022 140  136 - 145 mmol/L Final     Potassium 11/07/2022 4.1  3.4 - 5.3 mmol/L Final     Chloride 11/07/2022 104  98 - 107 mmol/L Final     Carbon Dioxide (CO2) 11/07/2022 26  22 - 29 mmol/L Final     Anion Gap 11/07/2022 10  7 - 15 mmol/L Final     Urea Nitrogen 11/07/2022 18.8  8.0 - 23.0 mg/dL Final     Creatinine 11/07/2022 0.70  0.51 - 0.95 mg/dL Final     Calcium 11/07/2022 10.1  8.8 - 10.2 mg/dL Final     Glucose " 11/07/2022 109 (H)  70 - 99 mg/dL Final     GFR Estimate 11/07/2022 87  >60 mL/min/1.73m2 Final    Effective December 21, 2021 eGFRcr in adults is calculated using the 2021 CKD-EPI creatinine equation which includes age and gender (Senait et al., NEJM, DOI: 10.1056/TKJBwe1408353)

## 2022-12-30 ENCOUNTER — LAB (OUTPATIENT)
Dept: LAB | Facility: CLINIC | Age: 77
End: 2022-12-30
Payer: COMMERCIAL

## 2022-12-30 DIAGNOSIS — E83.52 HYPERCALCEMIA: ICD-10-CM

## 2022-12-30 DIAGNOSIS — E03.9 HYPOTHYROIDISM, UNSPECIFIED TYPE: ICD-10-CM

## 2022-12-30 DIAGNOSIS — R73.9 ELEVATED BLOOD SUGAR: ICD-10-CM

## 2022-12-30 LAB
ALBUMIN SERPL BCG-MCNC: 4 G/DL (ref 3.5–5.2)
ANION GAP SERPL CALCULATED.3IONS-SCNC: 14 MMOL/L (ref 7–15)
BUN SERPL-MCNC: 13.1 MG/DL (ref 8–23)
CALCIUM SERPL-MCNC: 10.4 MG/DL (ref 8.8–10.2)
CALCIUM, IONIZED MEASURED: 1.32 MMOL/L (ref 1.11–1.3)
CHLORIDE SERPL-SCNC: 105 MMOL/L (ref 98–107)
CREAT SERPL-MCNC: 0.65 MG/DL (ref 0.51–0.95)
DEPRECATED HCO3 PLAS-SCNC: 23 MMOL/L (ref 22–29)
GFR SERPL CREATININE-BSD FRML MDRD: 89 ML/MIN/1.73M2
GLUCOSE SERPL-MCNC: 85 MG/DL (ref 70–99)
HBA1C MFR BLD: 5.2 % (ref 0–5.6)
ION CA PH 7.4: 1.28 MMOL/L (ref 1.11–1.3)
PH: 7.34 (ref 7.35–7.45)
PHOSPHATE SERPL-MCNC: 3 MG/DL (ref 2.5–4.5)
POTASSIUM SERPL-SCNC: 4.4 MMOL/L (ref 3.4–5.3)
PTH-INTACT SERPL-MCNC: 53 PG/ML (ref 15–65)
SODIUM SERPL-SCNC: 142 MMOL/L (ref 136–145)
T4 FREE SERPL-MCNC: 1.38 NG/DL (ref 0.9–1.7)
TSH SERPL DL<=0.005 MIU/L-ACNC: 5.13 UIU/ML (ref 0.3–4.2)

## 2022-12-30 PROCEDURE — 83036 HEMOGLOBIN GLYCOSYLATED A1C: CPT

## 2022-12-30 PROCEDURE — 84443 ASSAY THYROID STIM HORMONE: CPT

## 2022-12-30 PROCEDURE — 82330 ASSAY OF CALCIUM: CPT

## 2022-12-30 PROCEDURE — 36415 COLL VENOUS BLD VENIPUNCTURE: CPT

## 2022-12-30 PROCEDURE — 80069 RENAL FUNCTION PANEL: CPT

## 2022-12-30 PROCEDURE — 83970 ASSAY OF PARATHORMONE: CPT

## 2022-12-30 PROCEDURE — 84439 ASSAY OF FREE THYROXINE: CPT

## 2023-01-04 ENCOUNTER — VIRTUAL VISIT (OUTPATIENT)
Dept: ENDOCRINOLOGY | Facility: CLINIC | Age: 78
End: 2023-01-04
Payer: COMMERCIAL

## 2023-01-04 DIAGNOSIS — E83.52 HYPERCALCEMIA: Primary | ICD-10-CM

## 2023-01-04 DIAGNOSIS — M85.80 OSTEOPENIA, UNSPECIFIED LOCATION: ICD-10-CM

## 2023-01-04 DIAGNOSIS — E03.9 HYPOTHYROIDISM, UNSPECIFIED TYPE: ICD-10-CM

## 2023-01-04 DIAGNOSIS — E21.0 PRIMARY HYPERPARATHYROIDISM (H): ICD-10-CM

## 2023-01-04 PROCEDURE — 99215 OFFICE O/P EST HI 40 MIN: CPT | Mod: 95 | Performed by: INTERNAL MEDICINE

## 2023-01-04 RX ORDER — LEVOTHYROXINE SODIUM 75 UG/1
75 TABLET ORAL DAILY
Qty: 90 TABLET | Refills: 1 | Status: SHIPPED | OUTPATIENT
Start: 2023-01-04 | End: 2023-04-03

## 2023-01-04 NOTE — PATIENT INSTRUCTIONS
-Please perform 24 urine studies in the coming 1 to 2 months to measure urine calcium excretion  -Increase levothyroxine to 75 mcg daily  -Lab draw in April 2023, when having additional labs drawn for primary care visit  -DXA scan of forearm as scheduled in May, 2023  -Follow-up with me at the end of May, 2023 to review results and discuss next steps with regards to treatment  -We will communicate results via [facility], or if needed by phone

## 2023-01-04 NOTE — PROGRESS NOTES
ENDOCRINOLOGY VIDEO VISIT FOLLOW-UP    HISTORY OF PRESENT ILLNESS    Kat Diego is a 77 year old female is being evaluated via a billable video visit for the following health issues.  Although this was a video visit, we could not access video images and visit was carried out primarily using audio through [employer].    1.  Primary hyperparathyroidism.  Had hydrochlorothiazide replaced with alternate antihypertensive with the guidance of PCP: This change was made in 10/2022.    Continues on vitamin D3 1000 IU daily.    Has a forearm DXA scan scheduled on 5/17/2023: Had an emergency, but was not able to have study completed sooner.    She looked into her sister's history of hypercalcemia: Her sister has known hypercalcemia but Ms. Diego is not certain of the cause.  Her sister also has hypothyroidism.    2.  Osteopenia.  No falls or fractures since last visit.    She is not eating as much yogurt every morning, otherwise diet is overall unchanged.  Has about 3 servings of either cheese, milk or almond milk each day.  Also has dark leafy greens every day.    Still exercising regularly 3-4 times per week.  Has lost 8 pounds since last visit with exercise and dietary changes.    3.  Hypothyroidism.  We increase levothyroxine to 50 mcg daily around 10/4/2022.  Ms. Diego confirms this dose change.  Takes levothyroxine first thing in the morning with water and waits 1 hour before having breakfast.    Pertinent endocrine and related history:  1. Hypercalcemia.  Noted as far back as 2016. Also later found to have elevated PTH.   -On hydrochlorothiazide for hypertension.   -No history of nephrolithiasis.  -Family history notable with sister with hypercalcemia.   2. Low bone density/osteopenia.  -No fracture history.  -Last DXA scan in 09/2022.  L2-L4 T score 1.1.  Discrepant BMD at L1.  Nonetheless, and interpretation L1-L4 was compared to prior study and showed 1.2% decline in BMD compared to 6/7/2019.  Left femoral  neck T score -1.2, left total hip T score -0.6.  1.2% increase in BMD compared to 6/7/2019.  Right femoral neck T score -0.8, right total hip T score -0.7.  0.1% increase in BMD compared to 6/7/2019.  3. Hypothyroidism.    Pertinent Social History: Patient is . She has one son and step children with whom she is close with. 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 Patient has an active social life and enjoys her [employer] fitness class, golfing, having lunch with friends, and watching movies.     PAST MEDICAL HISTORY  Past Medical History:   Diagnosis Date     Allergic rhinitis      Atopic dermatitis      Disease of thyroid gland     hypo     Diverticul disease small and large intestine, no perforati or abscess      Rosacea        MEDICATIONS  Current Outpatient Medications   Medication Sig Dispense Refill     co-enzyme Q-10 100 MG CAPS capsule Take 100 mg by mouth       levothyroxine (SYNTHROID/LEVOTHROID) 50 MCG tablet Take 1 tablet (50 mcg) by mouth daily 90 tablet 1     lisinopril (ZESTRIL) 20 MG tablet Take 1 tablet (20 mg) by mouth daily 90 tablet 3     niacin (SLO-NIACIN) 500 MG CR tablet Take 500 mg by mouth       Omega-3 1000 MG capsule Take 2 g by mouth       pravastatin (PRAVACHOL) 20 MG tablet Take 1 tablet (20 mg) by mouth daily 90 tablet 3     triamcinolone (KENALOG) 0.1 % external ointment Apply topically 2 times daily 30 g 1     vitamin (B COMPLEX) capsule Take 1 capsule by mouth every other day          Allergies, family, and social history were reviewed and documented as needed in EHR.     REVIEW OF SYSTEMS  A focused ROS was performed, with pertinent positives and negatives as noted in the HPI.    PHYSICAL EXAM  There were no vitals taken for this visit.  There is no height or weight on file to calculate BMI.  Neurological: Alert and oriented times 3.      DATA REVIEW  Each of the following laboratory and/or imaging studies were reviewed.    Lab on 12/30/2022   Component Date Value  Ref Range Status     TSH 12/30/2022 5.13 (H)  0.30 - 4.20 uIU/mL Final     Albumin 12/30/2022 4.0  3.5 - 5.2 g/dL Final     Sodium 12/30/2022 142  136 - 145 mmol/L Final     Potassium 12/30/2022 4.4  3.4 - 5.3 mmol/L Final     Chloride 12/30/2022 105  98 - 107 mmol/L Final     Carbon Dioxide (CO2) 12/30/2022 23  22 - 29 mmol/L Final     Anion Gap 12/30/2022 14  7 - 15 mmol/L Final     Urea Nitrogen 12/30/2022 13.1  8.0 - 23.0 mg/dL Final     Creatinine 12/30/2022 0.65  0.51 - 0.95 mg/dL Final     Calcium 12/30/2022 10.4 (H)  8.8 - 10.2 mg/dL Final     Glucose 12/30/2022 85  70 - 99 mg/dL Final     GFR Estimate 12/30/2022 89  >60 mL/min/1.73m2 Final    Effective December 21, 2021 eGFRcr in adults is calculated using the 2021 CKD-EPI creatinine equation which includes age and gender (Senait mendez al., [place], DOI: 10.1056/NWEAwd4688300)     Phosphorus 12/30/2022 3.0  2.5 - 4.5 mg/dL Final     Hemoglobin A1C 12/30/2022 5.2  0.0 - 5.6 % Final    Normal <5.7%   Prediabetes 5.7-6.4%    Diabetes 6.5% or higher     Note: Adopted from ADA consensus guidelines.     Parathyroid Hormone Intact 12/30/2022 53  15 - 65 pg/mL Final     Calcium, Ionized pH 7.4 12/30/2022 1.28  1.11 - 1.30 mmol/L Final     pH 12/30/2022 7.34 (L)  7.35 - 7.45 Final     Calcium, Ionized Measured 12/30/2022 1.32 (H)  1.11 - 1.30 mmol/L Final     Free T4 12/30/2022 1.38  0.90 - 1.70 ng/dL Final       Component      Latest Ref Rng & Units 9/23/2022   Sodium      136 - 145 mmol/L 140   Potassium      3.4 - 5.3 mmol/L 4.9   Chloride      98 - 107 mmol/L 103   Carbon Dioxide (CO2)      22 - 29 mmol/L 29   Anion Gap      7 - 15 mmol/L 8   Urea Nitrogen      8.0 - 23.0 mg/dL 19.3   Creatinine      0.51 - 0.95 mg/dL 0.55   Calcium      8.8 - 10.2 mg/dL 10.6 (H)   Glucose      70 - 99 mg/dL 103 (H)   Alkaline Phosphatase      35 - 104 U/L 60   AST          ALT      10 - 35 U/L 17   Protein Total      6.4 - 8.3 g/dL 7.5   Albumin      3.5 - 5.2 g/dL 4.3   Bilirubin  Total      <=1.2 mg/dL 0.6   GFR Estimate      >60 mL/min/1.73m2 >90   Vitamin D Deficiency screening      20 - 75 ug/L 55   Phosphorus      2.5 - 4.5 mg/dL 2.8   Parathyroid Hormone Intact      15 - 65 pg/mL 72 (H)   TSH      0.30 - 4.20 uIU/mL 4.36 (H)   T4 Free      0.90 - 1.70 ng/dL 1.46       EXAM: XR LUMBAR SPINE 2/3 VIEWS, XR THORACIC SPINE 2 VIEWS  LOCATION: [facility]  DATE/TIME: 9/23/2022 10:47 AM     INDICATION: Height loss with primary hyperparathyroidism, please evaluate for compression fracture  COMPARISON: None.  TECHNIQUE: CR Thoracic and Lumbar Spine.                                                                      IMPRESSION: Scoliosis of the thoracolumbar spine with the major thoracolumbar levocurvature measuring 17 degrees at the thoracolumbar junction. Vertebral body heights maintained. No spondylolisthesis. Mild multilevel spondylosis for age. Advanced lower   lumbar facet arthrosis.    ASSESSMENT  1.  Primary hyperparathyroidism.  Persistent hypercalcemia, albeit mild, off hydrochlorothiazide with concurrent inappropriately normal PTH.  Pattern is most consistent with primary hyperparathyroidism.  Would screen for FHH, especially given patient's sister's history of hypercalcemia.  If indeed results are consistent with primary hyperparathyroidism not due to FHH, we will await results of forearm DXA to guide our decision making with regards to treatment.    2. Osteopenia/low bone density.  No occult fracture on spine x-rays.  I recalculated fracture risk using FRAX calculator today: My initial calculation was erroneous and her 10-year fracture risk is actually 20% for all osteoporotic fractures, 9.9% for hip fracture (calculation includes mother's history of hip fracture).  This would make her a candidate for pharmacologic therapy.  However, if we discover she indeed has primary hyperparathyroidism would also consider surgical intervention for bone health; for this  reason, we will defer pharmacologic therapy for the short-term until our work-up is complete.  Based on that data, we can determine whether to consider parathyroidectomy and/or pharmacologic therapy.     3. Hypothyroidism.  We adjusted levothyroxine dose upward after last visit, however TSH remains mildly elevated on previsit labs.  We will increase levothyroxine dose.    PLAN  -Please perform 24 urine studies in the coming 1 to 2 months to measure urine calcium excretion  -Increase levothyroxine to 75 mcg daily  -Lab draw in April 2023, when having additional labs drawn for primary care visit  -DXA scan of forearm as scheduled in May, 2023  -Follow-up with me at the end of May, 2023 to review results and discuss next steps with regards to treatment  -We will communicate results via [employer], or if needed by phone      Orders Placed This Encounter   Procedures     Comprehensive metabolic panel     Phosphorus     Parathyroid Hormone Intact     Vitamin D Deficiency     TSH with free T4 reflex     Calcium timed urine     Creatinine timed urine     Sodium timed urine         Video-Visit Details    Type of service:  Video Visit      Physician location: On site    Video Start Time: 10:05 AM  Video End Time: 10:38 AM    I spent a total of 64 minutes on the date of encounter reviewing medical records, evaluating the patient, coordinating care and documenting in the EHR, as detailed above.      Platform used for Video Visit: [employer]--note, audio was used primarily for this visit due to video issues.      Jorge Snow MD   Division of Diabetes, Endocrinology and Metabolism  Department of Medicine

## 2023-01-04 NOTE — LETTER
1/4/2023         RE: Kat Diego  [address]  [place] [address]        Dear Colleague,    Thank you for referring your patient, Kat Diego, to the [facility]. Please see a copy of my visit note below.      ENDOCRINOLOGY VIDEO VISIT FOLLOW-UP    HISTORY OF PRESENT ILLNESS    Kat Diego is a 77 year old female is being evaluated via a billable video visit for the following health issues.  Although this was a video visit, we could not access video images and visit was carried out primarily using audio through [employer].    1.  Primary hyperparathyroidism.  Had hydrochlorothiazide replaced with alternate antihypertensive with the guidance of PCP: This change was made in 10/2022.    Continues on vitamin D3 1000 IU daily.    Has a forearm DXA scan scheduled on 5/17/2023: Had an emergency, but was not able to have study completed sooner.    She looked into her sister's history of hypercalcemia: Her sister has known hypercalcemia but Ms. Diego is not certain of the cause.  Her sister also has hypothyroidism.    2.  Osteopenia.  No falls or fractures since last visit.    She is not eating as much yogurt every morning, otherwise diet is overall unchanged.  Has about 3 servings of either cheese, milk or almond milk each day.  Also has dark leafy greens every day.    Still exercising regularly 3-4 times per week.  Has lost 8 pounds since last visit with exercise and dietary changes.    3.  Hypothyroidism.  We increase levothyroxine to 50 mcg daily around 10/4/2022.  Ms. Diego confirms this dose change.  Takes levothyroxine first thing in the morning with water and waits 1 hour before having breakfast.    Pertinent endocrine and related history:  1. Hypercalcemia.  Noted as far back as 2016. Also later found to have elevated PTH.   -On hydrochlorothiazide for hypertension.   -No history of nephrolithiasis.  -Family history notable with sister with hypercalcemia.   2.  Low bone density/osteopenia.  -No fracture history.  -Last DXA scan in 09/2022.  L2-L4 T score 1.1.  Discrepant BMD at L1.  Nonetheless, and interpretation L1-L4 was compared to prior study and showed 1.2% decline in BMD compared to 6/7/2019.  Left femoral neck T score -1.2, left total hip T score -0.6.  1.2% increase in BMD compared to 6/7/2019.  Right femoral neck T score -0.8, right total hip T score -0.7.  0.1% increase in BMD compared to 6/7/2019.  3. Hypothyroidism.    Pertinent Social History: Patient is . She has one son and step children with whom she is close with. 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 Patient has an active social life and enjoys her [employer] fitness class, golfing, having lunch with friends, and watching movies.     PAST MEDICAL HISTORY  Past Medical History:   Diagnosis Date     Allergic rhinitis      Atopic dermatitis      Disease of thyroid gland     hypo     Diverticul disease small and large intestine, no perforati or abscess      Rosacea        MEDICATIONS  Current Outpatient Medications   Medication Sig Dispense Refill     co-enzyme Q-10 100 MG CAPS capsule Take 100 mg by mouth       levothyroxine (SYNTHROID/LEVOTHROID) 50 MCG tablet Take 1 tablet (50 mcg) by mouth daily 90 tablet 1     lisinopril (ZESTRIL) 20 MG tablet Take 1 tablet (20 mg) by mouth daily 90 tablet 3     niacin (SLO-NIACIN) 500 MG CR tablet Take 500 mg by mouth       Omega-3 1000 MG capsule Take 2 g by mouth       pravastatin (PRAVACHOL) 20 MG tablet Take 1 tablet (20 mg) by mouth daily 90 tablet 3     triamcinolone (KENALOG) 0.1 % external ointment Apply topically 2 times daily 30 g 1     vitamin (B COMPLEX) capsule Take 1 capsule by mouth every other day          Allergies, family, and social history were reviewed and documented as needed in EHR.     REVIEW OF SYSTEMS  A focused ROS was performed, with pertinent positives and negatives as noted in the HPI.    PHYSICAL EXAM  There were no  vitals taken for this visit.  There is no height or weight on file to calculate BMI.  Neurological: Alert and oriented times 3.      DATA REVIEW  Each of the following laboratory and/or imaging studies were reviewed.    Lab on 12/30/2022   Component Date Value Ref Range Status     TSH 12/30/2022 5.13 (H)  0.30 - 4.20 uIU/mL Final     Albumin 12/30/2022 4.0  3.5 - 5.2 g/dL Final     Sodium 12/30/2022 142  136 - 145 mmol/L Final     Potassium 12/30/2022 4.4  3.4 - 5.3 mmol/L Final     Chloride 12/30/2022 105  98 - 107 mmol/L Final     Carbon Dioxide (CO2) 12/30/2022 23  22 - 29 mmol/L Final     Anion Gap 12/30/2022 14  7 - 15 mmol/L Final     Urea Nitrogen 12/30/2022 13.1  8.0 - 23.0 mg/dL Final     Creatinine 12/30/2022 0.65  0.51 - 0.95 mg/dL Final     Calcium 12/30/2022 10.4 (H)  8.8 - 10.2 mg/dL Final     Glucose 12/30/2022 85  70 - 99 mg/dL Final     GFR Estimate 12/30/2022 89  >60 mL/min/1.73m2 Final    Effective December 21, 2021 eGFRcr in adults is calculated using the 2021 CKD-EPI creatinine equation which includes age and gender (Senait et al., [place], DOI: 10.1056/PLWYma7161469)     Phosphorus 12/30/2022 3.0  2.5 - 4.5 mg/dL Final     Hemoglobin A1C 12/30/2022 5.2  0.0 - 5.6 % Final    Normal <5.7%   Prediabetes 5.7-6.4%    Diabetes 6.5% or higher     Note: Adopted from ADA consensus guidelines.     Parathyroid Hormone Intact 12/30/2022 53  15 - 65 pg/mL Final     Calcium, Ionized pH 7.4 12/30/2022 1.28  1.11 - 1.30 mmol/L Final     pH 12/30/2022 7.34 (L)  7.35 - 7.45 Final     Calcium, Ionized Measured 12/30/2022 1.32 (H)  1.11 - 1.30 mmol/L Final     Free T4 12/30/2022 1.38  0.90 - 1.70 ng/dL Final       Component      Latest Ref Rng & Units 9/23/2022   Sodium      136 - 145 mmol/L 140   Potassium      3.4 - 5.3 mmol/L 4.9   Chloride      98 - 107 mmol/L 103   Carbon Dioxide (CO2)      22 - 29 mmol/L 29   Anion Gap      7 - 15 mmol/L 8   Urea Nitrogen      8.0 - 23.0 mg/dL 19.3   Creatinine      0.51 - 0.95  mg/dL 0.55   Calcium      8.8 - 10.2 mg/dL 10.6 (H)   Glucose      70 - 99 mg/dL 103 (H)   Alkaline Phosphatase      35 - 104 U/L 60   AST          ALT      10 - 35 U/L 17   Protein Total      6.4 - 8.3 g/dL 7.5   Albumin      3.5 - 5.2 g/dL 4.3   Bilirubin Total      <=1.2 mg/dL 0.6   GFR Estimate      >60 mL/min/1.73m2 >90   Vitamin D Deficiency screening      20 - 75 ug/L 55   Phosphorus      2.5 - 4.5 mg/dL 2.8   Parathyroid Hormone Intact      15 - 65 pg/mL 72 (H)   TSH      0.30 - 4.20 uIU/mL 4.36 (H)   T4 Free      0.90 - 1.70 ng/dL 1.46       EXAM: XR LUMBAR SPINE 2/3 VIEWS, XR THORACIC SPINE 2 VIEWS  LOCATION: [facility]  DATE/TIME: 9/23/2022 10:47 AM     INDICATION: Height loss with primary hyperparathyroidism, please evaluate for compression fracture  COMPARISON: None.  TECHNIQUE: CR Thoracic and Lumbar Spine.                                                                      IMPRESSION: Scoliosis of the thoracolumbar spine with the major thoracolumbar levocurvature measuring 17 degrees at the thoracolumbar junction. Vertebral body heights maintained. No spondylolisthesis. Mild multilevel spondylosis for age. Advanced lower   lumbar facet arthrosis.    ASSESSMENT  1.  Primary hyperparathyroidism.  Persistent hypercalcemia, albeit mild, off hydrochlorothiazide with concurrent inappropriately normal PTH.  Pattern is most consistent with primary hyperparathyroidism.  Would screen for FHH, especially given patient's sister's history of hypercalcemia.  If indeed results are consistent with primary hyperparathyroidism not due to FHH, we will await results of forearm DXA to guide our decision making with regards to treatment.    2. Osteopenia/low bone density.  No occult fracture on spine x-rays.  I recalculated fracture risk using FRAX calculator today: My initial calculation was erroneous and her 10-year fracture risk is actually 20% for all osteoporotic fractures, 9.9% for hip  fracture (calculation includes mother's history of hip fracture).  This would make her a candidate for pharmacologic therapy.  However, if we discover she indeed has primary hyperparathyroidism would also consider surgical intervention for bone health; for this reason, we will defer pharmacologic therapy for the short-term until our work-up is complete.  Based on that data, we can determine whether to consider parathyroidectomy and/or pharmacologic therapy.     3. Hypothyroidism.  We adjusted levothyroxine dose upward after last visit, however TSH remains mildly elevated on previsit labs.  We will increase levothyroxine dose.    PLAN  -Please perform 24 urine studies in the coming 1 to 2 months to measure urine calcium excretion  -Increase levothyroxine to 75 mcg daily  -Lab draw in April 2023, when having additional labs drawn for primary care visit  -DXA scan of forearm as scheduled in May, 2023  -Follow-up with me at the end of May, 2023 to review results and discuss next steps with regards to treatment  -We will communicate results via MyChart, or if needed by phone      Orders Placed This Encounter   Procedures     Comprehensive metabolic panel     Phosphorus     Parathyroid Hormone Intact     Vitamin D Deficiency     TSH with free T4 reflex     Calcium timed urine     Creatinine timed urine     Sodium timed urine         Video-Visit Details    Type of service:  Video Visit    Physician location: Offsite    Video Start Time: 10:05 AM  Video End Time: 10:38 AM    I spent a total of 64 minutes on the date of encounter reviewing medical records, evaluating the patient, coordinating care and documenting in the EHR, as detailed above.      Platform used for Video Visit: [employer]--note, audio was used primarily for this visit due to video issues.      Jorge Snow MD   Division of Diabetes, Endocrinology and Metabolism  Department of Medicine        Again, thank you for allowing me to participate in the care of  your patient.        Sincerely,        DEBI Snow MD

## 2023-01-31 NOTE — TELEPHONE ENCOUNTER
Please asked patient to schedule appointment for med check.  Refills have been given to her for now.  Kimmie Cain MD  1/24/2020         2 pair

## 2023-04-03 ENCOUNTER — OFFICE VISIT (OUTPATIENT)
Dept: FAMILY MEDICINE | Facility: CLINIC | Age: 78
End: 2023-04-03
Payer: COMMERCIAL

## 2023-04-03 VITALS
SYSTOLIC BLOOD PRESSURE: 129 MMHG | RESPIRATION RATE: 16 BRPM | WEIGHT: 181.6 LBS | OXYGEN SATURATION: 98 % | HEART RATE: 70 BPM | DIASTOLIC BLOOD PRESSURE: 66 MMHG | HEIGHT: 66 IN | BODY MASS INDEX: 29.18 KG/M2

## 2023-04-03 DIAGNOSIS — F41.9 ANXIETY: ICD-10-CM

## 2023-04-03 DIAGNOSIS — E78.2 MIXED HYPERLIPIDEMIA: ICD-10-CM

## 2023-04-03 DIAGNOSIS — E03.9 HYPOTHYROIDISM, UNSPECIFIED TYPE: ICD-10-CM

## 2023-04-03 DIAGNOSIS — G89.29 CHRONIC RIGHT SHOULDER PAIN: ICD-10-CM

## 2023-04-03 DIAGNOSIS — E83.52 HYPERCALCEMIA: ICD-10-CM

## 2023-04-03 DIAGNOSIS — E21.0 PRIMARY HYPERPARATHYROIDISM (H): ICD-10-CM

## 2023-04-03 DIAGNOSIS — M85.80 OSTEOPENIA, UNSPECIFIED LOCATION: ICD-10-CM

## 2023-04-03 DIAGNOSIS — I10 ESSENTIAL HYPERTENSION: ICD-10-CM

## 2023-04-03 DIAGNOSIS — Z00.00 ENCOUNTER FOR MEDICARE ANNUAL WELLNESS EXAM: Primary | ICD-10-CM

## 2023-04-03 DIAGNOSIS — M25.511 CHRONIC RIGHT SHOULDER PAIN: ICD-10-CM

## 2023-04-03 LAB
ALBUMIN SERPL BCG-MCNC: 4.2 G/DL (ref 3.5–5.2)
ALP SERPL-CCNC: 68 U/L (ref 35–104)
ALT SERPL W P-5'-P-CCNC: 18 U/L (ref 10–35)
ANION GAP SERPL CALCULATED.3IONS-SCNC: 13 MMOL/L (ref 7–15)
AST SERPL W P-5'-P-CCNC: 40 U/L (ref 10–35)
BILIRUB SERPL-MCNC: 0.6 MG/DL
BUN SERPL-MCNC: 15.9 MG/DL (ref 8–23)
CALCIUM SERPL-MCNC: 10.8 MG/DL (ref 8.8–10.2)
CHLORIDE SERPL-SCNC: 106 MMOL/L (ref 98–107)
CHOLEST SERPL-MCNC: 187 MG/DL
CREAT SERPL-MCNC: 0.7 MG/DL (ref 0.51–0.95)
DEPRECATED HCO3 PLAS-SCNC: 22 MMOL/L (ref 22–29)
GFR SERPL CREATININE-BSD FRML MDRD: 87 ML/MIN/1.73M2
GLUCOSE SERPL-MCNC: 100 MG/DL (ref 70–99)
HDLC SERPL-MCNC: 70 MG/DL
LDLC SERPL CALC-MCNC: 98 MG/DL
NONHDLC SERPL-MCNC: 117 MG/DL
POTASSIUM SERPL-SCNC: 5.4 MMOL/L (ref 3.4–5.3)
PROT SERPL-MCNC: 7.3 G/DL (ref 6.4–8.3)
SODIUM SERPL-SCNC: 141 MMOL/L (ref 136–145)
TRIGL SERPL-MCNC: 94 MG/DL
TSH SERPL DL<=0.005 MIU/L-ACNC: 2.43 UIU/ML (ref 0.3–4.2)

## 2023-04-03 PROCEDURE — 99214 OFFICE O/P EST MOD 30 MIN: CPT | Mod: 25 | Performed by: FAMILY MEDICINE

## 2023-04-03 PROCEDURE — 84443 ASSAY THYROID STIM HORMONE: CPT | Performed by: FAMILY MEDICINE

## 2023-04-03 PROCEDURE — 80053 COMPREHEN METABOLIC PANEL: CPT | Performed by: FAMILY MEDICINE

## 2023-04-03 PROCEDURE — 80061 LIPID PANEL: CPT | Performed by: FAMILY MEDICINE

## 2023-04-03 PROCEDURE — G0439 PPPS, SUBSEQ VISIT: HCPCS | Performed by: FAMILY MEDICINE

## 2023-04-03 PROCEDURE — 36415 COLL VENOUS BLD VENIPUNCTURE: CPT | Performed by: FAMILY MEDICINE

## 2023-04-03 RX ORDER — LEVOTHYROXINE SODIUM 75 UG/1
75 TABLET ORAL DAILY
Qty: 90 TABLET | Refills: 3 | Status: SHIPPED | OUTPATIENT
Start: 2023-04-03 | End: 2024-03-18

## 2023-04-03 RX ORDER — ESCITALOPRAM OXALATE 5 MG/1
5 TABLET ORAL DAILY
Qty: 30 TABLET | Refills: 1 | Status: SHIPPED | OUTPATIENT
Start: 2023-04-03 | End: 2023-07-24

## 2023-04-03 ASSESSMENT — ENCOUNTER SYMPTOMS
PALPITATIONS: 1
SHORTNESS OF BREATH: 0
MYALGIAS: 0
CONSTIPATION: 0
BREAST MASS: 0
HEMATOCHEZIA: 0
COUGH: 0
ARTHRALGIAS: 1
FEVER: 0
NAUSEA: 0
DYSURIA: 0
PARESTHESIAS: 0
DIARRHEA: 0
JOINT SWELLING: 0
HEMATURIA: 0
ABDOMINAL PAIN: 0
HEADACHES: 0
EYE PAIN: 0
HEARTBURN: 0
FREQUENCY: 0
DIZZINESS: 0
SORE THROAT: 0
WEAKNESS: 0
CHILLS: 0

## 2023-04-03 ASSESSMENT — ACTIVITIES OF DAILY LIVING (ADL): CURRENT_FUNCTION: NO ASSISTANCE NEEDED

## 2023-04-03 NOTE — PROGRESS NOTES
ASSESSMENT / PLAN:       ICD-10-CM    1. Encounter for Medicare annual wellness exam  Z00.00 Comprehensive metabolic panel (BMP + Alb, Alk Phos, ALT, AST, Total. Bili, TP)     PRIMARY CARE FOLLOW-UP SCHEDULING     Comprehensive metabolic panel (BMP + Alb, Alk Phos, ALT, AST, Total. Bili, TP)      2. Hypercalcemia  E83.52 levothyroxine (SYNTHROID/LEVOTHROID) 75 MCG tablet      3. Hypothyroidism, unspecified type  E03.9 levothyroxine (SYNTHROID/LEVOTHROID) 75 MCG tablet     TSH with free T4 reflex     TSH with free T4 reflex      4. Osteopenia, unspecified location  M85.80 levothyroxine (SYNTHROID/LEVOTHROID) 75 MCG tablet      5. Primary hyperparathyroidism (H)  E21.0 levothyroxine (SYNTHROID/LEVOTHROID) 75 MCG tablet      6. Essential hypertension  I10 Comprehensive metabolic panel (BMP + Alb, Alk Phos, ALT, AST, Total. Bili, TP)     Comprehensive metabolic panel (BMP + Alb, Alk Phos, ALT, AST, Total. Bili, TP)      7. Anxiety  F41.9 escitalopram (LEXAPRO) 5 MG tablet      8. Mixed hyperlipidemia  E78.2 Lipid panel     Lipid panel          Patient is here today for annual visit.  Following issues addressed    1: Hypertension: Blood pressure initially was extremely high.  Has an element of whitecoat hypertension.  Does relate to underlying anxiety and whitecoat syndrome.  Feels that she is type A personality and blood pressure can fluctuate.  I wanted to increase her medication or add amlodipine.  However, subsequent check of blood pressure shows blood pressure to be 129/66 mmHg and I do not think we can increase her blood pressure medication for labile hypertension.  We need to address her possible underlying anxiety  2: Anxiety: I will do a trial of escitalopram at a low-dose and follow-up in 2 weeks.  She will continue to keep a blood pressure check at home.  3: Primary hyperparathyroidism: Has followed with endocrinology in the past but would just like to follow at this clinic.  Discussed with patient that she  should have evaluation and DEXA scan as ordered by endocrinology and she will follow-up with me and she is agreeable to to the blood work and scans in the days.  4: Hyperlipidemia: Check lipid panel as above.  Continue medication.  5: Hypothyroidism: Her levothyroxine was increased by her endocrinologist and she does not like to increase her medication.  Discussed that her TSH was elevated and hence the reason to increase.  We will recheck TSH today.      COUNSELING:  Reviewed preventive health counseling, as reflected in patient instructions       Regular exercise       Healthy diet/nutrition       Vision screening       Fall risk prevention       Osteoporosis prevention/bone health        She reports that she has never smoked. She has never used smokeless tobacco.      Appropriate preventive services were discussed with this patient, including applicable screening as appropriate for cardiovascular disease, diabetes, osteopenia/osteoporosis, and glaucoma.  As appropriate for age/gender, discussed screening for colorectal cancer, prostate cancer, breast cancer, and cervical cancer. Checklist reviewing preventive services available has been given to the patient.    Reviewed patients plan of care and provided an AVS. The Basic Care Plan (routine screening as documented in Health Maintenance) for Kat meets the Care Plan requirement. This Care Plan has been established and reviewed with the Patient.  SUBJECTIVE:   Honey is a 77 year old who presents for Preventive Visit.      4/3/2023     9:15 AM   Additional Questions   Roomed by Meena Veronica CMA   Accompanied by N/A   Patient has been advised of split billing requirements and indicates understanding: Yes  Are you in the first 12 months of your Medicare coverage?  No    Healthy Habits:     In general, how would you rate your overall health?  Excellent    Frequency of exercise:  2-3 days/week    Duration of exercise:  15-30 minutes    Do you usually eat at least 4  "servings of fruit and vegetables a day, include whole grains    & fiber and avoid regularly eating high fat or \"junk\" foods?  Yes    Taking medications regularly:  Yes    Barriers to taking medications:  None    Medication side effects:  None    Ability to successfully perform activities of daily living:  No assistance needed    Home Safety:  No safety concerns identified    Hearing Impairment:  No hearing concerns    In the past 6 months, have you been bothered by leaking of urine?  No    In general, how would you rate your overall mental or emotional health?  Excellent      PHQ-2 Total Score: 0    Additional concerns today:  Yes      Have you ever done Advance Care Planning? (For example, a Health Directive, POLST, or a discussion with a medical provider or your loved ones about your wishes): Yes, advance care planning is on file.       Fall risk  Fallen 2 or more times in the past year?: No  Any fall with injury in the past year?: No  click delete button to remove this line now  Cognitive Screening   1) Repeat 3 items (Leader, Season, Table)      2) Clock draw:   NORMAL  3) 3 item recall:   Recalls 3 objects  Results: 3 items recalled: COGNITIVE IMPAIRMENT LESS LIKELY    Mini-CogTM Copyright KLAUS Rutherford. Licensed by the author for use in [facility]; reprinted with permission (soob@[facility]). All rights reserved.      Do you have sleep apnea, excessive snoring or daytime drowsiness?: no    Reviewed and updated as needed this visit by clinical staff   Tobacco  Allergies  Meds              Reviewed and updated as needed this visit by Provider                 Social History     Tobacco Use     Smoking status: Never     Smokeless tobacco: Never   Vaping Use     Vaping status: Not on file   Substance Use Topics     Alcohol use: Yes     Comment: Alcoholic Drinks/day: rarely             4/3/2023     8:58 AM   Alcohol Use   Prescreen: >3 drinks/day or >7 drinks/week? No          View : No data to display.    "           Do you have a current opioid prescription? No  Do you use any other controlled substances or medications that are not prescribed by a provider? None                Current providers sharing in care for this patient include:   Patient Care Team:  Kimmie Cain MD as PCP - General (Family Medicine)  Kimmie Cain MD as Referring Physician (Family Practice)  Kimmie Cain MD as Assigned PCP  Jorge Snow MD as MD (Endocrinology, Diabetes, and Metabolism)  Jorge Snow MD as Assigned Endocrinology Provider    The following health maintenance items are reviewed in Epic and correct as of today:  Health Maintenance   Topic Date Due     MEDICARE ANNUAL WELLNESS VISIT  04/04/2023     ANNUAL REVIEW OF HM ORDERS  10/04/2023     COLORECTAL CANCER SCREENING  10/15/2023     TSH W/FREE T4 REFLEX  12/30/2023     FALL RISK ASSESSMENT  04/03/2024     DTAP/TDAP/TD IMMUNIZATION (2 - Td or Tdap) 10/15/2025     LIPID  04/04/2027     ADVANCE CARE PLANNING  04/04/2027     DEXA  09/19/2037     HEPATITIS C SCREENING  Completed     PHQ-2 (once per calendar year)  Completed     INFLUENZA VACCINE  Completed     Pneumococcal Vaccine: 65+ Years  Completed     ZOSTER IMMUNIZATION  Completed     COVID-19 Vaccine  Completed     IPV IMMUNIZATION  Aged Out     MENINGITIS IMMUNIZATION  Aged Out     BP Readings from Last 3 Encounters:   04/03/23 129/66   12/19/22 106/62   11/07/22 122/64    Wt Readings from Last 3 Encounters:   04/03/23 82.4 kg (181 lb 9.6 oz)   12/19/22 82.1 kg (181 lb)   11/07/22 85.3 kg (188 lb)                  Patient Active Problem List   Diagnosis     Hypercalcemia     Acute blood loss anemia     Colon polyps     Essential hypertension     History of left knee replacement     Hypothyroidism     Knee osteoarthritis     Benign neoplasm of sigmoid colon     Diverticular disease of large intestine     History of total right knee replacement     Past Surgical History:   Procedure Laterality Date      DENTAL SURGERY       DILATION AND CURETTAGE       TOTAL KNEE ARTHROPLASTY Left 2015    [facility]; Rolf Helton MD     WISDOM TOOTH EXTRACTION       [facility] TOTAL KNEE ARTHROPLASTY Left 2015    Procedure: KNEE TOTAL ARTHROPLASTY, LEFT;  Surgeon: Rolf Helton MD;  Location: [facility];  Service: Orthopedics     [facility] TOTAL KNEE ARTHROPLASTY Right 2016    Procedure: RIGHT KNEE TOTAL ARTHROPLASTY;  Surgeon: Rolf Helton MD;  Location: [facility];  Service: Orthopedics       Social History     Tobacco Use     Smoking status: Never     Smokeless tobacco: Never   Vaping Use     Vaping status: Not on file   Substance Use Topics     Alcohol use: Yes     Comment: Alcoholic Drinks/day: rarely     Family History   Problem Relation Age of Onset     Heart Disease Mother      Cerebrovascular Disease Mother      Colon Cancer Father      Hyperlipidemia Sister      Hypertension Sister      Heart Disease Brother 60.00        Massive MI,  in sleep         Current Outpatient Medications   Medication Sig Dispense Refill     co-enzyme Q-10 100 MG CAPS capsule Take 100 mg by mouth       escitalopram (LEXAPRO) 5 MG tablet Take 1 tablet (5 mg) by mouth daily 30 tablet 1     levothyroxine (SYNTHROID/LEVOTHROID) 75 MCG tablet Take 1 tablet (75 mcg) by mouth daily 90 tablet 3     lisinopril (ZESTRIL) 20 MG tablet Take 1 tablet (20 mg) by mouth daily 90 tablet 3     niacin (SLO-NIACIN) 500 MG CR tablet Take 500 mg by mouth       Omega-3 1000 MG capsule Take 2 g by mouth       pravastatin (PRAVACHOL) 20 MG tablet Take 1 tablet (20 mg) by mouth daily 90 tablet 3     triamcinolone (KENALOG) 0.1 % external ointment Apply topically 2 times daily 30 g 1     vitamin (B COMPLEX) capsule Take 1 capsule by mouth every other day            Mammogram Screening - Patient over age 75, has elected to continue with screening.  Pertinent mammograms are reviewed under the imaging tab.    Review of Systems  "  Constitutional: Negative for chills and fever.   HENT: Negative for congestion, ear pain, hearing loss and sore throat.    Eyes: Negative for pain and visual disturbance.   Respiratory: Negative for cough and shortness of breath.    Cardiovascular: Positive for palpitations. Negative for chest pain and peripheral edema.   Gastrointestinal: Negative for abdominal pain, constipation, diarrhea, heartburn, hematochezia and nausea.   Breasts:  Negative for tenderness, breast mass and discharge.   Genitourinary: Negative for dysuria, frequency, genital sores, hematuria, pelvic pain, urgency, vaginal bleeding and vaginal discharge.   Musculoskeletal: Positive for arthralgias. Negative for joint swelling and myalgias.   Skin: Negative for rash.   Neurological: Negative for dizziness, weakness, headaches and paresthesias.   Psychiatric/Behavioral: Positive for mood changes.     Palpitations are NOT anymore ( during time of switch of discontinuing hydrochlorothiazide)  Arthralgia- On right shoulder- Uses Voltaren.  Follows with [facility] orthopedic.  Mood changes-feels whitecoat hypertension and anxiety that can vary.    OBJECTIVE:   BP (!) 191/89 (BP Location: Left arm, Patient Position: Sitting, Cuff Size: Adult Large)   Pulse 86   Resp 16   Ht 1.676 m (5' 6\")   Wt 82.4 kg (181 lb 9.6 oz)   SpO2 98%   BMI 29.31 kg/m   Estimated body mass index is 29.31 kg/m  as calculated from the following:    Height as of this encounter: 1.676 m (5' 6\").    Weight as of this encounter: 82.4 kg (181 lb 9.6 oz).  Physical Exam  GENERAL: healthy, alert and no distress  NECK: no adenopathy, no asymmetry, masses, or scars and thyroid normal to palpation  RESP: lungs clear to auscultation - no rales, rhonchi or wheezes  CV: regular rate and rhythm, normal S1 S2, no S3 or S4, no murmur, click or rub, no peripheral edema and peripheral pulses strong  ABDOMEN: soft, nontender, no hepatosplenomegaly, no masses and bowel sounds normal  MS: " no gross musculoskeletal defects noted, no edema    Diagnostic Test Results:  Labs reviewed in Epic  No results found for this or any previous visit (from the past 24 hour(s)).        Kimmie Cain MD  [facility]

## 2023-04-06 DIAGNOSIS — E87.5 SERUM POTASSIUM ELEVATED: ICD-10-CM

## 2023-04-06 DIAGNOSIS — E83.52 HYPERCALCEMIA: Primary | ICD-10-CM

## 2023-04-25 DIAGNOSIS — F41.9 ANXIETY: ICD-10-CM

## 2023-04-26 RX ORDER — ESCITALOPRAM OXALATE 5 MG/1
5 TABLET ORAL DAILY
Qty: 30 TABLET | Refills: 1 | OUTPATIENT
Start: 2023-04-26

## 2023-05-15 DIAGNOSIS — E78.2 MIXED HYPERLIPIDEMIA: ICD-10-CM

## 2023-05-16 RX ORDER — PRAVASTATIN SODIUM 20 MG
20 TABLET ORAL DAILY
Qty: 90 TABLET | Refills: 3 | Status: SHIPPED | OUTPATIENT
Start: 2023-05-16 | End: 2024-05-14

## 2023-05-16 NOTE — TELEPHONE ENCOUNTER
"Last Written Prescription Date:  4/4/22  Last Fill Quantity: 90,  # refills: 3   Last office visit provider:  4/3/23     Requested Prescriptions   Pending Prescriptions Disp Refills     pravastatin (PRAVACHOL) 20 MG tablet 90 tablet 3     Sig: Take 1 tablet (20 mg) by mouth daily       Statins Protocol Passed - 5/15/2023  6:06 PM        Passed - LDL on file in past 12 months     Recent Labs   Lab Test 04/03/23  1024   LDL 98             Passed - No abnormal creatine kinase in past 12 months     No lab results found.             Passed - Recent (12 mo) or future (30 days) visit within the authorizing provider's specialty     Patient has had an office visit with the authorizing provider or a provider within the authorizing providers department within the previous 12 mos or has a future within next 30 days. See \"Patient Info\" tab in inbasket, or \"Choose Columns\" in Meds & Orders section of the refill encounter.              Passed - Medication is active on med list        Passed - Patient is age 18 or older        Passed - No active pregnancy on record        Passed - No positive pregnancy test in past 12 months             Eli Chi RN 05/16/23 2:12 AM  "

## 2023-05-17 ENCOUNTER — HOSPITAL ENCOUNTER (OUTPATIENT)
Dept: BONE DENSITY | Facility: HOSPITAL | Age: 78
Discharge: HOME | End: 2023-05-17
Attending: INTERNAL MEDICINE | Admitting: INTERNAL MEDICINE
Payer: COMMERCIAL

## 2023-05-17 DIAGNOSIS — M85.80 OSTEOPENIA, UNSPECIFIED LOCATION: ICD-10-CM

## 2023-05-17 DIAGNOSIS — E83.52 HYPERCALCEMIA: ICD-10-CM

## 2023-05-17 DIAGNOSIS — E21.0 PRIMARY HYPERPARATHYROIDISM (H): ICD-10-CM

## 2023-05-17 PROCEDURE — 77081 DXA BONE DENSITY APPENDICULR: CPT

## 2023-05-21 DIAGNOSIS — E83.52 HYPERCALCEMIA: Primary | ICD-10-CM

## 2023-05-22 ENCOUNTER — LAB (OUTPATIENT)
Dept: LAB | Facility: CLINIC | Age: 78
End: 2023-05-22
Payer: COMMERCIAL

## 2023-05-22 DIAGNOSIS — E83.52 HYPERCALCEMIA: ICD-10-CM

## 2023-05-22 DIAGNOSIS — E87.5 SERUM POTASSIUM ELEVATED: ICD-10-CM

## 2023-05-22 PROCEDURE — 80048 BASIC METABOLIC PNL TOTAL CA: CPT

## 2023-05-22 PROCEDURE — 36415 COLL VENOUS BLD VENIPUNCTURE: CPT

## 2023-05-23 LAB
ANION GAP SERPL CALCULATED.3IONS-SCNC: 11 MMOL/L (ref 7–15)
BUN SERPL-MCNC: 18.5 MG/DL (ref 8–23)
CALCIUM SERPL-MCNC: 10.7 MG/DL (ref 8.8–10.2)
CHLORIDE SERPL-SCNC: 104 MMOL/L (ref 98–107)
CREAT SERPL-MCNC: 0.76 MG/DL (ref 0.51–0.95)
DEPRECATED HCO3 PLAS-SCNC: 28 MMOL/L (ref 22–29)
GFR SERPL CREATININE-BSD FRML MDRD: 79 ML/MIN/1.73M2
GLUCOSE SERPL-MCNC: 86 MG/DL (ref 70–99)
POTASSIUM SERPL-SCNC: 4.7 MMOL/L (ref 3.4–5.3)
SODIUM SERPL-SCNC: 143 MMOL/L (ref 136–145)

## 2023-05-24 LAB
CALCIUM 24H UR-MRATE: 0.27 G/SPEC (ref 0.1–0.3)
CALCIUM UR-MCNC: 30.5 MG/DL
COLLECT DURATION TIME UR: 24 H
COLLECT DURATION TIME UR: 24 H
CREAT 24H UR-MRATE: 0.74 G/(24.H) (ref 0.72–1.51)
CREAT UR-MCNC: 82.7 MG/DL
SPECIMEN VOL UR: 900 ML
SPECIMEN VOL UR: 900 ML

## 2023-05-24 PROCEDURE — 82570 ASSAY OF URINE CREATININE: CPT

## 2023-05-24 PROCEDURE — 81050 URINALYSIS VOLUME MEASURE: CPT

## 2023-05-24 PROCEDURE — 82340 ASSAY OF CALCIUM IN URINE: CPT

## 2023-05-31 ENCOUNTER — MYC MEDICAL ADVICE (OUTPATIENT)
Dept: ENDOCRINOLOGY | Facility: CLINIC | Age: 78
End: 2023-05-31

## 2023-05-31 DIAGNOSIS — E21.0 PRIMARY HYPERPARATHYROIDISM (H): ICD-10-CM

## 2023-05-31 DIAGNOSIS — E83.52 HYPERCALCEMIA: Primary | ICD-10-CM

## 2023-05-31 DIAGNOSIS — E03.9 HYPOTHYROIDISM, UNSPECIFIED TYPE: ICD-10-CM

## 2023-06-26 ENCOUNTER — IMMUNIZATION (OUTPATIENT)
Dept: FAMILY MEDICINE | Facility: CLINIC | Age: 78
End: 2023-06-26
Payer: COMMERCIAL

## 2023-06-26 PROCEDURE — 0124A COVID-19 BIVALENT 12+ (PFIZER): CPT

## 2023-06-26 PROCEDURE — 91312 COVID-19 BIVALENT 12+ (PFIZER): CPT

## 2023-07-24 DIAGNOSIS — F41.9 ANXIETY: ICD-10-CM

## 2023-07-25 RX ORDER — ESCITALOPRAM OXALATE 5 MG/1
5 TABLET ORAL DAILY
Qty: 90 TABLET | Refills: 2 | Status: SHIPPED | OUTPATIENT
Start: 2023-07-25 | End: 2023-08-04 | Stop reason: SINTOL

## 2023-07-25 NOTE — TELEPHONE ENCOUNTER
"Last Written Prescription Date:  4/3/23  Last Fill Quantity: 30,  # refills: 1   Last office visit provider:  4/3/23     Requested Prescriptions   Pending Prescriptions Disp Refills    escitalopram (LEXAPRO) 5 MG tablet 30 tablet 1     Sig: Take 1 tablet (5 mg) by mouth daily       SSRIs Protocol Passed - 7/24/2023  5:50 PM        Passed - Recent (12 mo) or future (30 days) visit within the authorizing provider's specialty     Patient has had an office visit with the authorizing provider or a provider within the authorizing providers department within the previous 12 mos or has a future within next 30 days. See \"Patient Info\" tab in inbasket, or \"Choose Columns\" in Meds & Orders section of the refill encounter.              Passed - Medication is active on med list        Passed - Patient is age 18 or older        Passed - No active pregnancy on record        Passed - No positive pregnancy test in last 12 months             Edward Holland RN 07/25/23 9:01 AM  "

## 2023-08-04 ENCOUNTER — OFFICE VISIT (OUTPATIENT)
Dept: FAMILY MEDICINE | Facility: CLINIC | Age: 78
End: 2023-08-04
Payer: COMMERCIAL

## 2023-08-04 VITALS
BODY MASS INDEX: 29.73 KG/M2 | HEART RATE: 79 BPM | SYSTOLIC BLOOD PRESSURE: 138 MMHG | WEIGHT: 185 LBS | DIASTOLIC BLOOD PRESSURE: 76 MMHG | RESPIRATION RATE: 20 BRPM | TEMPERATURE: 98.1 F | OXYGEN SATURATION: 97 % | HEIGHT: 66 IN

## 2023-08-04 DIAGNOSIS — I10 ESSENTIAL HYPERTENSION: Primary | ICD-10-CM

## 2023-08-04 DIAGNOSIS — I10 WHITE COAT SYNDROME WITH HYPERTENSION: ICD-10-CM

## 2023-08-04 DIAGNOSIS — F41.9 ANXIETY: ICD-10-CM

## 2023-08-04 PROCEDURE — 99214 OFFICE O/P EST MOD 30 MIN: CPT | Performed by: FAMILY MEDICINE

## 2023-08-04 ASSESSMENT — PATIENT HEALTH QUESTIONNAIRE - PHQ9
SUM OF ALL RESPONSES TO PHQ QUESTIONS 1-9: 0
SUM OF ALL RESPONSES TO PHQ QUESTIONS 1-9: 0
10. IF YOU CHECKED OFF ANY PROBLEMS, HOW DIFFICULT HAVE THESE PROBLEMS MADE IT FOR YOU TO DO YOUR WORK, TAKE CARE OF THINGS AT HOME, OR GET ALONG WITH OTHER PEOPLE: NOT DIFFICULT AT ALL

## 2023-08-04 ASSESSMENT — ANXIETY QUESTIONNAIRES
GAD7 TOTAL SCORE: 0
6. BECOMING EASILY ANNOYED OR IRRITABLE: NOT AT ALL
IF YOU CHECKED OFF ANY PROBLEMS ON THIS QUESTIONNAIRE, HOW DIFFICULT HAVE THESE PROBLEMS MADE IT FOR YOU TO DO YOUR WORK, TAKE CARE OF THINGS AT HOME, OR GET ALONG WITH OTHER PEOPLE: NOT DIFFICULT AT ALL
5. BEING SO RESTLESS THAT IT IS HARD TO SIT STILL: NOT AT ALL
GAD7 TOTAL SCORE: 0
1. FEELING NERVOUS, ANXIOUS, OR ON EDGE: NOT AT ALL
7. FEELING AFRAID AS IF SOMETHING AWFUL MIGHT HAPPEN: NOT AT ALL
3. WORRYING TOO MUCH ABOUT DIFFERENT THINGS: NOT AT ALL
2. NOT BEING ABLE TO STOP OR CONTROL WORRYING: NOT AT ALL
4. TROUBLE RELAXING: NOT AT ALL

## 2023-08-04 NOTE — PROGRESS NOTES
"  Assessment & Plan     ICD-10-CM    1. Essential hypertension  I10       2. Anxiety  F41.9       3. White coat syndrome with hypertension  I10           Patient with anxiety and whitecoat syndrome presents today for follow-up.  At last visit, I started her on Lexapro and I had wanted to see her back in 3 months time.  She took Lexapro for 42 days and then stop as she felt it was causing her to be depressed.  She comes today and initially blood pressure is again high but on recheck settles down.  She again questions if she needs to be on additional medication to control blood pressure.  With nearly normotensive numbers on recheck, discussed with the patient at this time we should continue to monitor.  She wants to follow back every 3 months.  This is reasonable.       BMI:   Estimated body mass index is 29.86 kg/m  as calculated from the following:    Height as of this encounter: 1.676 m (5' 6\").    Weight as of this encounter: 83.9 kg (185 lb).       MEDICATIONS:  Continue current medications without change  See Patient Instructions    Total time spent including chart review, patient interview, discussion of plan of care, counseling and documentation exceeded 30 minutes.    Kimmie Cain MD  [facility]      Arianne Méndez is a 77 year old, presenting for the following health issues:  Blood Pressure Check (Currently taking Lisinopril 20 mg daily) and Anxiety (Stopped escitalopram because it caused side effects of depression, sleeping too much)        8/4/2023     1:54 PM   Additional Questions   Roomed by Marisa HILL LPN       History of Present Illness       Hypertension: She presents for follow up of hypertension.  She does check blood pressure  regularly outside of the clinic. Outside blood pressures have been over 140/90. She follows a low salt diet.     She eats 4 or more servings of fruits and vegetables daily.She consumes 0 sweetened beverage(s) daily.She exercises with enough " effort to increase her heart rate 10 to 19 minutes per day.  She exercises with enough effort to increase her heart rate 4 days per week.   She is taking medications regularly.       Anxiety Follow-Up  How are you doing with your anxiety since your last visit? Improved, has been off escitalopram x 3 weeks due to side effects   Are you having other symptoms that might be associated with anxiety? No  Have you had a significant life event? No   Are you feeling depressed? No  Do you have any concerns with your use of alcohol or other drugs? No    Social History     Tobacco Use    Smoking status: Never    Smokeless tobacco: Never   Substance Use Topics    Alcohol use: Yes     Comment: Alcoholic Drinks/day: rarely    Drug use: No         3/9/2020     9:00 AM 8/4/2023     1:49 PM   BRENDA-7 SCORE   Total Score  0 (minimal anxiety)   Total Score 0 0         3/9/2020     8:45 AM 3/29/2021     9:00 AM 8/4/2023     1:48 PM   PHQ   PHQ-9 Total Score 0 7 0   Q9: Thoughts of better off dead/self-harm past 2 weeks Not at all Not at all Not at all     Patient Active Problem List   Diagnosis    Hypercalcemia    Acute blood loss anemia    Colon polyps    Essential hypertension    History of left knee replacement    Hypothyroidism    Knee osteoarthritis    Benign neoplasm of sigmoid colon    Diverticular disease of large intestine    History of total right knee replacement     Current Outpatient Medications   Medication    co-enzyme Q-10 100 MG CAPS capsule    levothyroxine (SYNTHROID/LEVOTHROID) 75 MCG tablet    lisinopril (ZESTRIL) 20 MG tablet    niacin (SLO-NIACIN) 500 MG CR tablet    Omega-3 1000 MG capsule    pravastatin (PRAVACHOL) 20 MG tablet    triamcinolone (KENALOG) 0.1 % external ointment    vitamin (B COMPLEX) capsule     No current facility-administered medications for this visit.         Review of Systems   Constitutional, HEENT, cardiovascular, pulmonary, gi and gu systems are negative, except as otherwise noted.     "  Objective    /76   Pulse 79   Temp 98.1  F (36.7  C) (Oral)   Resp 20   Ht 1.676 m (5' 6\")   Wt 83.9 kg (185 lb)   SpO2 97%   BMI 29.86 kg/m    Body mass index is 29.86 kg/m .  Physical Exam   GENERAL: healthy, alert and no distress    Lab on 05/22/2023   Component Date Value Ref Range Status    Sodium 05/22/2023 143  136 - 145 mmol/L Final    Potassium 05/22/2023 4.7  3.4 - 5.3 mmol/L Final    Chloride 05/22/2023 104  98 - 107 mmol/L Final    Carbon Dioxide (CO2) 05/22/2023 28  22 - 29 mmol/L Final    Anion Gap 05/22/2023 11  7 - 15 mmol/L Final    Urea Nitrogen 05/22/2023 18.5  8.0 - 23.0 mg/dL Final    Creatinine 05/22/2023 0.76  0.51 - 0.95 mg/dL Final    Calcium 05/22/2023 10.7 (H)  8.8 - 10.2 mg/dL Final    Glucose 05/22/2023 86  70 - 99 mg/dL Final    GFR Estimate 05/22/2023 79  >60 mL/min/1.73m2 Final    eGFR calculated using 2021 CKD-EPI equation.    Calcium Urine mg/dL 05/24/2023 30.5  mg/dL Final    The reference ranges have not been established in urine calcium. The results should be integrated into the clinical context for interpretation.    Duration in hours 05/24/2023 24.0  h Final    Start 05/23 @ 7am - End 5/24 @ 7am  Start 5/23 @ 7am - End 5/24 @ 7am  Start 5/23 @ 7am - End 5/24 @ 7am  Start 05/23 @ 7am - End 5/24 @ 7am  Start 05/23 @ 7am - End 5/24 @ 7am  Start 5/23 @ 7am - End 5/24 @ 7am  Start 5/23 @ 7am - End 5/24 @ 7am  Start 05/23 @ 7am - End 5/24 @ 7am  Start 05/23 @ 7am - End 5/24 @ 7am  Start 05/23 @ 7am - End 5/24 @ 7am    Volume in mL 05/24/2023 900  mL Final    ml  ml  ml  ml  ml  ml  ml  ml  ml  ml    Calcium Urine g/spec 05/24/2023 0.27  0.10 - 0.30 g/spec Final    Reference range applicable for 24 hour only    Creatinine Urine mg/dL 05/24/2023 82.7  mg/dL Final    The reference ranges have not been established in urine creatinine. The results should be integrated into the clinical context for interpretation.    Duration in hours 05/24/2023 24.0  h Final    Start 05/23 " @ 7am - End 5/24 @ 7am  Start 5/23 @ 7am - End 5/24 @ 7am  Start 5/23 @ 7am - End 5/24 @ 7am  Start 05/23 @ 7am - End 5/24 @ 7am  Start 05/23 @ 7am - End 5/24 @ 7am  Start 5/23 @ 7am - End 5/24 @ 7am  Start 5/23 @ 7am - End 5/24 @ 7am    Volume in mL 05/24/2023 900  mL Final    ml  ml  ml  ml  ml  ml  ml    Creatinine Urine Timed g/spec 05/24/2023 0.74  0.72 - 1.51 Final

## 2023-09-06 ENCOUNTER — TRANSFERRED RECORDS (OUTPATIENT)
Dept: HEALTH INFORMATION MANAGEMENT | Facility: CLINIC | Age: 78
End: 2023-09-06
Payer: COMMERCIAL

## 2023-10-02 ENCOUNTER — TRANSFERRED RECORDS (OUTPATIENT)
Dept: HEALTH INFORMATION MANAGEMENT | Facility: CLINIC | Age: 78
End: 2023-10-02
Payer: COMMERCIAL

## 2023-10-30 ENCOUNTER — MYC MEDICAL ADVICE (OUTPATIENT)
Dept: FAMILY MEDICINE | Facility: CLINIC | Age: 78
End: 2023-10-30
Payer: COMMERCIAL

## 2023-11-01 DIAGNOSIS — I10 ESSENTIAL HYPERTENSION: ICD-10-CM

## 2023-11-01 RX ORDER — LISINOPRIL 20 MG/1
20 TABLET ORAL DAILY
Qty: 90 TABLET | Refills: 0 | Status: SHIPPED | OUTPATIENT
Start: 2023-11-01 | End: 2024-01-29

## 2023-11-02 ENCOUNTER — IMMUNIZATION (OUTPATIENT)
Dept: FAMILY MEDICINE | Facility: CLINIC | Age: 78
End: 2023-11-02
Payer: COMMERCIAL

## 2023-11-02 PROCEDURE — 91320 SARSCV2 VAC 30MCG TRS-SUC IM: CPT

## 2023-11-02 PROCEDURE — G0008 ADMIN INFLUENZA VIRUS VAC: HCPCS

## 2023-11-02 PROCEDURE — 90662 IIV NO PRSV INCREASED AG IM: CPT

## 2023-11-02 PROCEDURE — 90480 ADMN SARSCOV2 VAC 1/ONLY CMP: CPT

## 2023-12-08 ENCOUNTER — LAB (OUTPATIENT)
Dept: LAB | Facility: CLINIC | Age: 78
End: 2023-12-08
Payer: COMMERCIAL

## 2023-12-08 DIAGNOSIS — E83.52 HYPERCALCEMIA: ICD-10-CM

## 2023-12-08 DIAGNOSIS — E03.9 HYPOTHYROIDISM, UNSPECIFIED TYPE: ICD-10-CM

## 2023-12-08 DIAGNOSIS — E21.0 PRIMARY HYPERPARATHYROIDISM (H): ICD-10-CM

## 2023-12-08 LAB
ALBUMIN SERPL BCG-MCNC: 4.1 G/DL (ref 3.5–5.2)
ALP SERPL-CCNC: 61 U/L (ref 40–150)
ALT SERPL W P-5'-P-CCNC: 17 U/L (ref 0–50)
ANION GAP SERPL CALCULATED.3IONS-SCNC: 10 MMOL/L (ref 7–15)
AST SERPL W P-5'-P-CCNC: 25 U/L (ref 0–45)
BILIRUB SERPL-MCNC: 0.6 MG/DL
BUN SERPL-MCNC: 14.5 MG/DL (ref 8–23)
CALCIUM SERPL-MCNC: 10.4 MG/DL (ref 8.8–10.2)
CHLORIDE SERPL-SCNC: 106 MMOL/L (ref 98–107)
CREAT SERPL-MCNC: 0.64 MG/DL (ref 0.51–0.95)
DEPRECATED HCO3 PLAS-SCNC: 27 MMOL/L (ref 22–29)
EGFRCR SERPLBLD CKD-EPI 2021: 89 ML/MIN/1.73M2
GLUCOSE SERPL-MCNC: 91 MG/DL (ref 70–99)
PHOSPHATE SERPL-MCNC: 3.2 MG/DL (ref 2.5–4.5)
POTASSIUM SERPL-SCNC: 4.5 MMOL/L (ref 3.4–5.3)
PROT SERPL-MCNC: 6.8 G/DL (ref 6.4–8.3)
PTH-INTACT SERPL-MCNC: 69 PG/ML (ref 15–65)
SODIUM SERPL-SCNC: 143 MMOL/L (ref 135–145)
T4 FREE SERPL-MCNC: 1.75 NG/DL (ref 0.9–1.7)
TSH SERPL DL<=0.005 MIU/L-ACNC: 4.5 UIU/ML (ref 0.3–4.2)
VIT D+METAB SERPL-MCNC: 36 NG/ML (ref 20–50)

## 2023-12-08 PROCEDURE — 84443 ASSAY THYROID STIM HORMONE: CPT

## 2023-12-08 PROCEDURE — 82306 VITAMIN D 25 HYDROXY: CPT

## 2023-12-08 PROCEDURE — 84100 ASSAY OF PHOSPHORUS: CPT

## 2023-12-08 PROCEDURE — 84439 ASSAY OF FREE THYROXINE: CPT

## 2023-12-08 PROCEDURE — 80053 COMPREHEN METABOLIC PANEL: CPT

## 2023-12-08 PROCEDURE — 36415 COLL VENOUS BLD VENIPUNCTURE: CPT

## 2023-12-08 PROCEDURE — 83970 ASSAY OF PARATHORMONE: CPT

## 2023-12-15 ENCOUNTER — OFFICE VISIT (OUTPATIENT)
Dept: ENDOCRINOLOGY | Facility: CLINIC | Age: 78
End: 2023-12-15
Payer: COMMERCIAL

## 2023-12-15 VITALS
SYSTOLIC BLOOD PRESSURE: 138 MMHG | HEART RATE: 88 BPM | BODY MASS INDEX: 29.54 KG/M2 | DIASTOLIC BLOOD PRESSURE: 76 MMHG | WEIGHT: 183 LBS

## 2023-12-15 DIAGNOSIS — E83.52 HYPERCALCEMIA: Primary | ICD-10-CM

## 2023-12-15 DIAGNOSIS — M85.80 OSTEOPENIA, UNSPECIFIED LOCATION: ICD-10-CM

## 2023-12-15 DIAGNOSIS — E21.0 PRIMARY HYPERPARATHYROIDISM (H): ICD-10-CM

## 2023-12-15 DIAGNOSIS — E03.9 HYPOTHYROIDISM, UNSPECIFIED TYPE: ICD-10-CM

## 2023-12-15 PROCEDURE — 99214 OFFICE O/P EST MOD 30 MIN: CPT | Performed by: INTERNAL MEDICINE

## 2023-12-15 NOTE — PATIENT INSTRUCTIONS
-Can aim for 2 calcium-rich foods each day  -Continue vitamin D3 1000 IU daily  -Continue levothyroxine to 75 mcg daily  -Keep well hydrated  -Give some thought to medication for bone health (Fosamax or Actonel)--please review reading materials  -Follow-up in summer 2024, with labs before visit   -We will communicate results via [email or web address], or if needed by phone

## 2023-12-15 NOTE — LETTER
12/15/2023         RE: Kat Diego  [address]  [facility] [address]        Dear Colleague,    Thank you for referring your patient, Kat Diego, to the [facility]. Please see a copy of my visit note below.      ENDOCRINOLOGY FOLLOW-UP    HISTORY OF PRESENT ILLNESS    Kat Diego is seen in follow-up.    1.  Primary hyperparathyroidism.      Has actually been trying to minimize calcium intake given hypercalcemia.  However, she enjoys eating cheese.  Having oat milk with cereal each day.    Continues on vitamin D3 1000 IU daily.    Had forearm DXA scan performed on 5/17/2023: I reviewed images.  T-score at the one third radius is -1.4.    At last visit she noted that she looked into her sister's history of hypercalcemia: Her sister has known hypercalcemia but Ms. Diego is not certain of the cause.  Her sister also has hypothyroidism.    2.  Osteopenia.  No falls or fractures since last visit.    As above, trying to cut back on calcium intake.    She does not have dysphagia.    Sees dentist regularly: Not anticipating invasive oral surgery.    3.  Hypothyroidism.  We increased levothyroxine to 75 mcg daily at last visit in 1/2023.  She continues on this dose.  Energy is fairly good.    Pertinent endocrine and related history:  1. Hypercalcemia.  Noted as far back as 2016. Also later found to have elevated PTH.   -On hydrochlorothiazide for hypertension.   -No history of nephrolithiasis.  -Family history notable with sister with hypercalcemia.   -We discontinued hydrochlorothiazide with the guidance of her PCP in 10/2022; hypercalcemia persisted  2. Low bone density/osteopenia.  -No fracture history.  -Last DXA scan in 09/2022.  L2-L4 T score 1.1.  Discrepant BMD at L1.  Nonetheless, and interpretation L1-L4 was compared to prior study and showed 1.2% decline in BMD compared to 6/7/2019.  Left femoral neck T score -1.2, left total hip T score -0.6.  1.2%  increase in BMD compared to 6/7/2019.  Right femoral neck T score -0.8, right total hip T score -0.7.  0.1% increase in BMD compared to 6/7/2019.  3. Hypothyroidism.    Pertinent Social History: Patient is . She has one son and step children with whom she is close with. 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 Patient has an active social life and enjoys her [employer] fitness class, golfing, having lunch with friends, and watching movies.     PAST MEDICAL HISTORY  Past Medical History:   Diagnosis Date     Allergic rhinitis      Atopic dermatitis      Disease of thyroid gland     hypo     Diverticul disease small and large intestine, no perforati or abscess      Rosacea        MEDICATIONS  Current Outpatient Medications   Medication Sig Dispense Refill     co-enzyme Q-10 100 MG CAPS capsule Take 100 mg by mouth       levothyroxine (SYNTHROID/LEVOTHROID) 75 MCG tablet Take 1 tablet (75 mcg) by mouth daily 90 tablet 3     lisinopril (ZESTRIL) 20 MG tablet Take 1 tablet (20 mg) by mouth daily 90 tablet 0     niacin (SLO-NIACIN) 500 MG CR tablet Take 500 mg by mouth       Omega-3 1000 MG capsule Take 2 g by mouth       pravastatin (PRAVACHOL) 20 MG tablet Take 1 tablet (20 mg) by mouth daily 90 tablet 3     triamcinolone (KENALOG) 0.1 % external ointment Apply topically 2 times daily 30 g 1     vitamin (B COMPLEX) capsule Take 1 capsule by mouth every other day          Allergies, family, and social history were reviewed and documented as needed in EHR.     REVIEW OF SYSTEMS  A focused ROS was performed, with pertinent positives and negatives as noted in the HPI.    PHYSICAL EXAM  /76 (BP Location: Right arm, Patient Position: Sitting, Cuff Size: Adult Regular)   Pulse 88   Wt 83 kg (183 lb)   BMI 29.54 kg/m    Body mass index is 29.54 kg/m .  Constitutional: Vital signs reviewed, as recorded above. Patient is alert, oriented and appears in no acute distress.  Eyes: PER, EOMI, no stare, lid lag,  or retraction; no conjunctival injection.  Respiratory: Normal chest wall motion and respiratory effort.  MSK: No clubbing or cyanosis; normal muscle bulk and tone.  Skin: No lesions on visible skin,  Neurological: Alert and oriented times 3. No tremor.    DATA REVIEW  Each of the following laboratory and/or imaging studies were reviewed.    DXA as in HPI.    Component      Latest Ref Rng 5/24/2023  9:23 AM 12/8/2023  8:47 AM   Sodium      135 - 145 mmol/L  143    Potassium      3.4 - 5.3 mmol/L  4.5    Carbon Dioxide (CO2)      22 - 29 mmol/L  27    Anion Gap      7 - 15 mmol/L  10    Urea Nitrogen      8.0 - 23.0 mg/dL  14.5    Creatinine      0.51 - 0.95 mg/dL  0.64    GFR Estimate      >60 mL/min/1.73m2  89    Calcium      8.8 - 10.2 mg/dL  10.4 (H)    Chloride      98 - 107 mmol/L  106    Glucose      70 - 99 mg/dL  91    Alkaline Phosphatase      40 - 150 U/L  61    AST      0 - 45 U/L  25    ALT      0 - 50 U/L  17    Protein Total      6.4 - 8.3 g/dL  6.8    Albumin      3.5 - 5.2 g/dL  4.1    Bilirubin Total      <=1.2 mg/dL  0.6    Calcium Urine mg/dL      mg/dL 30.5     Duration in hours      h 24.0     Duration in hours      h 24.0     Volume in mL      mL 900     Volume in mL      mL 900     Calcium Urine g/24 h      0.10 - 0.30 g/spec 0.27     Creatinine Urine      mg/dL 82.7     Creatinine Urine Timed      0.72 - 1.51  0.74     Vitamin D, Total (25-Hydroxy)      20 - 50 ng/mL  36    Parathyroid Hormone Intact      15 - 65 pg/mL  69 (H)    Phosphorus      2.5 - 4.5 mg/dL  3.2    TSH      0.30 - 4.20 uIU/mL  4.50 (H)    T4 Free      0.90 - 1.70 ng/dL  1.75 (H)       Legend:  (H) High      ASSESSMENT  1.  Primary hyperparathyroidism.  Hypercalcemia in the face of inappropriately elevated PTH and 24 urine calcium excretion of 270 mg per 24 hours: Consistent with primary hyperparathyroidism.  No osteoporosis, mild hypercalcemia, no history of kidney stones and no history of kidney disease.  Therefore,  would defer surgical intervention, especially since she would be higher risk given her age.  She is open to surgical intervention if necessary.    2.  Low bone density.  No occult fracture on spine x-rays.  At last visit, I recalculated fracture risk using FRAX calculator: Her 10-year fracture risk is 20% for all osteoporotic fractures, 9.9% for hip fracture (calculation includes mother's history of hip fracture).  This would make her a candidate for pharmacologic therapy.  We discussed benefits and potential side effects of oral bisphosphonates (she has no dysphagia) including GI side effects, arthralgias, ONJ and AFF.  She would like to give more thought to treatment: I have given Ms. Diego printed reading materials and we can discuss at her next appointment.  In the meantime, aim for moderate dietary calcium intake (we discussed this in detail today) and continue vitamin D supplementation.    3. Hypothyroidism.  TSH just above upper limit of normal.  Would continue levothyroxine without changes since she feels well.  Recheck thyroid function tests before next visit and adjust levothyroxine dose if needed.    PLAN  -Can aim for 2 calcium-rich foods each day  -Continue vitamin D3 1000 IU daily  -Continue levothyroxine to 75 mcg daily  -Keep well hydrated  -Give some thought to medication for bone health (Fosamax or Actonel)--please review reading materials  -Follow-up in summer 2024, with labs before visit   -We will communicate results via [employer], or if needed by phone      Orders Placed This Encounter   Procedures     Vitamin D Deficiency     Albumin level     Basic metabolic panel     TSH with free T4 reflex     I spent a total of 33 minutes on the date of encounter reviewing medical records, evaluating the patient, coordinating care and documenting in the EHR, as detailed above.    Jorge Snow MD   Division of Diabetes, Endocrinology and Metabolism  Department of Medicine      Again, thank you for  allowing me to participate in the care of your patient.        Sincerely,        DEBI Snow MD

## 2023-12-15 NOTE — PROGRESS NOTES
ENDOCRINOLOGY FOLLOW-UP    HISTORY OF PRESENT ILLNESS    Kat Diego is seen in follow-up.    1.  Primary hyperparathyroidism.      Has actually been trying to minimize calcium intake given hypercalcemia.  However, she enjoys eating cheese.  Having oat milk with cereal each day.    Continues on vitamin D3 1000 IU daily.    Had forearm DXA scan performed on 5/17/2023: I reviewed images.  T-score at the one third radius is -1.4.    At last visit she noted that she looked into her sister's history of hypercalcemia: Her sister has known hypercalcemia but Ms. Diego is not certain of the cause.  Her sister also has hypothyroidism.    2.  Osteopenia.  No falls or fractures since last visit.    As above, trying to cut back on calcium intake.    She does not have dysphagia.    Sees dentist regularly: Not anticipating invasive oral surgery.    3.  Hypothyroidism.  We increased levothyroxine to 75 mcg daily at last visit in 1/2023.  She continues on this dose.  Energy is fairly good.    Pertinent endocrine and related history:  1. Hypercalcemia.  Noted as far back as 2016. Also later found to have elevated PTH.   -On hydrochlorothiazide for hypertension.   -No history of nephrolithiasis.  -Family history notable with sister with hypercalcemia.   -We discontinued hydrochlorothiazide with the guidance of her PCP in 10/2022; hypercalcemia persisted  2. Low bone density/osteopenia.  -No fracture history.  -Last DXA scan in 09/2022.  L2-L4 T score 1.1.  Discrepant BMD at L1.  Nonetheless, and interpretation L1-L4 was compared to prior study and showed 1.2% decline in BMD compared to 6/7/2019.  Left femoral neck T score -1.2, left total hip T score -0.6.  1.2% increase in BMD compared to 6/7/2019.  Right femoral neck T score -0.8, right total hip T score -0.7.  0.1% increase in BMD compared to 6/7/2019.  3. Hypothyroidism.    Pertinent Social History: Patient is . She has one son and step children with whom she is  close with. 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 Patient has an active social life and enjoys her [employer] fitness class, golfing, having lunch with friends, and watching movies.     PAST MEDICAL HISTORY  Past Medical History:   Diagnosis Date    Allergic rhinitis     Atopic dermatitis     Disease of thyroid gland     hypo    Diverticul disease small and large intestine, no perforati or abscess     Rosacea        MEDICATIONS  Current Outpatient Medications   Medication Sig Dispense Refill    co-enzyme Q-10 100 MG CAPS capsule Take 100 mg by mouth      levothyroxine (SYNTHROID/LEVOTHROID) 75 MCG tablet Take 1 tablet (75 mcg) by mouth daily 90 tablet 3    lisinopril (ZESTRIL) 20 MG tablet Take 1 tablet (20 mg) by mouth daily 90 tablet 0    niacin (SLO-NIACIN) 500 MG CR tablet Take 500 mg by mouth      Omega-3 1000 MG capsule Take 2 g by mouth      pravastatin (PRAVACHOL) 20 MG tablet Take 1 tablet (20 mg) by mouth daily 90 tablet 3    triamcinolone (KENALOG) 0.1 % external ointment Apply topically 2 times daily 30 g 1    vitamin (B COMPLEX) capsule Take 1 capsule by mouth every other day          Allergies, family, and social history were reviewed and documented as needed in EHR.     REVIEW OF SYSTEMS  A focused ROS was performed, with pertinent positives and negatives as noted in the HPI.    PHYSICAL EXAM  /76 (BP Location: Right arm, Patient Position: Sitting, Cuff Size: Adult Regular)   Pulse 88   Wt 83 kg (183 lb)   BMI 29.54 kg/m    Body mass index is 29.54 kg/m .  Constitutional: Vital signs reviewed, as recorded above. Patient is alert, oriented and appears in no acute distress.  Eyes: PER, EOMI, no stare, lid lag, or retraction; no conjunctival injection.  Respiratory: Normal chest wall motion and respiratory effort.  MSK: No clubbing or cyanosis; normal muscle bulk and tone.  Skin: No lesions on visible skin,  Neurological: Alert and oriented times 3. No tremor.    DATA REVIEW  Each of  the following laboratory and/or imaging studies were reviewed.    DXA as in HPI.    Component      Latest Ref Rng 5/24/2023  9:23 AM 12/8/2023  8:47 AM   Sodium      135 - 145 mmol/L  143    Potassium      3.4 - 5.3 mmol/L  4.5    Carbon Dioxide (CO2)      22 - 29 mmol/L  27    Anion Gap      7 - 15 mmol/L  10    Urea Nitrogen      8.0 - 23.0 mg/dL  14.5    Creatinine      0.51 - 0.95 mg/dL  0.64    GFR Estimate      >60 mL/min/1.73m2  89    Calcium      8.8 - 10.2 mg/dL  10.4 (H)    Chloride      98 - 107 mmol/L  106    Glucose      70 - 99 mg/dL  91    Alkaline Phosphatase      40 - 150 U/L  61    AST      0 - 45 U/L  25    ALT      0 - 50 U/L  17    Protein Total      6.4 - 8.3 g/dL  6.8    Albumin      3.5 - 5.2 g/dL  4.1    Bilirubin Total      <=1.2 mg/dL  0.6    Calcium Urine mg/dL      mg/dL 30.5     Duration in hours      h 24.0     Duration in hours      h 24.0     Volume in mL      mL 900     Volume in mL      mL 900     Calcium Urine g/24 h      0.10 - 0.30 g/spec 0.27     Creatinine Urine      mg/dL 82.7     Creatinine Urine Timed      0.72 - 1.51  0.74     Vitamin D, Total (25-Hydroxy)      20 - 50 ng/mL  36    Parathyroid Hormone Intact      15 - 65 pg/mL  69 (H)    Phosphorus      2.5 - 4.5 mg/dL  3.2    TSH      0.30 - 4.20 uIU/mL  4.50 (H)    T4 Free      0.90 - 1.70 ng/dL  1.75 (H)       Legend:  (H) High      ASSESSMENT  1.  Primary hyperparathyroidism.  Hypercalcemia in the face of inappropriately elevated PTH and 24 urine calcium excretion of 270 mg per 24 hours: Consistent with primary hyperparathyroidism.  No osteoporosis, mild hypercalcemia, no history of kidney stones and no history of kidney disease.  Therefore, would defer surgical intervention, especially since she would be higher risk given her age.  She is open to surgical intervention if necessary.    2.  Low bone density.  No occult fracture on spine x-rays.  At last visit, I recalculated fracture risk using FRAX calculator: Her  10-year fracture risk is 20% for all osteoporotic fractures, 9.9% for hip fracture (calculation includes mother's history of hip fracture).  This would make her a candidate for pharmacologic therapy.  We discussed benefits and potential side effects of oral bisphosphonates (she has no dysphagia) including GI side effects, arthralgias, ONJ and AFF.  She would like to give more thought to treatment: I have given Ms. Diego printed reading materials and we can discuss at her next appointment.  In the meantime, aim for moderate dietary calcium intake (we discussed this in detail today) and continue vitamin D supplementation.    3. Hypothyroidism.  TSH just above upper limit of normal.  Would continue levothyroxine without changes since she feels well.  Recheck thyroid function tests before next visit and adjust levothyroxine dose if needed.    PLAN  -Can aim for 2 calcium-rich foods each day  -Continue vitamin D3 1000 IU daily  -Continue levothyroxine to 75 mcg daily  -Keep well hydrated  -Give some thought to medication for bone health (Fosamax or Actonel)--please review reading materials  -Follow-up in summer 2024, with labs before visit   -We will communicate results via [employer], or if needed by phone      Orders Placed This Encounter   Procedures    Vitamin D Deficiency    Albumin level    Basic metabolic panel    TSH with free T4 reflex     I spent a total of 33 minutes on the date of encounter reviewing medical records, evaluating the patient, coordinating care and documenting in the EHR, as detailed above.    Jorge Snow MD   Division of Diabetes, Endocrinology and Metabolism  Department of Medicine

## 2024-01-29 DIAGNOSIS — I10 ESSENTIAL HYPERTENSION: ICD-10-CM

## 2024-01-29 RX ORDER — LISINOPRIL 20 MG/1
20 TABLET ORAL DAILY
Qty: 90 TABLET | Refills: 1 | Status: SHIPPED | OUTPATIENT
Start: 2024-01-29 | End: 2024-07-22

## 2024-03-18 DIAGNOSIS — M85.80 OSTEOPENIA, UNSPECIFIED LOCATION: ICD-10-CM

## 2024-03-18 DIAGNOSIS — E03.9 HYPOTHYROIDISM, UNSPECIFIED TYPE: ICD-10-CM

## 2024-03-18 DIAGNOSIS — E21.0 PRIMARY HYPERPARATHYROIDISM (H): ICD-10-CM

## 2024-03-18 DIAGNOSIS — E83.52 HYPERCALCEMIA: ICD-10-CM

## 2024-03-18 RX ORDER — LEVOTHYROXINE SODIUM 75 UG/1
75 TABLET ORAL DAILY
Qty: 90 TABLET | Refills: 3 | Status: SHIPPED | OUTPATIENT
Start: 2024-03-18

## 2024-03-20 ENCOUNTER — DOCUMENTATION ONLY (OUTPATIENT)
Dept: FAMILY MEDICINE | Facility: CLINIC | Age: 79
End: 2024-03-20
Payer: COMMERCIAL

## 2024-03-20 DIAGNOSIS — E78.2 MIXED HYPERLIPIDEMIA: ICD-10-CM

## 2024-03-20 DIAGNOSIS — Z86.2 HISTORY OF ANEMIA: ICD-10-CM

## 2024-03-20 DIAGNOSIS — I10 ESSENTIAL HYPERTENSION: Primary | ICD-10-CM

## 2024-03-20 NOTE — PROGRESS NOTES
Kat K Johnathon has an upcoming lab appointment:    Future Appointments   Date Time Provider Department Center   4/5/2024  8:30 AM [facility]   4/11/2024  9:50 AM [facility]   8/14/2024  8:30 AM [facility]   8/21/2024  3:30 PM Jorge Snow MD [facility]     Patient is scheduled for the following lab(s):     Patient is requesting [facility] labwork prior to visit. Please enter orders if appropriate, thanks!     There is no order available. Please review and place either future orders or HMPO (Review of Health Maintenance Protocol Orders), as appropriate.    Health Maintenance Due   Topic    ANNUAL REVIEW OF HM ORDERS     TEODORO Ayala

## 2024-03-21 PROBLEM — E78.2 MIXED HYPERLIPIDEMIA: Status: ACTIVE | Noted: 2024-03-21

## 2024-04-04 SDOH — HEALTH STABILITY: PHYSICAL HEALTH: ON AVERAGE, HOW MANY MINUTES DO YOU ENGAGE IN EXERCISE AT THIS LEVEL?: 30 MIN

## 2024-04-04 SDOH — HEALTH STABILITY: PHYSICAL HEALTH: ON AVERAGE, HOW MANY DAYS PER WEEK DO YOU ENGAGE IN MODERATE TO STRENUOUS EXERCISE (LIKE A BRISK WALK)?: 4 DAYS

## 2024-04-04 ASSESSMENT — SOCIAL DETERMINANTS OF HEALTH (SDOH): HOW OFTEN DO YOU GET TOGETHER WITH FRIENDS OR RELATIVES?: MORE THAN THREE TIMES A WEEK

## 2024-04-05 ENCOUNTER — LAB (OUTPATIENT)
Dept: LAB | Facility: CLINIC | Age: 79
End: 2024-04-05
Payer: COMMERCIAL

## 2024-04-05 DIAGNOSIS — E78.2 MIXED HYPERLIPIDEMIA: ICD-10-CM

## 2024-04-05 DIAGNOSIS — I10 ESSENTIAL HYPERTENSION: ICD-10-CM

## 2024-04-05 DIAGNOSIS — Z86.2 HISTORY OF ANEMIA: ICD-10-CM

## 2024-04-05 LAB
ALBUMIN SERPL BCG-MCNC: 4.2 G/DL (ref 3.5–5.2)
ALP SERPL-CCNC: 65 U/L (ref 40–150)
ALT SERPL W P-5'-P-CCNC: 18 U/L (ref 0–50)
ANION GAP SERPL CALCULATED.3IONS-SCNC: 8 MMOL/L (ref 7–15)
AST SERPL W P-5'-P-CCNC: 25 U/L (ref 0–45)
BASOPHILS # BLD AUTO: 0.1 10E3/UL (ref 0–0.2)
BASOPHILS NFR BLD AUTO: 1 %
BILIRUB SERPL-MCNC: 0.6 MG/DL
BUN SERPL-MCNC: 15.1 MG/DL (ref 8–23)
CALCIUM SERPL-MCNC: 10 MG/DL (ref 8.8–10.2)
CHLORIDE SERPL-SCNC: 107 MMOL/L (ref 98–107)
CHOLEST SERPL-MCNC: 184 MG/DL
CREAT SERPL-MCNC: 0.67 MG/DL (ref 0.51–0.95)
DEPRECATED HCO3 PLAS-SCNC: 28 MMOL/L (ref 22–29)
EGFRCR SERPLBLD CKD-EPI 2021: 88 ML/MIN/1.73M2
EOSINOPHIL # BLD AUTO: 0.2 10E3/UL (ref 0–0.7)
EOSINOPHIL NFR BLD AUTO: 5 %
ERYTHROCYTE [DISTWIDTH] IN BLOOD BY AUTOMATED COUNT: 13.9 % (ref 10–15)
FASTING STATUS PATIENT QL REPORTED: YES
GLUCOSE SERPL-MCNC: 94 MG/DL (ref 70–99)
HCT VFR BLD AUTO: 42.2 % (ref 35–47)
HDLC SERPL-MCNC: 71 MG/DL
HGB BLD-MCNC: 14 G/DL (ref 11.7–15.7)
IMM GRANULOCYTES # BLD: 0 10E3/UL
IMM GRANULOCYTES NFR BLD: 0 %
LDLC SERPL CALC-MCNC: 98 MG/DL
LYMPHOCYTES # BLD AUTO: 1.3 10E3/UL (ref 0.8–5.3)
LYMPHOCYTES NFR BLD AUTO: 29 %
MCH RBC QN AUTO: 29.9 PG (ref 26.5–33)
MCHC RBC AUTO-ENTMCNC: 33.2 G/DL (ref 31.5–36.5)
MCV RBC AUTO: 90 FL (ref 78–100)
MONOCYTES # BLD AUTO: 0.3 10E3/UL (ref 0–1.3)
MONOCYTES NFR BLD AUTO: 6 %
NEUTROPHILS # BLD AUTO: 2.8 10E3/UL (ref 1.6–8.3)
NEUTROPHILS NFR BLD AUTO: 59 %
NONHDLC SERPL-MCNC: 113 MG/DL
PLATELET # BLD AUTO: 187 10E3/UL (ref 150–450)
POTASSIUM SERPL-SCNC: 4.2 MMOL/L (ref 3.4–5.3)
PROT SERPL-MCNC: 6.9 G/DL (ref 6.4–8.3)
RBC # BLD AUTO: 4.68 10E6/UL (ref 3.8–5.2)
SODIUM SERPL-SCNC: 143 MMOL/L (ref 135–145)
TRIGL SERPL-MCNC: 74 MG/DL
WBC # BLD AUTO: 4.7 10E3/UL (ref 4–11)

## 2024-04-05 PROCEDURE — 80061 LIPID PANEL: CPT

## 2024-04-05 PROCEDURE — 85025 COMPLETE CBC W/AUTO DIFF WBC: CPT

## 2024-04-05 PROCEDURE — 80053 COMPREHEN METABOLIC PANEL: CPT

## 2024-04-05 PROCEDURE — 36415 COLL VENOUS BLD VENIPUNCTURE: CPT

## 2024-04-11 ENCOUNTER — OFFICE VISIT (OUTPATIENT)
Dept: FAMILY MEDICINE | Facility: CLINIC | Age: 79
End: 2024-04-11
Attending: FAMILY MEDICINE
Payer: COMMERCIAL

## 2024-04-11 VITALS
HEART RATE: 78 BPM | OXYGEN SATURATION: 98 % | HEIGHT: 66 IN | WEIGHT: 183.8 LBS | RESPIRATION RATE: 18 BRPM | BODY MASS INDEX: 29.54 KG/M2 | DIASTOLIC BLOOD PRESSURE: 77 MMHG | SYSTOLIC BLOOD PRESSURE: 139 MMHG

## 2024-04-11 DIAGNOSIS — E21.0 PRIMARY HYPERPARATHYROIDISM (H): ICD-10-CM

## 2024-04-11 DIAGNOSIS — Z00.00 ENCOUNTER FOR MEDICARE ANNUAL WELLNESS EXAM: Primary | ICD-10-CM

## 2024-04-11 DIAGNOSIS — I10 ESSENTIAL HYPERTENSION: ICD-10-CM

## 2024-04-11 DIAGNOSIS — E03.9 ACQUIRED HYPOTHYROIDISM: ICD-10-CM

## 2024-04-11 DIAGNOSIS — E78.2 MIXED HYPERLIPIDEMIA: ICD-10-CM

## 2024-04-11 DIAGNOSIS — E83.52 HYPERCALCEMIA: ICD-10-CM

## 2024-04-11 DIAGNOSIS — Z12.31 VISIT FOR SCREENING MAMMOGRAM: ICD-10-CM

## 2024-04-11 PROBLEM — D12.0 BENIGN NEOPLASM OF CECUM: Status: ACTIVE | Noted: 2023-10-04

## 2024-04-11 PROCEDURE — G0439 PPPS, SUBSEQ VISIT: HCPCS | Performed by: FAMILY MEDICINE

## 2024-04-11 PROCEDURE — 99214 OFFICE O/P EST MOD 30 MIN: CPT | Mod: 25 | Performed by: FAMILY MEDICINE

## 2024-04-11 NOTE — PATIENT INSTRUCTIONS
Preventive Care Advice   This is general advice given by our system to help you stay healthy. However, your care team may have specific advice just for you. Please talk to your care team about your preventive care needs.  Nutrition  Eat 5 or more servings of fruits and vegetables each day.  Try wheat bread, brown rice and whole grain pasta (instead of white bread, rice, and pasta).  Get enough calcium and vitamin D. Check the label on foods and aim for 100% of the RDA (recommended daily allowance).  Lifestyle  Exercise at least 150 minutes each week   (30 minutes a day, 5 days a week).  Do muscle strengthening activities 2 days a week. These help control your weight and prevent disease.  No smoking.  Wear sunscreen to prevent skin cancer.  Have a dental exam and cleaning every 6 months.  Yearly exams  See your health care team every year to talk about:  Any changes in your health.  Any medicines your care team has prescribed.  Preventive care, family planning, and ways to prevent chronic diseases.  Shots (vaccines)   HPV shots (up to age 26), if you've never had them before.  Hepatitis B shots (up to age 59), if you've never had them before.  COVID-19 shot: Get this shot when it's due.  Flu shot: Get a flu shot every year.  Tetanus shot: Get a tetanus shot every 10 years.  Pneumococcal, hepatitis A, and RSV shots: Ask your care team if you need these based on your risk.  Shingles shot (for age 50 and up).  General health tests  Diabetes screening:  Starting at age 35, Get screened for diabetes at least every 3 years.  If you are younger than age 35, ask your care team if you should be screened for diabetes.  Cholesterol test: At age 39, start having a cholesterol test every 5 years, or more often if advised.  Bone density scan (DEXA): At age 50, ask your care team if you should have this scan for osteoporosis (brittle bones).  Hepatitis C: Get tested at least once in your life.  STIs (sexually transmitted  infections)  Before age 24: Ask your care team if you should be screened for STIs.  After age 24: Get screened for STIs if you're at risk. You are at risk for STIs (including HIV) if:  You are sexually active with more than one person.  You don't use condoms every time.  You or a partner was diagnosed with a sexually transmitted infection.  If you are at risk for HIV, ask about PrEP medicine to prevent HIV.  Get tested for HIV at least once in your life, whether you are at risk for HIV or not.  Cancer screening tests  Cervical cancer screening: If you have a cervix, begin getting regular cervical cancer screening tests at age 21. Most people who have regular screenings with normal results can stop after age 65. Talk about this with your provider.  Breast cancer scan (mammogram): If you've ever had breasts, begin having regular mammograms starting at age 40. This is a scan to check for breast cancer.  Colon cancer screening: It is important to start screening for colon cancer at age 45.  Have a colonoscopy test every 10 years (or more often if you're at risk) Or, ask your provider about stool tests like a FIT test every year or Cologuard test every 3 years.  To learn more about your testing options, visit: https://www.[employer]/422636.pdf.  For help making a decision, visit: https://bit.ly/me47100.  Prostate cancer screening test: If you have a prostate and are age 55 to 69, ask your provider if you would benefit from a yearly prostate cancer screening test.  Lung cancer screening: If you are a current or former smoker age 50 to 80, ask your care team if ongoing lung cancer screenings are right for you.  For informational purposes only. Not to replace the advice of your health care provider. Copyright   2023 [employer]. All rights reserved. Clinically reviewed by the [facility]. [employer] 118585 - REV 01/24.    Learning About Stress  What is stress?     Stress is your  body's response to a hard situation. Your body can have a physical, emotional, or mental response. Stress is a fact of life for most people, and it affects everyone differently. What causes stress for you may not be stressful for someone else.  A lot of things can cause stress. You may feel stress when you go on a job interview, take a test, or run a race. This kind of short-term stress is normal and even useful. It can help you if you need to work hard or react quickly. For example, stress can help you finish an important job on time.  Long-term stress is caused by ongoing stressful situations or events. Examples of long-term stress include long-term health problems, ongoing problems at work, or conflicts in your family. Long-term stress can harm your health.  How does stress affect your health?  When you are stressed, your body responds as though you are in danger. It makes hormones that speed up your heart, make you breathe faster, and give you a burst of energy. This is called the fight-or-flight stress response. If the stress is over quickly, your body goes back to normal and no harm is done.  But if stress happens too often or lasts too long, it can have bad effects. Long-term stress can make you more likely to get sick, and it can make symptoms of some diseases worse. If you tense up when you are stressed, you may develop neck, shoulder, or low back pain. Stress is linked to high blood pressure and heart disease.  Stress also harms your emotional health. It can make you carreno, tense, or depressed. Your relationships may suffer, and you may not do well at work or school.  What can you do to manage stress?  You can try these things to help manage stress:   Do something active. Exercise or activity can help reduce stress. Walking is a great way to get started. Even everyday activities such as housecleaning or yard work can help.  Try yoga or iman chi. These techniques combine exercise and meditation. You may need  some training at first to learn them.  Do something you enjoy. For example, listen to music or go to a movie. Practice your hobby or do volunteer work.  Meditate. This can help you relax, because you are not worrying about what happened before or what may happen in the future.  Do guided imagery. Imagine yourself in any setting that helps you feel calm. You can use online videos, books, or a teacher to guide you.  Do breathing exercises. For example:  From a standing position, bend forward from the waist with your knees slightly bent. Let your arms dangle close to the floor.  Breathe in slowly and deeply as you return to a standing position. Roll up slowly and lift your head last.  Hold your breath for just a few seconds in the standing position.  Breathe out slowly and bend forward from the waist.  Let your feelings out. Talk, laugh, cry, and express anger when you need to. Talking with supportive friends or family, a counselor, or a montrell leader about your feelings is a healthy way to relieve stress. Avoid discussing your feelings with people who make you feel worse.  Write. It may help to write about things that are bothering you. This helps you find out how much stress you feel and what is causing it. When you know this, you can find better ways to cope.  What can you do to prevent stress?  You might try some of these things to help prevent stress:  Manage your time. This helps you find time to do the things you want and need to do.  Get enough sleep. Your body recovers from the stresses of the day while you are sleeping.  Get support. Your family, friends, and community can make a difference in how you experience stress.  Limit your news feed. Avoid or limit time on social media or news that may make you feel stressed.  Do something active. Exercise or activity can help reduce stress. Walking is a great way to get started.  Where can you learn more?  Go to https://www.healthwise.net/patiented  Enter N032 in the  "search box to learn more about \"Learning About Stress.\"  Current as of: October 24, 2023               Content Version: 14.0    5144-1835 [employer].   Care instructions adapted under license by your healthcare professional. If you have questions about a medical condition or this instruction, always ask your healthcare professional. [employer] disclaims any warranty or liability for your use of this information.      "

## 2024-04-11 NOTE — PROGRESS NOTES
"Preventive Care Visit  [facility]  Kimmie Cain MD, Family Medicine  Apr 11, 2024      Assessment & Plan     ICD-10-CM    1. Encounter for Medicare annual wellness exam  Z00.00 PRIMARY CARE FOLLOW-UP SCHEDULING      2. Primary hyperparathyroidism (H24)  E21.0       3. Essential hypertension  I10       4. Hypercalcemia  E83.52       5. Mixed hyperlipidemia  E78.2       6. Acquired hypothyroidism  E03.9         Patient presents today for annual wellness exam.  She had her labs done prior to the visit and these were reviewed with her.  Following issues addressed  1: Essential hypertension: Has element of whitecoat hypertension.  Home readings are normal.  Her machine has been calibrated in the past.  Continue current medication  2: Hypercalcemia with primary hypothyroidism: Being followed with endocrinology.  This is stable.  Patient does not have any symptoms.  Continue to monitor.  She does have an upcoming appointment with endocrinology with repeat labs.  3: Hyperlipidemia: On atorvastatin with good control.  Continue current medication.  Reviewed labs that are stable.  4: Hypothyroidism: Stable.  Continue current medication.    Discussed that I do not recommend vitamin D supplementation.  Offered to start Fosamax if desired, patient prefers to wait for her endocrinology appointment.    BMI  Estimated body mass index is 29.67 kg/m  as calculated from the following:    Height as of this encounter: 1.676 m (5' 6\").    Weight as of this encounter: 83.4 kg (183 lb 12.8 oz).       Counseling  Appropriate preventive services were discussed with this patient, including applicable screening as appropriate for fall prevention, nutrition, physical activity, Tobacco-use cessation, weight loss and cognition.  Checklist reviewing preventive services available has been given to the patient.  Reviewed patient's diet, addressing concerns and/or questions.   She is at risk for psychosocial distress " and has been provided with information to reduce risk.       MEDICATIONS:  Continue current medications without change  See Patient Instructions    Subjective   Honey is a 78 year old, presenting for the following:  Wellness Visit (Pt is NOT fasting today, pt states she had blood work done already. Pt is seeing an Endo for her thyroid- they are wanting her to start Fosamax, wants Dr. PANIAGUA to start this medication for her- cannot get back in with Endo until Aug. Can pt take a Vit E capsule??  Pt wants to talk about her calcium. )        4/11/2024     9:41 AM   Additional Questions   Roomed by Meena Veronica CMA         Health Care Directive  Patient has a Health Care Directive on file  Discussed advance care planning with patient.    HPI        4/4/2024   General Health   How would you rate your overall physical health? Excellent   Feel stress (tense, anxious, or unable to sleep) Only a little   (!) STRESS CONCERN      4/4/2024   Nutrition   Diet: Other   If other, please elaborate: 2 servings of calcium daily., Allowed 300 MG         4/4/2024   Exercise   Days per week of moderate/strenous exercise 4 days   Average minutes spent exercising at this level 30 min         4/4/2024   Social Factors   Frequency of gathering with friends or relatives More than three times a week   Worry food won't last until get money to buy more No   Food not last or not have enough money for food? No   Do you have housing?  Yes   Are you worried about losing your housing? No   Lack of transportation? No   Unable to get utilities (heat,electricity)? No         4/11/2024   Fall Risk   Fallen 2 or more times in the past year? No   Trouble with walking or balance? No          4/4/2024   Activities of Daily Living- Home Safety   Needs help with the following daily activites None of the above   Safety concerns in the home None of the above         4/4/2024   Dental   Dentist two times every year? Yes         4/4/2024   Hearing Screening   Hearing  concerns? None of the above         4/4/2024   Driving Risk Screening   Patient/family members have concerns about driving No         4/4/2024   General Alertness/Fatigue Screening   Have you been more tired than usual lately? No         4/4/2024   Urinary Incontinence Screening   Bothered by leaking urine in past 6 months No         4/4/2024   TB Screening   Were you born outside of the [place]? No         Today's PHQ-2 Score:       4/11/2024     9:31 AM   PHQ-2 ( 1999 [employer])   Q1: Little interest or pleasure in doing things 0   Q2: Feeling down, depressed or hopeless 0   PHQ-2 Score 0   Q1: Little interest or pleasure in doing things Not at all   Q2: Feeling down, depressed or hopeless Not at all   PHQ-2 Score 0           4/4/2024   Substance Use   Alcohol more than 3/day or more than 7/wk No   Do you have a current opioid prescription? No   How severe/bad is pain from 1 to 10? 2/10   Do you use any other substances recreationally? (!) INHALANTS     Social History     Tobacco Use    Smoking status: Never    Smokeless tobacco: Never   Substance Use Topics    Alcohol use: Yes     Comment: Alcoholic Drinks/day: rarely    Drug use: No           9/16/2022   LAST FHS-7 RESULTS   1st degree relative breast or ovarian cancer No   Any relative bilateral breast cancer No   Any male have breast cancer No   Any ONE woman have BOTH breast AND ovarian cancer No   Any woman with breast cancer before 50yrs No   2 or more relatives with breast AND/OR ovarian cancer No   2 or more relatives with breast AND/OR bowel cancer No        Mammogram Screening - After age 74- determine frequency with patient based on health status, life expectancy and patient goals              Reviewed and updated as needed this visit by Provider   Tobacco  Allergies  Meds  Problems  Med Hx  Surg Hx  Fam Hx            Past Medical History:   Diagnosis Date    Allergic rhinitis     Atopic dermatitis     Disease of thyroid gland     hypo    Diverticul  disease small and large intestine, no perforati or abscess     Rosacea      Past Surgical History:   Procedure Laterality Date    DENTAL SURGERY      DILATION AND CURETTAGE      TOTAL KNEE ARTHROPLASTY Left 11/25/2015    [facility]; Rolf Helton MD    WISDOM TOOTH EXTRACTION      [facility] TOTAL KNEE ARTHROPLASTY Left 11/25/2015    Procedure: KNEE TOTAL ARTHROPLASTY, LEFT;  Surgeon: Rolf Helton MD;  Location: [facility] Main OR;  Service: Orthopedics    [facility] TOTAL KNEE ARTHROPLASTY Right 9/23/2016    Procedure: RIGHT KNEE TOTAL ARTHROPLASTY;  Surgeon: Rolf Helton MD;  Location: [facility] Main OR;  Service: Orthopedics     BP Readings from Last 3 Encounters:   04/11/24 139/77   12/15/23 138/76   08/04/23 138/76    Wt Readings from Last 3 Encounters:   04/11/24 83.4 kg (183 lb 12.8 oz)   12/15/23 83 kg (183 lb)   08/04/23 83.9 kg (185 lb)                  Patient Active Problem List   Diagnosis    Hypercalcemia    Colon polyps    Essential hypertension    History of left knee replacement    Hypothyroidism    Knee osteoarthritis    Benign neoplasm of sigmoid colon    Diverticular disease of large intestine    History of total right knee replacement    Mixed hyperlipidemia    Primary hyperparathyroidism (H24)    Benign neoplasm of cecum     Past Surgical History:   Procedure Laterality Date    DENTAL SURGERY      DILATION AND CURETTAGE      TOTAL KNEE ARTHROPLASTY Left 11/25/2015    [facility]; Rolf Helton MD    WISDOM TOOTH EXTRACTION      [facility] TOTAL KNEE ARTHROPLASTY Left 11/25/2015    Procedure: KNEE TOTAL ARTHROPLASTY, LEFT;  Surgeon: Rolf Helton MD;  Location: [facility] Main OR;  Service: Orthopedics    [facility] TOTAL KNEE ARTHROPLASTY Right 9/23/2016    Procedure: RIGHT KNEE TOTAL ARTHROPLASTY;  Surgeon: Rolf Helton MD;  Location: [facility] Main OR;  Service: Orthopedics       Social History     Tobacco Use    Smoking status: Never    Smokeless tobacco: Never   Substance  Use Topics    Alcohol use: Yes     Comment: Alcoholic Drinks/day: rarely     Family History   Problem Relation Age of Onset    Heart Disease Mother     Cerebrovascular Disease Mother     Colon Cancer Father     Hyperlipidemia Sister     Hypertension Sister     Heart Disease Brother 60.00        Massive MI,  in sleep         Current Outpatient Medications   Medication Sig Dispense Refill    co-enzyme Q-10 100 MG CAPS capsule Take 100 mg by mouth      levothyroxine (SYNTHROID/LEVOTHROID) 75 MCG tablet Take 1 tablet (75 mcg) by mouth daily 90 tablet 3    lisinopril (ZESTRIL) 20 MG tablet Take 1 tablet (20 mg) by mouth daily 90 tablet 1    niacin (SLO-NIACIN) 500 MG CR tablet Take 500 mg by mouth      Omega-3 1000 MG capsule Take 2 g by mouth      pravastatin (PRAVACHOL) 20 MG tablet Take 1 tablet (20 mg) by mouth daily 90 tablet 3    triamcinolone (KENALOG) 0.1 % external ointment Apply topically 2 times daily 30 g 1    vitamin (B COMPLEX) capsule Take 1 capsule by mouth every other day        Current providers sharing in care for this patient include:  Patient Care Team:  Kimmie Cain MD as PCP - General (Family Medicine)  Kimmie Cain MD as Referring Physician (Family Practice)  Kimmie Cain MD as Assigned PCP  Jorge Snow MD as MD (Endocrinology, Diabetes, and Metabolism)  Jorge Snow MD as Assigned Endocrinology Provider    The following health maintenance items are reviewed in Epic and correct as of today:  Health Maintenance   Topic Date Due    RSV VACCINE (Pregnancy & 60+) (1 - 1-dose 60+ series) Never done    TSH W/FREE T4 REFLEX  2024    LIPID  2025    MEDICARE ANNUAL WELLNESS VISIT  2025    ANNUAL REVIEW OF HM ORDERS  2025    FALL RISK ASSESSMENT  2025    DTAP/TDAP/TD IMMUNIZATION (2 - Td or Tdap) 10/15/2025    GLUCOSE  2027    ADVANCE CARE PLANNING  2028    DEXA  2038    PHQ-2 (once per calendar year)  Completed    INFLUENZA  "VACCINE  Completed    Pneumococcal Vaccine: 65+ Years  Completed    ZOSTER IMMUNIZATION  Completed    COVID-19 Vaccine  Completed    IPV IMMUNIZATION  Aged Out    HPV IMMUNIZATION  Aged Out    MENINGITIS IMMUNIZATION  Aged Out    RSV MONOCLONAL ANTIBODY  Aged Out    COLORECTAL CANCER SCREENING  Discontinued         Review of Systems  Constitutional, neuro, ENT, endocrine, pulmonary, cardiac, gastrointestinal, genitourinary, musculoskeletal, integument and psychiatric systems are negative, except as otherwise noted.     Objective    Exam  /77   Pulse 78   Resp 18   Ht 1.676 m (5' 6\")   Wt 83.4 kg (183 lb 12.8 oz)   SpO2 98%   BMI 29.67 kg/m     Estimated body mass index is 29.67 kg/m  as calculated from the following:    Height as of this encounter: 1.676 m (5' 6\").    Weight as of this encounter: 83.4 kg (183 lb 12.8 oz).    Physical Exam  GENERAL: alert and no distress  NECK: no adenopathy, no asymmetry, masses, or scars  RESP: lungs clear to auscultation - no rales, rhonchi or wheezes  CV: regular rate and rhythm, normal S1 S2, no S3 or S4, no murmur, click or rub, no peripheral edema  ABDOMEN: soft, nontender, no hepatosplenomegaly, no masses and bowel sounds normal  MS: no gross musculoskeletal defects noted, no edema        4/11/2024   Mini Cog   Clock Draw Score 2 Normal   3 Item Recall 3 objects recalled   Mini Cog Total Score 5              Signed Electronically by: Kimmie Cain MD    "

## 2024-05-14 DIAGNOSIS — E78.2 MIXED HYPERLIPIDEMIA: ICD-10-CM

## 2024-05-14 RX ORDER — PRAVASTATIN SODIUM 20 MG
20 TABLET ORAL DAILY
Qty: 90 TABLET | Refills: 3 | Status: SHIPPED | OUTPATIENT
Start: 2024-05-14

## 2024-07-22 DIAGNOSIS — I10 ESSENTIAL HYPERTENSION: ICD-10-CM

## 2024-07-22 RX ORDER — LISINOPRIL 20 MG/1
20 TABLET ORAL DAILY
Qty: 90 TABLET | Refills: 1 | Status: SHIPPED | OUTPATIENT
Start: 2024-07-22

## 2024-08-13 ENCOUNTER — LAB (OUTPATIENT)
Dept: LAB | Facility: CLINIC | Age: 79
End: 2024-08-13
Payer: COMMERCIAL

## 2024-08-13 DIAGNOSIS — E21.0 PRIMARY HYPERPARATHYROIDISM (H): ICD-10-CM

## 2024-08-13 DIAGNOSIS — M85.80 OSTEOPENIA, UNSPECIFIED LOCATION: ICD-10-CM

## 2024-08-13 DIAGNOSIS — E83.52 HYPERCALCEMIA: ICD-10-CM

## 2024-08-13 DIAGNOSIS — E03.9 HYPOTHYROIDISM, UNSPECIFIED TYPE: ICD-10-CM

## 2024-08-13 LAB
ALBUMIN SERPL BCG-MCNC: 4.2 G/DL (ref 3.5–5.2)
ANION GAP SERPL CALCULATED.3IONS-SCNC: 9 MMOL/L (ref 7–15)
BUN SERPL-MCNC: 12 MG/DL (ref 8–23)
CALCIUM SERPL-MCNC: 10.5 MG/DL (ref 8.8–10.4)
CHLORIDE SERPL-SCNC: 105 MMOL/L (ref 98–107)
CREAT SERPL-MCNC: 0.76 MG/DL (ref 0.51–0.95)
EGFRCR SERPLBLD CKD-EPI 2021: 79 ML/MIN/1.73M2
GLUCOSE SERPL-MCNC: 96 MG/DL (ref 70–99)
HCO3 SERPL-SCNC: 27 MMOL/L (ref 22–29)
POTASSIUM SERPL-SCNC: 5 MMOL/L (ref 3.4–5.3)
SODIUM SERPL-SCNC: 141 MMOL/L (ref 135–145)
TSH SERPL DL<=0.005 MIU/L-ACNC: 3.96 UIU/ML (ref 0.3–4.2)
VIT D+METAB SERPL-MCNC: 34 NG/ML (ref 20–50)

## 2024-08-13 PROCEDURE — 82306 VITAMIN D 25 HYDROXY: CPT

## 2024-08-13 PROCEDURE — 36415 COLL VENOUS BLD VENIPUNCTURE: CPT

## 2024-08-13 PROCEDURE — 80048 BASIC METABOLIC PNL TOTAL CA: CPT

## 2024-08-13 PROCEDURE — 84443 ASSAY THYROID STIM HORMONE: CPT

## 2024-08-13 PROCEDURE — 82040 ASSAY OF SERUM ALBUMIN: CPT

## 2024-08-21 ENCOUNTER — OFFICE VISIT (OUTPATIENT)
Dept: ENDOCRINOLOGY | Facility: CLINIC | Age: 79
End: 2024-08-21
Payer: COMMERCIAL

## 2024-08-21 VITALS
HEART RATE: 83 BPM | SYSTOLIC BLOOD PRESSURE: 157 MMHG | DIASTOLIC BLOOD PRESSURE: 81 MMHG | OXYGEN SATURATION: 97 % | BODY MASS INDEX: 28.99 KG/M2 | WEIGHT: 179.6 LBS

## 2024-08-21 DIAGNOSIS — E21.0 PRIMARY HYPERPARATHYROIDISM (H): Primary | ICD-10-CM

## 2024-08-21 DIAGNOSIS — E03.9 HYPOTHYROIDISM, UNSPECIFIED TYPE: ICD-10-CM

## 2024-08-21 DIAGNOSIS — E83.52 HYPERCALCEMIA: ICD-10-CM

## 2024-08-21 PROCEDURE — 99214 OFFICE O/P EST MOD 30 MIN: CPT | Performed by: INTERNAL MEDICINE

## 2024-08-21 NOTE — PROGRESS NOTES
ENDOCRINOLOGY FOLLOW-UP    HISTORY OF PRESENT ILLNESS    Kat Diego is seen in follow-up.    1.  Primary hyperparathyroidism.      Having about 2 calcium rich foods each day: Drinks oat milk every day, also has some cheese.    On vitamin D3 1000 IU daily.    2.  Osteopenia.  No falls or fractures since last visit.    Calcium and vitamin D intake as above.    Sees dentist regularly: Not anticipating invasive oral surgery.    We discussed treatment options and I reviewed oral bisphosphonate at our last visit in 12/2023: Ms. Diego has given this more thought but has reservations about oral bisphosphonates (primarily because they would delay her other medications, breakfast and coffee).  She is open to IV bisphosphonate but would like to reevaluate BMD before making that decision.    3.  Hypothyroidism.  We increased levothyroxine to 75 mcg daily in 1/2023 and Ms. Diego continues on this dose.    Energy is excellent.    Pertinent endocrine and related history:  1. Hypercalcemia.  Noted as far back as 2016. Also later found to have elevated PTH.   -On hydrochlorothiazide for hypertension.   -No history of nephrolithiasis.  -Family history notable with sister with hypercalcemia, not certain of the cause.    -We discontinued hydrochlorothiazide with the guidance of her PCP in 10/2022; hypercalcemia persisted  2. Low bone density/osteopenia.  -No fracture history.  -Last DXA scan in 09/2022.  L2-L4 T score 1.1.  Discrepant BMD at L1.  Nonetheless, and interpretation L1-L4 was compared to prior study and showed 1.2% decline in BMD compared to 6/7/2019.  Left femoral neck T score -1.2, left total hip T score -0.6.  1.2% increase in BMD compared to 6/7/2019.  Right femoral neck T score -0.8, right total hip T score -0.7.  0.1% increase in BMD compared to 6/7/2019.  3. Hypothyroidism.    Pertinent Social History: Patient is . She has one son and step children with whom she is close with. 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 Patient has an active social life and enjoys her [employer] fitness class, golfing, having lunch with friends, and watching movies.     PAST MEDICAL HISTORY  Past Medical History:   Diagnosis Date    Allergic rhinitis     Atopic dermatitis     Disease of thyroid gland     hypo    Diverticul disease small and large intestine, no perforati or abscess     Rosacea        MEDICATIONS  Current Outpatient Medications   Medication Sig Dispense Refill    co-enzyme Q-10 100 MG CAPS capsule Take 100 mg by mouth      levothyroxine (SYNTHROID/LEVOTHROID) 75 MCG tablet Take 1 tablet (75 mcg) by mouth daily 90 tablet 3    lisinopril (ZESTRIL) 20 MG tablet Take 1 tablet (20 mg) by mouth daily 90 tablet 1    niacin (SLO-NIACIN) 500 MG CR tablet Take 500 mg by mouth      Omega-3 1000 MG capsule Take 2 g by mouth      pravastatin (PRAVACHOL) 20 MG tablet Take 1 tablet (20 mg) by mouth daily 90 tablet 3    vitamin (B COMPLEX) capsule Take 1 capsule by mouth every other day       triamcinolone (KENALOG) 0.1 % external ointment Apply topically 2 times daily (Patient not taking: Reported on 8/21/2024) 30 g 1       Allergies, family, and social history were reviewed and documented as needed in EHR.     REVIEW OF SYSTEMS  A focused ROS was performed, with pertinent positives and negatives as noted in the HPI.    PHYSICAL EXAM  BP (!) 157/81   Pulse 83   Wt 81.5 kg (179 lb 9.6 oz)   SpO2 97%   BMI 28.99 kg/m    Body mass index is 28.99 kg/m .  Constitutional: Vital signs reviewed, as recorded above. Patient is alert, oriented and appears in no acute distress.  Eyes: PER, EOMI, no stare, lid lag, or retraction; no conjunctival injection.  Respiratory: Normal chest wall motion and respiratory effort.  MSK: No clubbing or cyanosis; normal muscle bulk and tone.  Skin: No lesions on visible skin,  Neurological: Alert and oriented times 3. No tremor.    DATA REVIEW  Each of the following laboratory and/or  imaging studies were reviewed.            ASSESSMENT  1.  Primary hyperparathyroidism.  Hypercalcemia in the face of inappropriately elevated PTH and 24 urine calcium excretion of 270 mg per 24 hours: Consistent with primary hyperparathyroidism.  No osteoporosis, mild hypercalcemia, no history of kidney stones and no history of kidney disease.  Therefore, would defer surgical intervention, especially since she would be higher risk given her age.  She is open to surgical intervention if necessary.    2.  Low bone density.  No occult fracture on spine x-rays.  Given high calculated FRAX score (1 risk factor is mother's history of hip fracture), we discussed pharmacologic therapy at last visit.  Ms. Diego wanted to give this more thought and notes today that she is less inclined to pursue oral bisphosphonates given their very specific administration requirement; however, she is open to IV bisphosphonate but not yet ready to start treatment.  She would be interested in assessing follow-up BMD as it would help with her decision making.  We will therefore follow-up in 5/2025, with DXA scan and labs prior to visit.  Meanwhile, continue moderate dietary calcium intake.  Previsit labs show optimal vitamin D: Continue supplement without changes.    3. Hypothyroidism.  Clinically and biochemically euthyroid, with normal TSH on previsit labs.  Continue levothyroxine without changes.    PLAN  -Continue to aim for 2 calcium-rich foods each day  -Continue vitamin D3 1000 IU daily  -Continue levothyroxine to 75 mcg daily  -Keep well hydrated  -Follow-up end of May 2025, with labs and DXA scan before visit (DXA should be done on the same scanner as prior study in [place])  -We will communicate results via [employer], or if needed by phone      Orders Placed This Encounter   Procedures    DX Bone Density    Vitamin D Deficiency    Comprehensive metabolic panel    Parathyroid Hormone Intact    TSH with free T4 reflex       Nolawit  MD Edy   Division of Diabetes, Endocrinology and Metabolism  Department of Medicine

## 2024-08-21 NOTE — PATIENT INSTRUCTIONS
-Continue to aim for 2 calcium-rich foods each day  -Continue vitamin D3 1000 IU daily  -Continue levothyroxine to 75 mcg daily  -Keep well hydrated  -Follow-up end of May 2025, with labs and DXA scan before visit (DXA should be done on the same scanner as prior study in [place])  -We will communicate results via [employer], or if needed by phone

## 2024-08-21 NOTE — LETTER
8/21/2024      Kat Diego  [address]  [facility] [address]      Dear Colleague,    Thank you for referring your patient, Kat Diego, to the [facility]. Please see a copy of my visit note below.      ENDOCRINOLOGY FOLLOW-UP    HISTORY OF PRESENT ILLNESS    Kat Diego is seen in follow-up.    1.  Primary hyperparathyroidism.      Having about 2 calcium rich foods each day: Drinks oat milk every day, also has some cheese.    On vitamin D3 1000 IU daily.    2.  Osteopenia.  No falls or fractures since last visit.    Calcium and vitamin D intake as above.    Sees dentist regularly: Not anticipating invasive oral surgery.    We discussed treatment options and I reviewed oral bisphosphonate at our last visit in 12/2023: Ms. Diego has given this more thought but has reservations about oral bisphosphonates (primarily because they would delay her other medications, breakfast and coffee).  She is open to IV bisphosphonate but would like to reevaluate BMD before making that decision.    3.  Hypothyroidism.  We increased levothyroxine to 75 mcg daily in 1/2023 and Ms. Diego continues on this dose.    Energy is excellent.    Pertinent endocrine and related history:  1. Hypercalcemia.  Noted as far back as 2016. Also later found to have elevated PTH.   -On hydrochlorothiazide for hypertension.   -No history of nephrolithiasis.  -Family history notable with sister with hypercalcemia, not certain of the cause.    -We discontinued hydrochlorothiazide with the guidance of her PCP in 10/2022; hypercalcemia persisted  2. Low bone density/osteopenia.  -No fracture history.  -Last DXA scan in 09/2022.  L2-L4 T score 1.1.  Discrepant BMD at L1.  Nonetheless, and interpretation L1-L4 was compared to prior study and showed 1.2% decline in BMD compared to 6/7/2019.  Left femoral neck T score -1.2, left total hip T score -0.6.  1.2% increase in BMD compared to 6/7/2019.  Right  femoral neck T score -0.8, right total hip T score -0.7.  0.1% increase in BMD compared to 6/7/2019.  3. Hypothyroidism.    Pertinent Social History: Patient is . She has one son and step children with whom she is close with. 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 Patient has an active social life and enjoys her [employer] fitness class, golfing, having lunch with friends, and watching movies.     PAST MEDICAL HISTORY  Past Medical History:   Diagnosis Date     Allergic rhinitis      Atopic dermatitis      Disease of thyroid gland     hypo     Diverticul disease small and large intestine, no perforati or abscess      Rosacea        MEDICATIONS  Current Outpatient Medications   Medication Sig Dispense Refill     co-enzyme Q-10 100 MG CAPS capsule Take 100 mg by mouth       levothyroxine (SYNTHROID/LEVOTHROID) 75 MCG tablet Take 1 tablet (75 mcg) by mouth daily 90 tablet 3     lisinopril (ZESTRIL) 20 MG tablet Take 1 tablet (20 mg) by mouth daily 90 tablet 1     niacin (SLO-NIACIN) 500 MG CR tablet Take 500 mg by mouth       Omega-3 1000 MG capsule Take 2 g by mouth       pravastatin (PRAVACHOL) 20 MG tablet Take 1 tablet (20 mg) by mouth daily 90 tablet 3     vitamin (B COMPLEX) capsule Take 1 capsule by mouth every other day        triamcinolone (KENALOG) 0.1 % external ointment Apply topically 2 times daily (Patient not taking: Reported on 8/21/2024) 30 g 1       Allergies, family, and social history were reviewed and documented as needed in EHR.     REVIEW OF SYSTEMS  A focused ROS was performed, with pertinent positives and negatives as noted in the HPI.    PHYSICAL EXAM  BP (!) 157/81   Pulse 83   Wt 81.5 kg (179 lb 9.6 oz)   SpO2 97%   BMI 28.99 kg/m    Body mass index is 28.99 kg/m .  Constitutional: Vital signs reviewed, as recorded above. Patient is alert, oriented and appears in no acute distress.  Eyes: PER, EOMI, no stare, lid lag, or retraction; no conjunctival  injection.  Respiratory: Normal chest wall motion and respiratory effort.  MSK: No clubbing or cyanosis; normal muscle bulk and tone.  Skin: No lesions on visible skin,  Neurological: Alert and oriented times 3. No tremor.    DATA REVIEW  Each of the following laboratory and/or imaging studies were reviewed.            ASSESSMENT  1.  Primary hyperparathyroidism.  Hypercalcemia in the face of inappropriately elevated PTH and 24 urine calcium excretion of 270 mg per 24 hours: Consistent with primary hyperparathyroidism.  No osteoporosis, mild hypercalcemia, no history of kidney stones and no history of kidney disease.  Therefore, would defer surgical intervention, especially since she would be higher risk given her age.  She is open to surgical intervention if necessary.    2.  Low bone density.  No occult fracture on spine x-rays.  Given high calculated FRAX score (1 risk factor is mother's history of hip fracture), we discussed pharmacologic therapy at last visit.  Ms. Diego wanted to give this more thought and notes today that she is less inclined to pursue oral bisphosphonates given their very specific administration requirement; however, she is open to IV bisphosphonate but not yet ready to start treatment.  She would be interested in assessing follow-up BMD as it would help with her decision making.  We will therefore follow-up in 5/2025, with DXA scan and labs prior to visit.  Meanwhile, continue moderate dietary calcium intake.  Previsit labs show optimal vitamin D: Continue supplement without changes.    3. Hypothyroidism.  Clinically and biochemically euthyroid, with normal TSH on previsit labs.  Continue levothyroxine without changes.    PLAN  -Continue to aim for 2 calcium-rich foods each day  -Continue vitamin D3 1000 IU daily  -Continue levothyroxine to 75 mcg daily  -Keep well hydrated  -Follow-up end of May 2025, with labs and DXA scan before visit (DXA should be done on the same scanner as prior  study in [place])  -We will communicate results via [employer], or if needed by phone      Orders Placed This Encounter   Procedures     DX Bone Density     Vitamin D Deficiency     Comprehensive metabolic panel     Parathyroid Hormone Intact     TSH with free T4 reflex       Jorge Snow MD   Division of Diabetes, Endocrinology and Metabolism  Department of Medicine      Again, thank you for allowing me to participate in the care of your patient.        Sincerely,        Jorge Snow MD

## 2024-09-09 ENCOUNTER — TRANSFERRED RECORDS (OUTPATIENT)
Dept: HEALTH INFORMATION MANAGEMENT | Facility: CLINIC | Age: 79
End: 2024-09-09
Payer: COMMERCIAL

## 2025-01-15 DIAGNOSIS — I10 ESSENTIAL HYPERTENSION: ICD-10-CM

## 2025-01-15 RX ORDER — LISINOPRIL 20 MG/1
20 TABLET ORAL DAILY
Qty: 90 TABLET | Refills: 1 | Status: SHIPPED | OUTPATIENT
Start: 2025-01-15

## 2025-03-10 DIAGNOSIS — E03.9 HYPOTHYROIDISM, UNSPECIFIED TYPE: ICD-10-CM

## 2025-03-10 DIAGNOSIS — M85.80 OSTEOPENIA, UNSPECIFIED LOCATION: ICD-10-CM

## 2025-03-10 DIAGNOSIS — E83.52 HYPERCALCEMIA: ICD-10-CM

## 2025-03-10 DIAGNOSIS — E21.0 PRIMARY HYPERPARATHYROIDISM: ICD-10-CM

## 2025-03-11 RX ORDER — LEVOTHYROXINE SODIUM 75 UG/1
75 TABLET ORAL DAILY
Qty: 90 TABLET | Refills: 0 | Status: SHIPPED | OUTPATIENT
Start: 2025-03-11

## 2025-04-07 ENCOUNTER — ANCILLARY ORDERS (OUTPATIENT)
Dept: FAMILY MEDICINE | Facility: CLINIC | Age: 80
End: 2025-04-07
Payer: COMMERCIAL

## 2025-04-07 ENCOUNTER — ANCILLARY ORDERS (OUTPATIENT)
Dept: MAMMOGRAPHY | Facility: CLINIC | Age: 80
End: 2025-04-07
Payer: COMMERCIAL

## 2025-04-07 DIAGNOSIS — Z12.31 OTHER SCREENING MAMMOGRAM: Primary | ICD-10-CM

## 2025-04-09 ENCOUNTER — ANCILLARY PROCEDURE (OUTPATIENT)
Dept: MAMMOGRAPHY | Facility: CLINIC | Age: 80
End: 2025-04-09
Attending: FAMILY MEDICINE
Payer: COMMERCIAL

## 2025-04-09 ENCOUNTER — LAB (OUTPATIENT)
Dept: LAB | Facility: CLINIC | Age: 80
End: 2025-04-09
Payer: COMMERCIAL

## 2025-04-09 DIAGNOSIS — E78.2 MIXED HYPERLIPIDEMIA: ICD-10-CM

## 2025-04-09 DIAGNOSIS — I10 ESSENTIAL HYPERTENSION: ICD-10-CM

## 2025-04-09 DIAGNOSIS — Z12.31 OTHER SCREENING MAMMOGRAM: ICD-10-CM

## 2025-04-09 LAB
HOLD SPECIMEN: NORMAL
HOLD SPECIMEN: NORMAL

## 2025-04-09 PROCEDURE — 85025 COMPLETE CBC W/AUTO DIFF WBC: CPT

## 2025-04-09 PROCEDURE — 36415 COLL VENOUS BLD VENIPUNCTURE: CPT

## 2025-04-09 PROCEDURE — 77063 BREAST TOMOSYNTHESIS BI: CPT

## 2025-04-09 PROCEDURE — 77067 SCR MAMMO BI INCL CAD: CPT

## 2025-04-09 SDOH — HEALTH STABILITY: PHYSICAL HEALTH: ON AVERAGE, HOW MANY MINUTES DO YOU ENGAGE IN EXERCISE AT THIS LEVEL?: 30 MIN

## 2025-04-09 SDOH — HEALTH STABILITY: PHYSICAL HEALTH: ON AVERAGE, HOW MANY DAYS PER WEEK DO YOU ENGAGE IN MODERATE TO STRENUOUS EXERCISE (LIKE A BRISK WALK)?: 3 DAYS

## 2025-04-09 ASSESSMENT — SOCIAL DETERMINANTS OF HEALTH (SDOH): HOW OFTEN DO YOU GET TOGETHER WITH FRIENDS OR RELATIVES?: MORE THAN THREE TIMES A WEEK

## 2025-04-10 DIAGNOSIS — I10 ESSENTIAL HYPERTENSION: ICD-10-CM

## 2025-04-10 DIAGNOSIS — E78.2 MIXED HYPERLIPIDEMIA: Primary | ICD-10-CM

## 2025-04-10 LAB
BASOPHILS # BLD AUTO: 0.1 10E3/UL (ref 0–0.2)
BASOPHILS NFR BLD AUTO: 1 %
EOSINOPHIL # BLD AUTO: 0.2 10E3/UL (ref 0–0.7)
EOSINOPHIL NFR BLD AUTO: 4 %
ERYTHROCYTE [DISTWIDTH] IN BLOOD BY AUTOMATED COUNT: 13.6 % (ref 10–15)
HCT VFR BLD AUTO: 42.7 % (ref 35–47)
HGB BLD-MCNC: 14 G/DL (ref 11.7–15.7)
IMM GRANULOCYTES # BLD: 0 10E3/UL
IMM GRANULOCYTES NFR BLD: 0 %
LYMPHOCYTES # BLD AUTO: 1.2 10E3/UL (ref 0.8–5.3)
LYMPHOCYTES NFR BLD AUTO: 25 %
MCH RBC QN AUTO: 29.7 PG (ref 26.5–33)
MCHC RBC AUTO-ENTMCNC: 32.8 G/DL (ref 31.5–36.5)
MCV RBC AUTO: 91 FL (ref 78–100)
MONOCYTES # BLD AUTO: 0.3 10E3/UL (ref 0–1.3)
MONOCYTES NFR BLD AUTO: 7 %
NEUTROPHILS # BLD AUTO: 2.9 10E3/UL (ref 1.6–8.3)
NEUTROPHILS NFR BLD AUTO: 62 %
PLATELET # BLD AUTO: 190 10E3/UL (ref 150–450)
RBC # BLD AUTO: 4.72 10E6/UL (ref 3.8–5.2)
WBC # BLD AUTO: 4.7 10E3/UL (ref 4–11)

## 2025-04-14 ENCOUNTER — OFFICE VISIT (OUTPATIENT)
Dept: FAMILY MEDICINE | Facility: CLINIC | Age: 80
End: 2025-04-14
Payer: COMMERCIAL

## 2025-04-14 ENCOUNTER — DOCUMENTATION ONLY (OUTPATIENT)
Dept: FAMILY MEDICINE | Facility: CLINIC | Age: 80
End: 2025-04-14

## 2025-04-14 VITALS
HEIGHT: 66 IN | BODY MASS INDEX: 28.9 KG/M2 | DIASTOLIC BLOOD PRESSURE: 84 MMHG | RESPIRATION RATE: 16 BRPM | SYSTOLIC BLOOD PRESSURE: 142 MMHG | TEMPERATURE: 98.3 F | HEART RATE: 77 BPM | OXYGEN SATURATION: 98 % | WEIGHT: 179.8 LBS

## 2025-04-14 DIAGNOSIS — J33.9 NASAL POLYP: ICD-10-CM

## 2025-04-14 DIAGNOSIS — I10 ESSENTIAL HYPERTENSION: ICD-10-CM

## 2025-04-14 DIAGNOSIS — E78.2 MIXED HYPERLIPIDEMIA: Primary | ICD-10-CM

## 2025-04-14 DIAGNOSIS — E78.2 MIXED HYPERLIPIDEMIA: ICD-10-CM

## 2025-04-14 DIAGNOSIS — E83.52 HYPERCALCEMIA: ICD-10-CM

## 2025-04-14 DIAGNOSIS — Z00.00 ENCOUNTER FOR MEDICARE ANNUAL WELLNESS EXAM: Primary | ICD-10-CM

## 2025-04-14 PROCEDURE — 3079F DIAST BP 80-89 MM HG: CPT | Performed by: FAMILY MEDICINE

## 2025-04-14 PROCEDURE — 99214 OFFICE O/P EST MOD 30 MIN: CPT | Mod: 25 | Performed by: FAMILY MEDICINE

## 2025-04-14 PROCEDURE — 91320 SARSCV2 VAC 30MCG TRS-SUC IM: CPT | Performed by: FAMILY MEDICINE

## 2025-04-14 PROCEDURE — 90480 ADMN SARSCOV2 VAC 1/ONLY CMP: CPT | Performed by: FAMILY MEDICINE

## 2025-04-14 PROCEDURE — G2211 COMPLEX E/M VISIT ADD ON: HCPCS | Performed by: FAMILY MEDICINE

## 2025-04-14 PROCEDURE — G0439 PPPS, SUBSEQ VISIT: HCPCS | Performed by: FAMILY MEDICINE

## 2025-04-14 PROCEDURE — 3077F SYST BP >= 140 MM HG: CPT | Performed by: FAMILY MEDICINE

## 2025-04-14 NOTE — PATIENT INSTRUCTIONS
Patient Education   Preventive Care Advice   This is general advice given by our system to help you stay healthy. However, your care team may have specific advice just for you. Please talk to your care team about your preventive care needs.  Nutrition  Eat 5 or more servings of fruits and vegetables each day.  Try wheat bread, brown rice and whole grain pasta (instead of white bread, rice, and pasta).  Get enough calcium and vitamin D. Check the label on foods and aim for 100% of the RDA (recommended daily allowance).  Lifestyle  Exercise at least 150 minutes each week  (30 minutes a day, 5 days a week).  Do muscle strengthening activities 2 days a week. These help control your weight and prevent disease.  No smoking.  Wear sunscreen to prevent skin cancer.  Have a dental exam and cleaning every 6 months.  Yearly exams  See your health care team every year to talk about:  Any changes in your health.  Any medicines your care team has prescribed.  Preventive care, family planning, and ways to prevent chronic diseases.  Shots (vaccines)   HPV shots (up to age 26), if you've never had them before.  Hepatitis B shots (up to age 59), if you've never had them before.  COVID-19 shot: Get this shot when it's due.  Flu shot: Get a flu shot every year.  Tetanus shot: Get a tetanus shot every 10 years.  Pneumococcal, hepatitis A, and RSV shots: Ask your care team if you need these based on your risk.  Shingles shot (for age 50 and up)  General health tests  Diabetes screening:  Starting at age 35, Get screened for diabetes at least every 3 years.  If you are younger than age 35, ask your care team if you should be screened for diabetes.  Cholesterol test: At age 39, start having a cholesterol test every 5 years, or more often if advised.  Bone density scan (DEXA): At age 50, ask your care team if you should have this scan for osteoporosis (brittle bones).  Hepatitis C: Get tested at least once in your life.  STIs (sexually  transmitted infections)  Before age 24: Ask your care team if you should be screened for STIs.  After age 24: Get screened for STIs if you're at risk. You are at risk for STIs (including HIV) if:  You are sexually active with more than one person.  You don't use condoms every time.  You or a partner was diagnosed with a sexually transmitted infection.  If you are at risk for HIV, ask about PrEP medicine to prevent HIV.  Get tested for HIV at least once in your life, whether you are at risk for HIV or not.  Cancer screening tests  Cervical cancer screening: If you have a cervix, begin getting regular cervical cancer screening tests starting at age 21.  Breast cancer scan (mammogram): If you've ever had breasts, begin having regular mammograms starting at age 40. This is a scan to check for breast cancer.  Colon cancer screening: It is important to start screening for colon cancer at age 45.  Have a colonoscopy test every 10 years (or more often if you're at risk) Or, ask your provider about stool tests like a FIT test every year or Cologuard test every 3 years.  To learn more about your testing options, visit:   .  For help making a decision, visit:   https://bit.ly/qm22886.  Prostate cancer screening test: If you have a prostate, ask your care team if a prostate cancer screening test (PSA) at age 55 is right for you.  Lung cancer screening: If you are a current or former smoker ages 50 to 80, ask your care team if ongoing lung cancer screenings are right for you.  For informational purposes only. Not to replace the advice of your health care provider. Copyright   2023 [employer]. All rights reserved. Clinically reviewed by the [facility] Transitions Program. [facility] [id number] - REV 01/24.

## 2025-04-14 NOTE — PROGRESS NOTES
Preventive Care Visit  [facility]  Kimmie Cain MD, Family Medicine  Apr 14, 2025      Assessment & Plan     ICD-10-CM    1. Encounter for Medicare annual wellness exam  Z00.00       2. Hypercalcemia  E83.52       3. Essential hypertension  I10       4. Mixed hyperlipidemia  E78.2 Lipoprotein (a)     Hepatic panel (Albumin, ALT, AST, Bili, Alk Phos, TP)      5. Nasal polyp  J33.9 Adult ENT  Referral        Patient presents today for annual wellness exam.  Her blood pressure has been borderline elevated recently.  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  We addressed the following issues today  1: Essential hypertension: Initial blood pressure was fairly high.  On visual imagery and repeat check, it does come down significantly.  We discussed the option of adding amlodipine low-dose if blood pressures persistently high.  At this time the decision was deferred.  2: Hyperlipidemia: Patient on pravastatin 20 mg.  Cholesterol LDL has been less than 100 consistently.  Patient is interested in checking for lipoprotein a.  There is a family history of stroke in mom.  We will proceed with this.  Discussed that she has an option to increase the pravastatin to 40 mg.  She verbalizes understanding  3: Nasal polyp: Referral to ENT for further management.  She does use Nasacort but has been using it mostly for the left nostril and the polyp is in the right nostril.  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  Patient being monitored with endocrinology.  6: Hypothyroidism: Stable with recent increase of levothyroxine.  Patient is tolerating it well.  Patient otherwise feels well.  Continues to be fairly active and denies any acute concerns.    The longitudinal plan of care for the diagnosis(es)/condition(s) as documented were addressed during this visit. Due to the added complexity in care, I will continue to support Honey in the subsequent  "management and with ongoing continuity of care.          BMI  Estimated body mass index is 29.02 kg/m  as calculated from the following:    Height as of this encounter: 1.676 m (5' 6\").    Weight as of this encounter: 81.6 kg (179 lb 12.8 oz).       Counseling  Appropriate preventive services were addressed with this patient via screening, questionnaire, or discussion as appropriate for fall prevention, nutrition, physical activity, Tobacco-use cessation, social engagement, weight loss and cognition.  Checklist reviewing preventive services available has been given to the patient.  Reviewed patient's diet, addressing concerns and/or questions.   She is at risk for lack of exercise and has been provided with information to increase physical activity for the benefit of her well-being.       MEDICATIONS:  Continue current medications without change  Regular exercise  See Patient Instructions    Subjective   Honey is a 79 year old, presenting for the following:  Wellness Visit (Pt is NOT fasting today, had blood work done last week. Sister had lipo done with blood work- kind of curious about getting this done? Pt has a polyp in her right sinus per her dentist- can she go to any ENT? Talk about blood pressure again.)        4/14/2025     9:12 AM   Additional Questions   Roomed by Meena Veronica CMA           HPI    Advance Care Planning  Patient has a Health Care Directive on file  Advance care planning document is on file and is current.      4/9/2025   General Health   How would you rate your overall physical health? Excellent   Feel stress (tense, anxious, or unable to sleep) Not at all         4/9/2025   Nutrition   Diet: Regular (no restrictions)         4/9/2025   Exercise   Days per week of moderate/strenous exercise 3 days   Average minutes spent exercising at this level 30 min         4/9/2025   Social Factors   Frequency of gathering with friends or relatives More than three times a week   Worry food won't last " until get money to buy more No   Food not last or not have enough money for food? No   Do you have housing? (Housing is defined as stable permanent housing and does not include staying ouside in a car, in a tent, in an abandoned building, in an overnight shelter, or couch-surfing.) Yes   Are you worried about losing your housing? No   Lack of transportation? No   Unable to get utilities (heat,electricity)? No         4/13/2025   Fall Risk   Fallen 2 or more times in the past year? No   Trouble with walking or balance? No          4/9/2025   Activities of Daily Living- Home Safety   Needs help with the following daily activites None of the above   Safety concerns in the home None of the above         4/9/2025   Dental   Dentist two times every year? Yes         4/9/2025   Hearing Screening   Hearing concerns? None of the above         4/9/2025   Driving Risk Screening   Patient/family members have concerns about driving No         4/9/2025   General Alertness/Fatigue Screening   Have you been more tired than usual lately? No         4/9/2025   Urinary Incontinence Screening   Bothered by leaking urine in past 6 months No           4/4/2024   TB Screening   Were you born outside of the [place]? No           Today's PHQ-2 Score:       4/13/2025     9:07 PM   PHQ-2 ( 1999 [employer])   Q1: Little interest or pleasure in doing things 0   Q2: Feeling down, depressed or hopeless 0   PHQ-2 Score 0    Q1: Little interest or pleasure in doing things Not at all   Q2: Feeling down, depressed or hopeless Not at all   PHQ-2 Score 0       Patient-reported           4/9/2025   Substance Use   Alcohol more than 3/day or more than 7/wk No   Do you have a current opioid prescription? No   How severe/bad is pain from 1 to 10? 2/10   Do you use any other substances recreationally? (!) OTHER     Social History     Tobacco Use    Smoking status: Never    Smokeless tobacco: Never   Substance Use Topics    Alcohol use: Yes     Comment: Alcoholic  Drinks/day: rarely    Drug use: No           4/9/2025   LAST FHS-7 RESULTS   1st degree relative breast or ovarian cancer No   Any relative bilateral breast cancer No   Any male have breast cancer No        Mammogram Screening - After age 74- determine frequency with patient based on health status, life expectancy and patient goals              Reviewed and updated as needed this visit by Provider   Tobacco  Allergies  Meds  Problems  Med Hx  Surg Hx  Fam Hx            Past Medical History:   Diagnosis Date    Allergic rhinitis     Atopic dermatitis     Disease of thyroid gland     hypo    Diverticul disease small and large intestine, no perforati or abscess     Rosacea      Past Surgical History:   Procedure Laterality Date    DENTAL SURGERY      DILATION AND CURETTAGE      TOTAL KNEE ARTHROPLASTY Left 11/25/2015    [facility]; Rolf Helton MD    WISDOM TOOTH EXTRACTION      [facility] TOTAL KNEE ARTHROPLASTY Left 11/25/2015    Procedure: KNEE TOTAL ARTHROPLASTY, LEFT;  Surgeon: Rolf Helton MD;  Location: [facility];  Service: Orthopedics    [facility] TOTAL KNEE ARTHROPLASTY Right 9/23/2016    Procedure: RIGHT KNEE TOTAL ARTHROPLASTY;  Surgeon: Rolf Helton MD;  Location: [facility];  Service: Orthopedics     BP Readings from Last 3 Encounters:   04/14/25 (!) 142/84   08/21/24 (!) 157/81   04/11/24 139/77    Wt Readings from Last 3 Encounters:   04/14/25 81.6 kg (179 lb 12.8 oz)   08/21/24 81.5 kg (179 lb 9.6 oz)   04/11/24 83.4 kg (183 lb 12.8 oz)                  Patient Active Problem List   Diagnosis    Hypercalcemia    Colon polyps    Essential hypertension    History of left knee replacement    Hypothyroidism    Knee osteoarthritis    Benign neoplasm of sigmoid colon    Diverticular disease of large intestine    History of total right knee replacement    Mixed hyperlipidemia    Primary hyperparathyroidism    Benign neoplasm of cecum     Past Surgical History:   Procedure  Laterality Date    DENTAL SURGERY      DILATION AND CURETTAGE      TOTAL KNEE ARTHROPLASTY Left 2015    [facility]; Rolf Helton MD    WISDOM TOOTH EXTRACTION      [facility] TOTAL KNEE ARTHROPLASTY Left 2015    Procedure: KNEE TOTAL ARTHROPLASTY, LEFT;  Surgeon: Rolf Helton MD;  Location: [facility];  Service: Orthopedics    [facility] TOTAL KNEE ARTHROPLASTY Right 2016    Procedure: RIGHT KNEE TOTAL ARTHROPLASTY;  Surgeon: Rolf Helton MD;  Location: [facility];  Service: Orthopedics       Social History     Tobacco Use    Smoking status: Never    Smokeless tobacco: Never   Substance Use Topics    Alcohol use: Yes     Comment: Alcoholic Drinks/day: rarely     Family History   Problem Relation Age of Onset    Heart Disease Mother     Cerebrovascular Disease Mother     Colon Cancer Father     Hyperlipidemia Sister     Hypertension Sister     Heart Disease Brother 60.00        Massive MI,  in sleep         Current Outpatient Medications   Medication Sig Dispense Refill    co-enzyme Q-10 100 MG CAPS capsule Take 100 mg by mouth      levothyroxine (SYNTHROID/LEVOTHROID) 75 MCG tablet Take 1 tablet (75 mcg) by mouth daily. 90 tablet 0    lisinopril (ZESTRIL) 20 MG tablet Take 1 tablet (20 mg) by mouth daily. 90 tablet 1    niacin (SLO-NIACIN) 500 MG CR tablet Take 500 mg by mouth      Omega-3 1000 MG capsule Take 2 g by mouth      pravastatin (PRAVACHOL) 20 MG tablet Take 1 tablet (20 mg) by mouth daily 90 tablet 3    vitamin (B COMPLEX) capsule Take 1 capsule by mouth every other day        Current providers sharing in care for this patient include:  Patient Care Team:  Kimmie Cain MD as PCP - General (Family Medicine)  Kimmie Cain MD as Referring Physician (Family Practice)  Kimmie Cain MD as Assigned PCP  Jorge Snow MD as MD (Endocrinology, Diabetes, and Metabolism)  Jorge Snow MD as Assigned Endocrinology Provider    The following  "health maintenance items are reviewed in Epic and correct as of today:  Health Maintenance   Topic Date Due    BMP  08/13/2025    TSH W/FREE T4 REFLEX  08/13/2025    DTAP/TDAP/TD IMMUNIZATION (2 - Td or Tdap) 10/15/2025    LIPID  04/09/2026    MEDICARE ANNUAL WELLNESS VISIT  04/14/2026    ANNUAL REVIEW OF HM ORDERS  04/14/2026    FALL RISK ASSESSMENT  04/18/2026    ADVANCE CARE PLANNING  04/11/2029    DEXA  05/17/2038    PHQ-2 (once per calendar year)  Completed    INFLUENZA VACCINE  Completed    Pneumococcal Vaccine: 50+ Years  Completed    ZOSTER IMMUNIZATION  Completed    RSV VACCINE  Completed    COVID-19 Vaccine  Completed    HPV IMMUNIZATION  Aged Out    MENINGITIS IMMUNIZATION  Aged Out    COLORECTAL CANCER SCREENING  Discontinued         Review of Systems  Constitutional, HEENT, cardiovascular, pulmonary, GI, , musculoskeletal, neuro, skin, endocrine and psych systems are negative, except as otherwise noted.     Objective    Exam  BP (!) 142/84   Pulse 77   Temp 98.3  F (36.8  C) (Oral)   Resp 16   Ht 1.676 m (5' 6\")   Wt 81.6 kg (179 lb 12.8 oz)   SpO2 98%   BMI 29.02 kg/m     Estimated body mass index is 29.02 kg/m  as calculated from the following:    Height as of this encounter: 1.676 m (5' 6\").    Weight as of this encounter: 81.6 kg (179 lb 12.8 oz).    Physical Exam  GENERAL: alert and no distress  NECK: no adenopathy, no asymmetry, masses, or scars  RESP: lungs clear to auscultation - no rales, rhonchi or wheezes  CV: regular rate and rhythm, normal S1 S2, no S3 or S4, no murmur, click or rub, no peripheral edema  ABDOMEN: soft, nontender, no hepatosplenomegaly, no masses and bowel sounds normal  MS: no gross musculoskeletal defects noted, no edema        4/14/2025   Mini Cog   Clock Draw Score 2 Normal   3 Item Recall 3 objects recalled   Mini Cog Total Score 5              Signed Electronically by: Kimmie Cain MD    "

## 2025-04-14 NOTE — PROGRESS NOTES
The add-on test HEPATIC FUNCTION PANEL  that was requested on 04/14/25  is unable to be completed due to discourded. Future order is available for collection. If labs are needed before next appointment, please arrange for collection.

## 2025-05-19 ENCOUNTER — HOSPITAL ENCOUNTER (OUTPATIENT)
Dept: BONE DENSITY | Facility: HOSPITAL | Age: 80
Discharge: HOME | End: 2025-05-19
Attending: INTERNAL MEDICINE | Admitting: INTERNAL MEDICINE
Payer: COMMERCIAL

## 2025-05-19 DIAGNOSIS — E83.52 HYPERCALCEMIA: ICD-10-CM

## 2025-05-19 DIAGNOSIS — E21.0 PRIMARY HYPERPARATHYROIDISM: ICD-10-CM

## 2025-05-19 PROCEDURE — 77080 DXA BONE DENSITY AXIAL: CPT

## 2025-05-27 ENCOUNTER — RESULTS FOLLOW-UP (OUTPATIENT)
Dept: ENDOCRINOLOGY | Facility: CLINIC | Age: 80
End: 2025-05-27

## 2025-06-04 ENCOUNTER — LAB (OUTPATIENT)
Dept: LAB | Facility: CLINIC | Age: 80
End: 2025-06-04
Payer: COMMERCIAL

## 2025-06-04 DIAGNOSIS — E03.9 HYPOTHYROIDISM, UNSPECIFIED TYPE: ICD-10-CM

## 2025-06-04 DIAGNOSIS — E78.2 MIXED HYPERLIPIDEMIA: ICD-10-CM

## 2025-06-04 DIAGNOSIS — E83.52 HYPERCALCEMIA: ICD-10-CM

## 2025-06-04 DIAGNOSIS — E21.0 PRIMARY HYPERPARATHYROIDISM: ICD-10-CM

## 2025-06-04 LAB
ALBUMIN SERPL BCG-MCNC: 4.2 G/DL (ref 3.5–5.2)
ALP SERPL-CCNC: 62 U/L (ref 40–150)
ALT SERPL W P-5'-P-CCNC: 17 U/L (ref 0–50)
ANION GAP SERPL CALCULATED.3IONS-SCNC: 8 MMOL/L (ref 7–15)
APO A-I SERPL-MCNC: 13 MG/DL
AST SERPL W P-5'-P-CCNC: 30 U/L (ref 0–45)
BILIRUB SERPL-MCNC: 0.6 MG/DL
BILIRUBIN DIRECT (ROCHE PRO & PURE): 0.26 MG/DL (ref 0–0.45)
BUN SERPL-MCNC: 15.6 MG/DL (ref 8–23)
CALCIUM SERPL-MCNC: 10.3 MG/DL (ref 8.8–10.4)
CHLORIDE SERPL-SCNC: 106 MMOL/L (ref 98–107)
CREAT SERPL-MCNC: 0.63 MG/DL (ref 0.51–0.95)
EGFRCR SERPLBLD CKD-EPI 2021: 89 ML/MIN/1.73M2
GLUCOSE SERPL-MCNC: 100 MG/DL (ref 70–99)
HCO3 SERPL-SCNC: 27 MMOL/L (ref 22–29)
POTASSIUM SERPL-SCNC: 4.5 MMOL/L (ref 3.4–5.3)
PROT SERPL-MCNC: 6.8 G/DL (ref 6.4–8.3)
PTH-INTACT SERPL-MCNC: 75 PG/ML (ref 15–65)
SODIUM SERPL-SCNC: 141 MMOL/L (ref 135–145)
TSH SERPL DL<=0.005 MIU/L-ACNC: 4.15 UIU/ML (ref 0.3–4.2)
VIT D+METAB SERPL-MCNC: 38 NG/ML (ref 20–50)

## 2025-06-04 PROCEDURE — 80053 COMPREHEN METABOLIC PANEL: CPT

## 2025-06-04 PROCEDURE — 82248 BILIRUBIN DIRECT: CPT

## 2025-06-04 PROCEDURE — 82306 VITAMIN D 25 HYDROXY: CPT

## 2025-06-04 PROCEDURE — 83970 ASSAY OF PARATHORMONE: CPT

## 2025-06-04 PROCEDURE — 84443 ASSAY THYROID STIM HORMONE: CPT

## 2025-06-04 PROCEDURE — 83695 ASSAY OF LIPOPROTEIN(A): CPT

## 2025-06-04 PROCEDURE — 36415 COLL VENOUS BLD VENIPUNCTURE: CPT

## 2025-06-10 ENCOUNTER — PATIENT OUTREACH (OUTPATIENT)
Dept: FAMILY MEDICINE | Facility: CLINIC | Age: 80
End: 2025-06-10
Payer: COMMERCIAL

## 2025-06-10 NOTE — TELEPHONE ENCOUNTER
Patient Quality Outreach    Patient is due for the following:   Hypertension -  BP check  Physical Annual Wellness Visit    Action(s) Taken:   Called and spoke to patient- states bp is running 138/77    Type of outreach:    Phone, spoke to patient/parent. Patient/parent will call back to schedule.    Questions for provider review:    None         Shanice Fuentes MA  Chart routed to None.

## 2025-06-19 ENCOUNTER — MYC REFILL (OUTPATIENT)
Dept: FAMILY MEDICINE | Facility: CLINIC | Age: 80
End: 2025-06-19
Payer: COMMERCIAL

## 2025-06-19 DIAGNOSIS — E83.52 HYPERCALCEMIA: ICD-10-CM

## 2025-06-19 DIAGNOSIS — E78.2 MIXED HYPERLIPIDEMIA: ICD-10-CM

## 2025-06-19 DIAGNOSIS — E03.9 HYPOTHYROIDISM, UNSPECIFIED TYPE: ICD-10-CM

## 2025-06-19 DIAGNOSIS — E21.0 PRIMARY HYPERPARATHYROIDISM: ICD-10-CM

## 2025-06-19 DIAGNOSIS — M85.80 OSTEOPENIA, UNSPECIFIED LOCATION: ICD-10-CM

## 2025-06-19 RX ORDER — LEVOTHYROXINE SODIUM 75 UG/1
75 TABLET ORAL DAILY
Qty: 90 TABLET | Refills: 2 | Status: SHIPPED | OUTPATIENT
Start: 2025-06-19

## 2025-06-19 RX ORDER — PRAVASTATIN SODIUM 20 MG
20 TABLET ORAL DAILY
Qty: 90 TABLET | Refills: 2 | OUTPATIENT
Start: 2025-06-19

## 2025-07-21 ENCOUNTER — HOSPITAL ENCOUNTER (OUTPATIENT)
Dept: CT IMAGING | Facility: HOSPITAL | Age: 80
Discharge: HOME | End: 2025-07-21
Attending: OTOLARYNGOLOGY | Admitting: OTOLARYNGOLOGY
Payer: COMMERCIAL

## 2025-07-21 ENCOUNTER — OFFICE VISIT (OUTPATIENT)
Dept: OTOLARYNGOLOGY | Facility: CLINIC | Age: 80
End: 2025-07-21
Payer: COMMERCIAL

## 2025-07-21 DIAGNOSIS — R09.81 CONGESTION OF PARANASAL SINUS: ICD-10-CM

## 2025-07-21 DIAGNOSIS — R93.0 ABNORMAL X-RAY OF PARANASAL SINUS: ICD-10-CM

## 2025-07-21 DIAGNOSIS — R09.81 CONGESTION OF PARANASAL SINUS: Primary | ICD-10-CM

## 2025-07-21 DIAGNOSIS — H61.21 IMPACTED CERUMEN OF RIGHT EAR: ICD-10-CM

## 2025-07-21 PROCEDURE — 70486 CT MAXILLOFACIAL W/O DYE: CPT

## 2025-07-21 NOTE — LETTER
7/21/2025      Kat Diego  [address]  [facility] [place] [address]      Dear Colleague,    Thank you for referring your patient, Kat Diego, to the [facility]. Please see a copy of my visit note below.    CHIEF COMPLAINT: Patient presents with:  Nasal Polyps: Polyp in right sinus per dentis. SNOT 7         HISTORY OF PRESENT ILLNESS    Honey was seen at the behest of Damián Cain MD.     Sino-Nasal Outcome Test (SNOT - 22)    1. Need to Blow Nose: (Proxy-Rptd) (P) Mild or slight  2. Nasal Blockage: (Proxy-Rptd) (P) Mild or slight  3. Sneezing: (Proxy-Rptd) (P) Very mild  4. Runny Nose: (Proxy-Rptd) (P) Very mild  5. Cough: (Proxy-Rptd) (P) None  6. Post-nasal discharge: (Proxy-Rptd) (P) None  7. Thick nasal discharge: (Proxy-Rptd) (P) None  8. Ear fullness: (Proxy-Rptd) (P) None  9. Dizziness: (Proxy-Rptd) (P) None  10. Ear Pain: (Proxy-Rptd) (P) None  11. Facial pain/pressure: (Proxy-Rptd) (P) None  12. Decreased Sense of Smell/Taste: (Proxy-Rptd) (P) None  13. Difficulty falling asleep: (Proxy-Rptd) (P) None  14. Wake up at night: (Proxy-Rptd) (P) Very mild  15. Lack of a good night's sleep: (Proxy-Rptd) (P) None  16. Wake up tired: (Proxy-Rptd) (P) None  17. Fatigue: (Proxy-Rptd) (P) None  18. Reduced Productivity: (Proxy-Rptd) (P) None  19. Reduced Concentration: (Proxy-Rptd) (P) None  20. Frustrated/restless/irritable: (Proxy-Rptd) (P) None  21. Sad: (Proxy-Rptd) (P) None  22. Embarrassed: (Proxy-Rptd) (P) None    Total Score: (Proxy-Rptd) (P) 7    COPYRIGHT 1996. [facility] [place]        REVIEW OF SYSTEMS    Review of Systems as per HPI and PMHx, otherwise 10 system review system are negative.       ALLERGIES    Latex, Seasonal allergies, and Penicillins    CURRENT MEDICATIONS      Current Outpatient Medications:      co-enzyme Q-10 100 MG CAPS capsule, Take 100 mg by mouth (Patient taking differently: Take 100 mg by mouth daily.), Disp: ,  Rfl:      levothyroxine (SYNTHROID/LEVOTHROID) 75 MCG tablet, Take 1 tablet (75 mcg) by mouth daily., Disp: 90 tablet, Rfl: 2     lisinopril (ZESTRIL) 20 MG tablet, Take 1 tablet (20 mg) by mouth daily., Disp: 90 tablet, Rfl: 1     niacin (SLO-NIACIN) 500 MG CR tablet, Take 500 mg by mouth, Disp: , Rfl:      Omega-3 1000 MG capsule, Take 2 g by mouth, Disp: , Rfl:      pravastatin (PRAVACHOL) 20 MG tablet, Take 1 tablet (20 mg) by mouth daily., Disp: 90 tablet, Rfl: 2     vitamin (B COMPLEX) capsule, Take 1 capsule by mouth every other day , Disp: , Rfl:      PAST MEDICAL HISTORY    PAST MEDICAL HISTORY:   Past Medical History:   Diagnosis Date     Allergic rhinitis      Atopic dermatitis      Disease of thyroid gland     hypo     Diverticul disease small and large intestine, no perforati or abscess      Rosacea        PAST SURGICAL HISTORY    PAST SURGICAL HISTORY:   Past Surgical History:   Procedure Laterality Date     DENTAL SURGERY       DILATION AND CURETTAGE       TOTAL KNEE ARTHROPLASTY Left 2015    [facility]; Rolf Helton MD     WISDOM TOOTH EXTRACTION       [facility] TOTAL KNEE ARTHROPLASTY Left 2015    Procedure: KNEE TOTAL ARTHROPLASTY, LEFT;  Surgeon: Rolf Helton MD;  Location: [facility];  Service: Orthopedics     [facility] TOTAL KNEE ARTHROPLASTY Right 2016    Procedure: RIGHT KNEE TOTAL ARTHROPLASTY;  Surgeon: Rolf Helton MD;  Location: [facility];  Service: Orthopedics       FAMILY  HISTORY    FAMILY HISTORY:   Family History   Problem Relation Age of Onset     Heart Disease Mother      Cerebrovascular Disease Mother      Colon Cancer Father      Hyperlipidemia Sister      Hypertension Sister      Heart Disease Brother 60.00        Massive MI,  in sleep       SOCIAL HISTORY    SOCIAL HISTORY:   Social History     Tobacco Use     Smoking status: Never     Smokeless tobacco: Never   Substance Use Topics     Alcohol use: Yes     Comment: Alcoholic  Drinks/day: rarely        PHYSICAL EXAM    HEAD: Normal appearance and symmetry:  No cutaneous lesions.      NECK:  supple     EARS:    Right:   TM intact;  cerumen impaction noted   LEFT:   TM intact     CERUMEN IMPACTION REMOVAL    Findings:  on the right Cerumen Impaction(s)    After obtaining verbal consent, and using the binocular microscope.  Cerumen impaction(s) were removed from the affected ear canal(s)  using a wire loops and/or suction.  The patient tolerated the procedure well without incident.      EYES:  EOMI    CN VII/XII:  intact     NOSE:     Dorsum:   straight  Septum:  midline  Mucosa:  moist        ORAL CAVITY/OROPHARYNX:     Lips:  Normal.  Tongue: normal, midline  Mucosa:   no lesions     NECK:  Trachea:  midline.              Thyroid:  normal              Adenopathy:  none        NEURO:   Alert and Oriented     GAIT AND STATION:  normal     RESPIRATORY:   Symmetry and Respiratory effort     PSYCH:  Normal mood and affect     SKIN:   warm and dry         IMPRESSION:    Encounter Diagnoses   Name Primary?     Nasal polyp      Congestion of paranasal sinus Yes     Abnormal x-ray of paranasal sinus           RECOMMENDATIONS:            Again, thank you for allowing me to participate in the care of your patient.        Sincerely,        Gio Walters MD    Electronically signed

## 2025-07-21 NOTE — NURSING NOTE
Sino-Nasal Outcome Test (SNOT - 22)    1. Need to Blow Nose: (Proxy-Rptd) (P) Mild or slight  2. Nasal Blockage: (Proxy-Rptd) (P) Mild or slight  3. Sneezing: (Proxy-Rptd) (P) Very mild  4. Runny Nose: (Proxy-Rptd) (P) Very mild  5. Cough: (Proxy-Rptd) (P) None  6. Post-nasal discharge: (Proxy-Rptd) (P) None  7. Thick nasal discharge: (Proxy-Rptd) (P) None  8. Ear fullness: (Proxy-Rptd) (P) None  9. Dizziness: (Proxy-Rptd) (P) None  10. Ear Pain: (Proxy-Rptd) (P) None  11. Facial pain/pressure: (Proxy-Rptd) (P) None  12. Decreased Sense of Smell/Taste: (Proxy-Rptd) (P) None  13. Difficulty falling asleep: (Proxy-Rptd) (P) None  14. Wake up at night: (Proxy-Rptd) (P) Very mild  15. Lack of a good night's sleep: (Proxy-Rptd) (P) None  16. Wake up tired: (Proxy-Rptd) (P) None  17. Fatigue: (Proxy-Rptd) (P) None  18. Reduced Productivity: (Proxy-Rptd) (P) None  19. Reduced Concentration: (Proxy-Rptd) (P) None  20. Frustrated/restless/irritable: (Proxy-Rptd) (P) None  21. Sad: (Proxy-Rptd) (P) None  22. Embarrassed: (Proxy-Rptd) (P) None    Total Score: (Proxy-Rptd) (P) 7    COPYRIGHT 1996. [facility] [place]

## 2025-07-21 NOTE — PROGRESS NOTES
CHIEF COMPLAINT: Patient presents with:  Nasal Polyps: Polyp in right sinus per dentis. SNOT 7         HISTORY OF PRESENT ILLNESS    Honey was seen at the behest of Damián Cain MD.     Sino-Nasal Outcome Test (SNOT - 22)    1. Need to Blow Nose: (Proxy-Rptd) (P) Mild or slight  2. Nasal Blockage: (Proxy-Rptd) (P) Mild or slight  3. Sneezing: (Proxy-Rptd) (P) Very mild  4. Runny Nose: (Proxy-Rptd) (P) Very mild  5. Cough: (Proxy-Rptd) (P) None  6. Post-nasal discharge: (Proxy-Rptd) (P) None  7. Thick nasal discharge: (Proxy-Rptd) (P) None  8. Ear fullness: (Proxy-Rptd) (P) None  9. Dizziness: (Proxy-Rptd) (P) None  10. Ear Pain: (Proxy-Rptd) (P) None  11. Facial pain/pressure: (Proxy-Rptd) (P) None  12. Decreased Sense of Smell/Taste: (Proxy-Rptd) (P) None  13. Difficulty falling asleep: (Proxy-Rptd) (P) None  14. Wake up at night: (Proxy-Rptd) (P) Very mild  15. Lack of a good night's sleep: (Proxy-Rptd) (P) None  16. Wake up tired: (Proxy-Rptd) (P) None  17. Fatigue: (Proxy-Rptd) (P) None  18. Reduced Productivity: (Proxy-Rptd) (P) None  19. Reduced Concentration: (Proxy-Rptd) (P) None  20. Frustrated/restless/irritable: (Proxy-Rptd) (P) None  21. Sad: (Proxy-Rptd) (P) None  22. Embarrassed: (Proxy-Rptd) (P) None    Total Score: (Proxy-Rptd) (P) 7    COPYRIGHT 1996. [facility] [place]        REVIEW OF SYSTEMS    Review of Systems as per HPI and PMHx, otherwise 10 system review system are negative.       ALLERGIES    Latex, Seasonal allergies, and Penicillins    CURRENT MEDICATIONS      Current Outpatient Medications:     co-enzyme Q-10 100 MG CAPS capsule, Take 100 mg by mouth (Patient taking differently: Take 100 mg by mouth daily.), Disp: , Rfl:     levothyroxine (SYNTHROID/LEVOTHROID) 75 MCG tablet, Take 1 tablet (75 mcg) by mouth daily., Disp: 90 tablet, Rfl: 2    lisinopril (ZESTRIL) 20 MG tablet, Take 1 tablet (20 mg) by mouth daily., Disp: 90 tablet, Rfl: 1    niacin (SLO-NIACIN)  500 MG CR tablet, Take 500 mg by mouth, Disp: , Rfl:     Omega-3 1000 MG capsule, Take 2 g by mouth, Disp: , Rfl:     pravastatin (PRAVACHOL) 20 MG tablet, Take 1 tablet (20 mg) by mouth daily., Disp: 90 tablet, Rfl: 2    vitamin (B COMPLEX) capsule, Take 1 capsule by mouth every other day , Disp: , Rfl:      PAST MEDICAL HISTORY    PAST MEDICAL HISTORY:   Past Medical History:   Diagnosis Date    Allergic rhinitis     Atopic dermatitis     Disease of thyroid gland     hypo    Diverticul disease small and large intestine, no perforati or abscess     Rosacea        PAST SURGICAL HISTORY    PAST SURGICAL HISTORY:   Past Surgical History:   Procedure Laterality Date    DENTAL SURGERY      DILATION AND CURETTAGE      TOTAL KNEE ARTHROPLASTY Left 2015    [facility]; Rolf Helton MD    WISDOM TOOTH EXTRACTION      [facility] TOTAL KNEE ARTHROPLASTY Left 2015    Procedure: KNEE TOTAL ARTHROPLASTY, LEFT;  Surgeon: Rolf Helton MD;  Location: [facility];  Service: Orthopedics    [facility] TOTAL KNEE ARTHROPLASTY Right 2016    Procedure: RIGHT KNEE TOTAL ARTHROPLASTY;  Surgeon: Rolf Helton MD;  Location: [facility];  Service: Orthopedics       FAMILY  HISTORY    FAMILY HISTORY:   Family History   Problem Relation Age of Onset    Heart Disease Mother     Cerebrovascular Disease Mother     Colon Cancer Father     Hyperlipidemia Sister     Hypertension Sister     Heart Disease Brother 60.00        Massive MI,  in sleep       SOCIAL HISTORY    SOCIAL HISTORY:   Social History     Tobacco Use    Smoking status: Never    Smokeless tobacco: Never   Substance Use Topics    Alcohol use: Yes     Comment: Alcoholic Drinks/day: rarely        PHYSICAL EXAM    HEAD: Normal appearance and symmetry:  No cutaneous lesions.      NECK:  supple     EARS:    Right:   TM intact;  cerumen impaction noted   LEFT:   TM intact     CERUMEN IMPACTION REMOVAL    Findings:  on the right Cerumen  Impaction(s)    After obtaining verbal consent, and using the binocular microscope.  Cerumen impaction(s) were removed from the affected ear canal(s)  using a wire loops and/or suction.  The patient tolerated the procedure well without incident.      EYES:  EOMI    CN VII/XII:  intact     NOSE:     Dorsum:   straight  Septum:  midline  Mucosa:  moist        ORAL CAVITY/OROPHARYNX:     Lips:  Normal.  Tongue: normal, midline  Mucosa:   no lesions     NECK:  Trachea:  midline.              Thyroid:  normal              Adenopathy:  none        NEURO:   Alert and Oriented     GAIT AND STATION:  normal     RESPIRATORY:   Symmetry and Respiratory effort     PSYCH:  Normal mood and affect     SKIN:   warm and dry         IMPRESSION:    Encounter Diagnoses   Name Primary?    Nasal polyp     Congestion of paranasal sinus Yes    Abnormal x-ray of paranasal sinus           RECOMMENDATIONS:

## 2025-08-01 ENCOUNTER — MYC MEDICAL ADVICE (OUTPATIENT)
Dept: ENDOCRINOLOGY | Facility: CLINIC | Age: 80
End: 2025-08-01
Payer: COMMERCIAL

## 2025-08-01 DIAGNOSIS — M85.80 OSTEOPENIA, UNSPECIFIED LOCATION: ICD-10-CM

## 2025-08-01 DIAGNOSIS — E83.52 HYPERCALCEMIA: Primary | ICD-10-CM

## 2025-08-04 RX ORDER — ALENDRONATE SODIUM 70 MG/1
70 TABLET ORAL
Qty: 4 TABLET | Refills: 11 | Status: SHIPPED | OUTPATIENT
Start: 2025-08-04

## 2025-08-26 ENCOUNTER — PATIENT OUTREACH (OUTPATIENT)
Dept: FAMILY MEDICINE | Facility: CLINIC | Age: 80
End: 2025-08-26
Payer: COMMERCIAL

## 2025-09-04 ENCOUNTER — ALLIED HEALTH/NURSE VISIT (OUTPATIENT)
Dept: FAMILY MEDICINE | Facility: CLINIC | Age: 80
End: 2025-09-04
Payer: COMMERCIAL

## 2025-09-04 VITALS — DIASTOLIC BLOOD PRESSURE: 67 MMHG | SYSTOLIC BLOOD PRESSURE: 139 MMHG | HEART RATE: 74 BPM

## 2025-09-04 DIAGNOSIS — Z01.30 BLOOD PRESSURE CHECK: Primary | ICD-10-CM
